# Patient Record
Sex: FEMALE | Race: WHITE | Employment: OTHER | URBAN - METROPOLITAN AREA
[De-identification: names, ages, dates, MRNs, and addresses within clinical notes are randomized per-mention and may not be internally consistent; named-entity substitution may affect disease eponyms.]

---

## 2017-01-18 ENCOUNTER — OFFICE VISIT (OUTPATIENT)
Dept: INTERNAL MEDICINE CLINIC | Facility: CLINIC | Age: 67
End: 2017-01-18

## 2017-01-18 VITALS
HEART RATE: 72 BPM | DIASTOLIC BLOOD PRESSURE: 68 MMHG | BODY MASS INDEX: 28 KG/M2 | RESPIRATION RATE: 16 BRPM | WEIGHT: 143.63 LBS | SYSTOLIC BLOOD PRESSURE: 104 MMHG | TEMPERATURE: 98 F

## 2017-01-18 DIAGNOSIS — S83.91XA SPRAIN OF RIGHT KNEE, UNSPECIFIED LIGAMENT, INITIAL ENCOUNTER: Primary | ICD-10-CM

## 2017-01-18 PROCEDURE — 99213 OFFICE O/P EST LOW 20 MIN: CPT | Performed by: FAMILY MEDICINE

## 2017-01-19 NOTE — PATIENT INSTRUCTIONS
Knee Sprain    A sprain is an injury to the ligaments or capsule that holds a joint together. There are no broken bones. Most sprains take 3 to 6 weeks to heal. If it a severe sprain where the ligament is completely torn, it can take months to recover. · You may use over-the-counter pain medicine to control pain, unless another pain medicine was prescribed. If you have chronic liver or kidney disease or ever had a stomach ulcer or GI bleeding, talk with your healthcare provider before using these medicine

## 2017-01-19 NOTE — PROGRESS NOTES
HPI:    Patient ID: Tamara Woodward is a 73year old female. HPI Here with one day right knee pain. Patient reports no specific injury just started. No pain if not walking. If walking, pain is lateral knee. No numbness/tingling/weakness.  Feels that it gives ou

## 2017-05-24 ENCOUNTER — TELEPHONE (OUTPATIENT)
Dept: INTERNAL MEDICINE CLINIC | Facility: CLINIC | Age: 67
End: 2017-05-24

## 2017-05-24 DIAGNOSIS — Z01.818 PREOPERATIVE EXAMINATION: ICD-10-CM

## 2017-05-24 DIAGNOSIS — M20.41 ACQUIRED HAMMER TOE OF RIGHT FOOT: Primary | ICD-10-CM

## 2017-05-24 DIAGNOSIS — M24.574 CONTRACTURE OF JOINT OF RIGHT FOOT: ICD-10-CM

## 2017-05-24 NOTE — TELEPHONE ENCOUNTER
Please call patient. She needs to have a CMP and EKG done and schedule an appt for pre-op with me a few days after she has them done. This needs to be done soon. Surgery is scheduled for 6/16/17.  She has [facility] listed for her lab so I placed the order for t

## 2017-05-27 ENCOUNTER — HOSPITAL ENCOUNTER (OUTPATIENT)
Dept: CV DIAGNOSTICS | Facility: HOSPITAL | Age: 67
Discharge: HOME | End: 2017-05-27
Attending: FAMILY MEDICINE
Payer: COMMERCIAL

## 2017-05-27 DIAGNOSIS — M20.41 ACQUIRED HAMMER TOE OF RIGHT FOOT: ICD-10-CM

## 2017-05-27 DIAGNOSIS — M24.574 CONTRACTURE OF JOINT OF RIGHT FOOT: ICD-10-CM

## 2017-05-27 DIAGNOSIS — Z01.818 PREOPERATIVE EXAMINATION: ICD-10-CM

## 2017-05-27 PROCEDURE — 93010 ELECTROCARDIOGRAM REPORT: CPT | Performed by: INTERNAL MEDICINE

## 2017-05-27 PROCEDURE — 93005 ELECTROCARDIOGRAM TRACING: CPT

## 2017-06-06 ENCOUNTER — TELEPHONE (OUTPATIENT)
Dept: INTERNAL MEDICINE CLINIC | Facility: CLINIC | Age: 67
End: 2017-06-06

## 2017-06-06 ENCOUNTER — OFFICE VISIT (OUTPATIENT)
Dept: INTERNAL MEDICINE CLINIC | Facility: CLINIC | Age: 67
End: 2017-06-06

## 2017-06-06 VITALS
HEART RATE: 65 BPM | TEMPERATURE: 98 F | HEIGHT: 60 IN | DIASTOLIC BLOOD PRESSURE: 76 MMHG | WEIGHT: 139 LBS | OXYGEN SATURATION: 97 % | RESPIRATION RATE: 17 BRPM | BODY MASS INDEX: 27.29 KG/M2 | SYSTOLIC BLOOD PRESSURE: 122 MMHG

## 2017-06-06 DIAGNOSIS — Z01.818 PREOPERATIVE EXAMINATION: Primary | ICD-10-CM

## 2017-06-06 DIAGNOSIS — M20.41 ACQUIRED HAMMER TOE OF RIGHT FOOT: ICD-10-CM

## 2017-06-06 DIAGNOSIS — M24.574 CONTRACTURE OF JOINT OF RIGHT FOOT: ICD-10-CM

## 2017-06-06 PROCEDURE — 99213 OFFICE O/P EST LOW 20 MIN: CPT | Performed by: FAMILY MEDICINE

## 2017-06-08 ENCOUNTER — TELEPHONE (OUTPATIENT)
Dept: INTERNAL MEDICINE CLINIC | Facility: CLINIC | Age: 67
End: 2017-06-08

## 2017-06-08 NOTE — TELEPHONE ENCOUNTER
vmml for pt, advising AMS out of office today. Advised usually sore throats are viral, can try comfort measures ie salt water gargles, warm liquids to soothe throat, and otc ie tylenol or whatever she takes for a headache.   Advised we will call her back i

## 2017-06-12 ENCOUNTER — TELEPHONE (OUTPATIENT)
Dept: INTERNAL MEDICINE CLINIC | Facility: CLINIC | Age: 67
End: 2017-06-12

## 2017-06-12 ENCOUNTER — PATIENT MESSAGE (OUTPATIENT)
Dept: INTERNAL MEDICINE CLINIC | Facility: CLINIC | Age: 67
End: 2017-06-12

## 2017-06-13 NOTE — TELEPHONE ENCOUNTER
Patient states the DMV is closed on Mondays, was going to go back to work today but already called in to work for a sick day and changed her clothes. Pt would like the note to say she can return to work tomorrow 6/14/17. Please advise.

## 2017-06-13 NOTE — TELEPHONE ENCOUNTER
Please call or message patient. I sent her note with days off that she listed in her note to us. I sent to her MyChart. Please have patient review to make sure it is what she was looking for.

## 2017-06-13 NOTE — TELEPHONE ENCOUNTER
ADRY for patient at [phone] ESTUARDO will be writing a note for her. Asked pt to call back to let us know where the note needs to be faxed.

## 2017-06-13 NOTE — TELEPHONE ENCOUNTER
From: Olamide Irwin  To:  Kathy Houston DO  Sent: 6/12/2017 6:27 PM CDT  Subject: Other    Today I requested a Return To Work note so I can   return to work tomorrow (Tuesday)  I was home on Thursday, Friday and Saturday with flue-like or Sinus   Infection-li

## 2018-01-17 ENCOUNTER — TELEPHONE (OUTPATIENT)
Dept: INTERNAL MEDICINE CLINIC | Facility: CLINIC | Age: 68
End: 2018-01-17

## 2018-01-17 NOTE — TELEPHONE ENCOUNTER
Called and left detailed message on pt's cell phone. I reminded pt to call the office and schedule her annual px. Pt's last physical- 10/03/16   No future appointments.

## 2018-01-30 ENCOUNTER — TELEPHONE (OUTPATIENT)
Dept: INTERNAL MEDICINE CLINIC | Facility: CLINIC | Age: 68
End: 2018-01-30

## 2018-01-30 DIAGNOSIS — Z13.1 SCREENING FOR DIABETES MELLITUS: ICD-10-CM

## 2018-01-30 DIAGNOSIS — Z13.220 SCREENING, LIPID: Primary | ICD-10-CM

## 2018-01-30 DIAGNOSIS — Z13.0 SCREENING FOR DEFICIENCY ANEMIA: ICD-10-CM

## 2018-01-30 DIAGNOSIS — Z13.29 SCREENING FOR THYROID DISORDER: ICD-10-CM

## 2018-01-30 NOTE — TELEPHONE ENCOUNTER
Pt states last week she had stomach flu now yesterday and today after she eats she has liquid diarrhea and her stomach grumbles-is this a flu bug and what can she do for it?  Call to advise

## 2018-01-31 NOTE — TELEPHONE ENCOUNTER
Patient states she did have an episode yesterday of liquid diarrhea x 6 and today is a little better.   Pt denies fever, pain, n/v.  Pt notified to try the BRAT diet, push fluids, no spicy foods, no dairy, no raw fruits or vegetables and call if s/s do not

## 2018-02-01 NOTE — TELEPHONE ENCOUNTER
Pt called back today to say she is a little better and wants to know when she should start eating sold foods?  Call to advise

## 2018-02-01 NOTE — TELEPHONE ENCOUNTER
Pt stating is feeling better. Still loose stools. Slight intermittent abdominal discomfort. No bloating or swelling. Has been following bland/BRAT diet x2 days. No fever at this time. No N/V.  Advised may try diet regimen for another couple days if would li

## 2018-02-10 ENCOUNTER — APPOINTMENT (OUTPATIENT)
Dept: LAB | Facility: HOSPITAL | Age: 68
End: 2018-02-10
Attending: FAMILY MEDICINE
Payer: COMMERCIAL

## 2018-02-10 LAB
ALBUMIN SERPL-MCNC: 3.5 G/DL (ref 3.5–4.8)
ALP LIVER SERPL-CCNC: 71 U/L (ref 55–142)
ALT SERPL-CCNC: 7 U/L (ref 14–54)
AST SERPL-CCNC: 18 U/L (ref 15–41)
BASOPHILS # BLD AUTO: 0.07 X10(3) UL (ref 0–0.1)
BASOPHILS NFR BLD AUTO: 1.2 %
BILIRUB SERPL-MCNC: 0.7 MG/DL (ref 0.1–2)
BUN BLD-MCNC: 16 MG/DL (ref 8–20)
CALCIUM BLD-MCNC: 8.9 MG/DL (ref 8.3–10.3)
CHLORIDE: 109 MMOL/L (ref 101–111)
CHOLEST SMN-MCNC: 141 MG/DL (ref ?–200)
CO2: 29 MMOL/L (ref 22–32)
CREAT BLD-MCNC: 0.71 MG/DL (ref 0.55–1.02)
EOSINOPHIL # BLD AUTO: 0.23 X10(3) UL (ref 0–0.3)
EOSINOPHIL NFR BLD AUTO: 4 %
ERYTHROCYTE [DISTWIDTH] IN BLOOD BY AUTOMATED COUNT: 12.5 % (ref 11.5–16)
GLUCOSE BLD-MCNC: 84 MG/DL (ref 70–99)
HCT VFR BLD AUTO: 37.2 % (ref 34–50)
HDLC SERPL-MCNC: 47 MG/DL (ref 45–?)
HDLC SERPL: 3 {RATIO} (ref ?–4.44)
HGB BLD-MCNC: 12.1 G/DL (ref 12–16)
IMMATURE GRANULOCYTE COUNT: 0.01 X10(3) UL (ref 0–1)
IMMATURE GRANULOCYTE RATIO %: 0.2 %
LDLC SERPL CALC-MCNC: 85 MG/DL (ref ?–130)
LYMPHOCYTES # BLD AUTO: 1.77 X10(3) UL (ref 0.9–4)
LYMPHOCYTES NFR BLD AUTO: 30.7 %
M PROTEIN MFR SERPL ELPH: 6.5 G/DL (ref 6.1–8.3)
MCH RBC QN AUTO: 29.4 PG (ref 27–33.2)
MCHC RBC AUTO-ENTMCNC: 32.5 G/DL (ref 31–37)
MCV RBC AUTO: 90.3 FL (ref 81–100)
MONOCYTES # BLD AUTO: 0.48 X10(3) UL (ref 0.1–1)
MONOCYTES NFR BLD AUTO: 8.3 %
NEUTROPHIL ABS PRELIM: 3.21 X10 (3) UL (ref 1.3–6.7)
NEUTROPHILS # BLD AUTO: 3.21 X10(3) UL (ref 1.3–6.7)
NEUTROPHILS NFR BLD AUTO: 55.6 %
NONHDLC SERPL-MCNC: 94 MG/DL (ref ?–130)
PLATELET # BLD AUTO: 177 10(3)UL (ref 150–450)
POTASSIUM SERPL-SCNC: 4 MMOL/L (ref 3.6–5.1)
RBC # BLD AUTO: 4.12 X10(6)UL (ref 3.8–5.1)
RED CELL DISTRIBUTION WIDTH-SD: 41.1 FL (ref 35.1–46.3)
SODIUM SERPL-SCNC: 143 MMOL/L (ref 136–144)
TRIGL SERPL-MCNC: 46 MG/DL (ref ?–150)
TSI SER-ACNC: 1.14 MIU/ML (ref 0.35–5.5)
VLDLC SERPL CALC-MCNC: 9 MG/DL (ref 5–40)
WBC # BLD AUTO: 5.8 X10(3) UL (ref 4–13)

## 2018-02-10 PROCEDURE — 85025 COMPLETE CBC W/AUTO DIFF WBC: CPT | Performed by: FAMILY MEDICINE

## 2018-02-10 PROCEDURE — 80053 COMPREHEN METABOLIC PANEL: CPT | Performed by: FAMILY MEDICINE

## 2018-02-10 PROCEDURE — 84443 ASSAY THYROID STIM HORMONE: CPT | Performed by: FAMILY MEDICINE

## 2018-02-10 PROCEDURE — 36415 COLL VENOUS BLD VENIPUNCTURE: CPT | Performed by: FAMILY MEDICINE

## 2018-02-10 PROCEDURE — 80061 LIPID PANEL: CPT | Performed by: FAMILY MEDICINE

## 2018-02-19 ENCOUNTER — OFFICE VISIT (OUTPATIENT)
Dept: INTERNAL MEDICINE CLINIC | Facility: CLINIC | Age: 68
End: 2018-02-19

## 2018-02-19 VITALS
TEMPERATURE: 98 F | HEIGHT: 61 IN | HEART RATE: 62 BPM | RESPIRATION RATE: 16 BRPM | SYSTOLIC BLOOD PRESSURE: 118 MMHG | WEIGHT: 138 LBS | DIASTOLIC BLOOD PRESSURE: 62 MMHG | BODY MASS INDEX: 26.06 KG/M2

## 2018-02-19 DIAGNOSIS — Z78.0 POSTMENOPAUSAL: ICD-10-CM

## 2018-02-19 DIAGNOSIS — Z00.00 ANNUAL PHYSICAL EXAM: Primary | ICD-10-CM

## 2018-02-19 PROCEDURE — 90471 IMMUNIZATION ADMIN: CPT | Performed by: FAMILY MEDICINE

## 2018-02-19 PROCEDURE — 90732 PPSV23 VACC 2 YRS+ SUBQ/IM: CPT | Performed by: FAMILY MEDICINE

## 2018-02-19 PROCEDURE — 90653 IIV ADJUVANT VACCINE IM: CPT | Performed by: FAMILY MEDICINE

## 2018-02-19 PROCEDURE — 90472 IMMUNIZATION ADMIN EACH ADD: CPT | Performed by: FAMILY MEDICINE

## 2018-02-19 PROCEDURE — 99397 PER PM REEVAL EST PAT 65+ YR: CPT | Performed by: FAMILY MEDICINE

## 2018-02-19 NOTE — PROGRESS NOTES
HPI:    Patient ID: Sakina Dove is a 80year old female. HPI Here for annual check-up. Patient has no complaints. Tries to eat healthy but doesn't exercise regularly. Up to date on mammogram and pap- has them done through [facility]. Due for DEXA.      Past Med polydipsia, polyphagia and polyuria. Genitourinary: Negative for dysuria and frequency. Musculoskeletal: Negative for arthralgias and joint swelling. Skin: Negative for rash. Neurological: Negative for dizziness, weakness, numbness and headaches. (CPT=77080)       [id number]

## 2018-02-26 ENCOUNTER — HOSPITAL ENCOUNTER (OUTPATIENT)
Dept: BONE DENSITY | Age: 68
Discharge: HOME | End: 2018-02-26
Attending: FAMILY MEDICINE
Payer: COMMERCIAL

## 2018-02-26 DIAGNOSIS — Z78.0 POSTMENOPAUSAL: ICD-10-CM

## 2018-02-26 PROCEDURE — 77080 DXA BONE DENSITY AXIAL: CPT | Performed by: FAMILY MEDICINE

## 2018-02-28 ENCOUNTER — TELEPHONE (OUTPATIENT)
Dept: INTERNAL MEDICINE CLINIC | Facility: CLINIC | Age: 68
End: 2018-02-28

## 2018-02-28 NOTE — TELEPHONE ENCOUNTER
Patient is calling for test results on her bone density.   Please call her after 5  Patient cannot take any calls until after 5

## 2018-03-09 PROBLEM — M17.11 PRIMARY LOCALIZED OSTEOARTHROSIS OF RIGHT LOWER LEG: Status: ACTIVE | Noted: 2018-03-09

## 2018-03-09 PROBLEM — M25.561 RIGHT KNEE PAIN, UNSPECIFIED CHRONICITY: Status: ACTIVE | Noted: 2018-03-09

## 2018-03-10 ENCOUNTER — HOSPITAL ENCOUNTER (EMERGENCY)
Facility: HOSPITAL | Age: 68
Discharge: HOME | End: 2018-03-10
Attending: EMERGENCY MEDICINE
Payer: COMMERCIAL

## 2018-03-10 VITALS
DIASTOLIC BLOOD PRESSURE: 91 MMHG | OXYGEN SATURATION: 97 % | WEIGHT: 135 LBS | HEIGHT: 60 IN | BODY MASS INDEX: 26.5 KG/M2 | HEART RATE: 75 BPM | TEMPERATURE: 98 F | SYSTOLIC BLOOD PRESSURE: 190 MMHG | RESPIRATION RATE: 16 BRPM

## 2018-03-10 DIAGNOSIS — S39.012A STRAIN OF LUMBAR REGION, INITIAL ENCOUNTER: Primary | ICD-10-CM

## 2018-03-10 PROCEDURE — 99283 EMERGENCY DEPT VISIT LOW MDM: CPT

## 2018-03-10 RX ORDER — CYCLOBENZAPRINE HCL 10 MG
10 TABLET ORAL 3 TIMES DAILY PRN
Status: DISCONTINUED | OUTPATIENT
Start: 2018-03-10 | End: 2018-03-10

## 2018-03-10 RX ORDER — ONDANSETRON 4 MG/1
4 TABLET, ORALLY DISINTEGRATING ORAL EVERY 4 HOURS PRN
Qty: 10 TABLET | Refills: 0 | Status: SHIPPED | OUTPATIENT
Start: 2018-03-10 | End: 2018-03-13

## 2018-03-10 RX ORDER — CYCLOBENZAPRINE HCL 10 MG
10 TABLET ORAL 3 TIMES DAILY PRN
Qty: 20 TABLET | Refills: 0 | Status: SHIPPED | OUTPATIENT
Start: 2018-03-10 | End: 2018-03-17

## 2018-03-11 NOTE — ED PROVIDER NOTES
Patient Seen in: [facility] Emergency Department    History   Patient presents with:  Back Pain (musculoskeletal)    Stated Complaint: Back pain after moving furniture [address]  Denies N/T or incontinence    HPI    72-year-old female presents to the gwen Physical Exam    General appearance: This is a female lying in a gurney. Vital signs were reviewed per nurse's notes. HEENT: Normocephalic atraumatic. Anicteric sclera.   Oral mucosa is moist.  Oropharynx is normal.  Neck: No adenopathy or thyrom

## 2018-03-13 ENCOUNTER — OFFICE VISIT (OUTPATIENT)
Dept: INTERNAL MEDICINE CLINIC | Facility: CLINIC | Age: 68
End: 2018-03-13

## 2018-03-13 VITALS
DIASTOLIC BLOOD PRESSURE: 88 MMHG | HEART RATE: 70 BPM | TEMPERATURE: 98 F | RESPIRATION RATE: 16 BRPM | SYSTOLIC BLOOD PRESSURE: 136 MMHG | BODY MASS INDEX: 27 KG/M2 | WEIGHT: 136 LBS

## 2018-03-13 DIAGNOSIS — M54.42 ACUTE LEFT-SIDED LOW BACK PAIN WITH LEFT-SIDED SCIATICA: Primary | ICD-10-CM

## 2018-03-13 PROCEDURE — 99213 OFFICE O/P EST LOW 20 MIN: CPT | Performed by: FAMILY MEDICINE

## 2018-03-13 RX ORDER — PREDNISONE 20 MG/1
TABLET ORAL
Qty: 14 TABLET | Refills: 0 | Status: SHIPPED | OUTPATIENT
Start: 2018-03-13 | End: 2019-07-29 | Stop reason: ALTCHOICE

## 2018-03-14 NOTE — PROGRESS NOTES
HPI:    Patient ID: Sohail Robertson is a 80year old female. HPI Here for f/u from ER on 3/10 for low back pain. Patient had moved furniture on 3/8 and began to feel pain the next day. Pain is in left low back and left buttock and radiates up and down.  No numb Reviewed ER notes. Continue muscle relaxer. Start oral steroid course. Discussed risks/benefits/potential side effects and proper use of medication. Try heating pad and stretches. Return in 2 weeks if still with similar pain or sooner if worsens.      No or

## 2019-07-29 ENCOUNTER — OFFICE VISIT (OUTPATIENT)
Dept: INTERNAL MEDICINE CLINIC | Facility: CLINIC | Age: 69
End: 2019-07-29
Payer: MEDICARE

## 2019-07-29 VITALS
HEART RATE: 68 BPM | DIASTOLIC BLOOD PRESSURE: 86 MMHG | SYSTOLIC BLOOD PRESSURE: 120 MMHG | OXYGEN SATURATION: 98 % | TEMPERATURE: 99 F | WEIGHT: 140.19 LBS | HEIGHT: 59.96 IN | RESPIRATION RATE: 17 BRPM | BODY MASS INDEX: 27.52 KG/M2

## 2019-07-29 DIAGNOSIS — Z13.1 SCREENING FOR DIABETES MELLITUS: ICD-10-CM

## 2019-07-29 DIAGNOSIS — Z13.220 SCREENING, LIPID: ICD-10-CM

## 2019-07-29 DIAGNOSIS — Z13.0 SCREENING FOR DEFICIENCY ANEMIA: ICD-10-CM

## 2019-07-29 DIAGNOSIS — Z00.00 ENCOUNTER FOR ANNUAL HEALTH EXAMINATION: Primary | ICD-10-CM

## 2019-07-29 DIAGNOSIS — Z11.59 NEED FOR HEPATITIS C SCREENING TEST: ICD-10-CM

## 2019-07-29 DIAGNOSIS — Z01.84 IMMUNITY STATUS TESTING: ICD-10-CM

## 2019-07-29 PROCEDURE — G0402 INITIAL PREVENTIVE EXAM: HCPCS | Performed by: FAMILY MEDICINE

## 2019-07-29 PROCEDURE — 90714 TD VACC NO PRESV 7 YRS+ IM: CPT | Performed by: FAMILY MEDICINE

## 2019-07-29 PROCEDURE — 90471 IMMUNIZATION ADMIN: CPT | Performed by: FAMILY MEDICINE

## 2019-07-29 NOTE — PATIENT INSTRUCTIONS
Yelena Taylor SCREENING SCHEDULE   Tests on this list are recommended by your physician but may not be covered, or covered at this frequency, by your insurer. Please check with your insurance carrier before scheduling to verify coverage.    PREVENTATIVE SERV this or any previous visit.  Limited to patients who meet one of the following criteria:   • Men who are 73-68 years old and have smoked more than 100 cigarettes in their lifetime   • Anyone with a family history    Colorectal Cancer Screening  Covered up t regularly   Immunizations      Influenza  Covered Annually Orders placed or performed in visit on 10/03/16   • FLU VACC [address] ANTIG   Orders placed or performed in visit on 10/21/13   • INFLUENZA VIRUS VACCINE, >=1YEARS OF AGE    Please get every their home computer and printer. (the forms are also available in [address])  [email or web address]  This link also has information from the [facility] regarding Advance Directives.

## 2019-07-29 NOTE — PROGRESS NOTES
MAMMOGRAM DIGITAL BILATERAL DIAGNOSTIC (MAMDDXBL)4/8/2019  [facility]  Component Name Value Ref Range   GDT INDICATION: 71-year-old female presents for follow-up evaluation of   probably benign calcifications within the right breast. are redemonstrated without suspicious  interval change   dating back to  ** Text truncated; see Epic**       HPI:   Donna Perdomo is a 77year old female who presents for a Medicare Initial Annual Wellness visit (Once after 12 month Medicare anniversary) .     [facility], and patient is instructed to get our office a copy of it for scanning into Epic. She has never smoked tobacco.    CAGE Alcohol screening   Lynda Guerrero was screened for Alcohol abuse and had a score of 0 so is at low risk.     Patient Care Team: MEGAN pain or ST  LUNGS: denies shortness of breath with exertion  CARDIOVASCULAR: denies chest pain on exertion  GI: denies abdominal pain, denies heartburn  : denies dysuria, vaginal discharge or itching, no complaint of urinary incontinence   MUSCULOSKELETA texture, turgor normal, no rashes or lesions   Lymph nodes: Cervical, supraclavicular, and axillary nodes normal   Neurologic: Normal       Vaccination History     Immunization History   Administered Date(s) Administered   • FLU VACC High Dose 65 YRS & Old Colonoscopy Screen every 10 years Colonoscopy due on 06/29/2025 Update Health Maintenance if applicable    Flex Sigmoidoscopy Screen every 10 years No results found for this or any previous visit. No flowsheet data found.      Fecal Occult Blood Annuall Zoster  Not covered by Medicare Part B No vaccine history found This may be covered with your pharmacy  prescription benefits                    Template: [address] [[id number]]

## 2019-07-30 ENCOUNTER — LAB ENCOUNTER (OUTPATIENT)
Dept: LAB | Age: 69
End: 2019-07-30
Attending: FAMILY MEDICINE
Payer: MEDICARE

## 2019-07-30 DIAGNOSIS — Z13.0 SCREENING FOR DEFICIENCY ANEMIA: ICD-10-CM

## 2019-07-30 DIAGNOSIS — Z13.1 SCREENING FOR DIABETES MELLITUS: ICD-10-CM

## 2019-07-30 DIAGNOSIS — Z11.59 NEED FOR HEPATITIS C SCREENING TEST: ICD-10-CM

## 2019-07-30 DIAGNOSIS — Z00.00 ENCOUNTER FOR ANNUAL HEALTH EXAMINATION: ICD-10-CM

## 2019-07-30 DIAGNOSIS — Z13.220 SCREENING, LIPID: ICD-10-CM

## 2019-07-30 DIAGNOSIS — Z01.84 IMMUNITY STATUS TESTING: ICD-10-CM

## 2019-07-30 LAB
ALBUMIN SERPL-MCNC: 3.8 G/DL (ref 3.4–5)
ALBUMIN/GLOB SERPL: 1.1 {RATIO} (ref 1–2)
ALP LIVER SERPL-CCNC: 81 U/L (ref 55–142)
ALT SERPL-CCNC: 12 U/L (ref 13–56)
ANION GAP SERPL CALC-SCNC: 5 MMOL/L (ref 0–18)
AST SERPL-CCNC: 19 U/L (ref 15–37)
BASOPHILS # BLD AUTO: 0.07 X10(3) UL (ref 0–0.2)
BASOPHILS NFR BLD AUTO: 1.3 %
BILIRUB SERPL-MCNC: 0.7 MG/DL (ref 0.1–2)
BUN BLD-MCNC: 13 MG/DL (ref 7–18)
BUN/CREAT SERPL: 14.9 (ref 10–20)
CALCIUM BLD-MCNC: 8.8 MG/DL (ref 8.5–10.1)
CHLORIDE SERPL-SCNC: 107 MMOL/L (ref 98–112)
CHOLEST SMN-MCNC: 202 MG/DL (ref ?–200)
CO2 SERPL-SCNC: 29 MMOL/L (ref 21–32)
CREAT BLD-MCNC: 0.87 MG/DL (ref 0.55–1.02)
DEPRECATED RDW RBC AUTO: 41.6 FL (ref 35.1–46.3)
EOSINOPHIL # BLD AUTO: 0.26 X10(3) UL (ref 0–0.7)
EOSINOPHIL NFR BLD AUTO: 4.7 %
ERYTHROCYTE [DISTWIDTH] IN BLOOD BY AUTOMATED COUNT: 12.5 % (ref 11–15)
GLOBULIN PLAS-MCNC: 3.4 G/DL (ref 2.8–4.4)
GLUCOSE BLD-MCNC: 86 MG/DL (ref 70–99)
HCT VFR BLD AUTO: 40.1 % (ref 35–48)
HCV AB SERPL QL IA: NONREACTIVE
HDLC SERPL-MCNC: 55 MG/DL (ref 40–59)
HGB BLD-MCNC: 13.1 G/DL (ref 12–16)
IMM GRANULOCYTES # BLD AUTO: 0.02 X10(3) UL (ref 0–1)
IMM GRANULOCYTES NFR BLD: 0.4 %
LDLC SERPL CALC-MCNC: 135 MG/DL (ref ?–100)
LYMPHOCYTES # BLD AUTO: 1.45 X10(3) UL (ref 1–4)
LYMPHOCYTES NFR BLD AUTO: 26.2 %
M PROTEIN MFR SERPL ELPH: 7.2 G/DL (ref 6.4–8.2)
MCH RBC QN AUTO: 29.7 PG (ref 26–34)
MCHC RBC AUTO-ENTMCNC: 32.7 G/DL (ref 31–37)
MCV RBC AUTO: 90.9 FL (ref 80–100)
MONOCYTES # BLD AUTO: 0.4 X10(3) UL (ref 0.1–1)
MONOCYTES NFR BLD AUTO: 7.2 %
NEUTROPHILS # BLD AUTO: 3.33 X10 (3) UL (ref 1.5–7.7)
NEUTROPHILS # BLD AUTO: 3.33 X10(3) UL (ref 1.5–7.7)
NEUTROPHILS NFR BLD AUTO: 60.2 %
NONHDLC SERPL-MCNC: 147 MG/DL (ref ?–130)
OSMOLALITY SERPL CALC.SUM OF ELEC: 291 MOSM/KG (ref 275–295)
PLATELET # BLD AUTO: 196 10(3)UL (ref 150–450)
POTASSIUM SERPL-SCNC: 4.3 MMOL/L (ref 3.5–5.1)
RBC # BLD AUTO: 4.41 X10(6)UL (ref 3.8–5.3)
SODIUM SERPL-SCNC: 141 MMOL/L (ref 136–145)
TRIGL SERPL-MCNC: 58 MG/DL (ref 30–149)
VLDLC SERPL CALC-MCNC: 12 MG/DL (ref 0–30)
VZV IGG SER IA-ACNC: 1327 (ref 165–?)
WBC # BLD AUTO: 5.5 X10(3) UL (ref 4–11)

## 2019-07-30 PROCEDURE — 86803 HEPATITIS C AB TEST: CPT

## 2019-07-30 PROCEDURE — 80061 LIPID PANEL: CPT

## 2019-07-30 PROCEDURE — 80053 COMPREHEN METABOLIC PANEL: CPT

## 2019-07-30 PROCEDURE — 86787 VARICELLA-ZOSTER ANTIBODY: CPT

## 2019-07-30 PROCEDURE — 36415 COLL VENOUS BLD VENIPUNCTURE: CPT

## 2019-07-30 PROCEDURE — 85025 COMPLETE CBC W/AUTO DIFF WBC: CPT

## 2019-08-05 ENCOUNTER — MED REC SCAN ONLY (OUTPATIENT)
Dept: INTERNAL MEDICINE CLINIC | Facility: CLINIC | Age: 69
End: 2019-08-05

## 2019-10-11 ENCOUNTER — TELEPHONE (OUTPATIENT)
Dept: INTERNAL MEDICINE CLINIC | Facility: CLINIC | Age: 69
End: 2019-10-11

## 2019-10-11 NOTE — TELEPHONE ENCOUNTER
Fax received from patient  Fluzone and shingrix shot given at  [place], [address]  [facility] [address]  EMR updated

## 2019-12-30 ENCOUNTER — APPOINTMENT (OUTPATIENT)
Dept: ULTRASOUND IMAGING | Facility: HOSPITAL | Age: 69
End: 2019-12-30
Attending: EMERGENCY MEDICINE
Payer: MEDICARE

## 2019-12-30 ENCOUNTER — TELEPHONE (OUTPATIENT)
Dept: INTERNAL MEDICINE CLINIC | Facility: CLINIC | Age: 69
End: 2019-12-30

## 2019-12-30 ENCOUNTER — TELEPHONE (OUTPATIENT)
Dept: SURGERY | Facility: CLINIC | Age: 69
End: 2019-12-30

## 2019-12-30 ENCOUNTER — HOSPITAL ENCOUNTER (OUTPATIENT)
Age: 69
Discharge: HOME | End: 2019-12-30
Attending: EMERGENCY MEDICINE
Payer: MEDICARE

## 2019-12-30 VITALS
HEIGHT: 60 IN | OXYGEN SATURATION: 97 % | BODY MASS INDEX: 27.09 KG/M2 | DIASTOLIC BLOOD PRESSURE: 87 MMHG | SYSTOLIC BLOOD PRESSURE: 150 MMHG | TEMPERATURE: 98 F | WEIGHT: 138 LBS | RESPIRATION RATE: 18 BRPM | HEART RATE: 70 BPM

## 2019-12-30 DIAGNOSIS — K80.50 BILIARY COLIC: Primary | ICD-10-CM

## 2019-12-30 LAB
ALBUMIN SERPL-MCNC: 3.6 G/DL (ref 3.4–5)
ALP LIVER SERPL-CCNC: 75 U/L (ref 55–142)
ALT SERPL-CCNC: 9 U/L (ref 13–56)
AST SERPL-CCNC: 17 U/L (ref 15–37)
BILIRUB DIRECT SERPL-MCNC: 0.2 MG/DL (ref 0–0.2)
BILIRUB SERPL-MCNC: 0.8 MG/DL (ref 0.1–2)
LIPASE SERPL-CCNC: 72 U/L (ref 73–393)
M PROTEIN MFR SERPL ELPH: 6.9 G/DL (ref 6.4–8.2)

## 2019-12-30 PROCEDURE — 36415 COLL VENOUS BLD VENIPUNCTURE: CPT

## 2019-12-30 PROCEDURE — 80076 HEPATIC FUNCTION PANEL: CPT | Performed by: EMERGENCY MEDICINE

## 2019-12-30 PROCEDURE — 99214 OFFICE O/P EST MOD 30 MIN: CPT

## 2019-12-30 PROCEDURE — 76700 US EXAM ABDOM COMPLETE: CPT | Performed by: EMERGENCY MEDICINE

## 2019-12-30 PROCEDURE — 83690 ASSAY OF LIPASE: CPT | Performed by: EMERGENCY MEDICINE

## 2019-12-30 RX ORDER — DICYCLOMINE HCL 20 MG
20 TABLET ORAL 3 TIMES DAILY
Qty: 20 TABLET | Refills: 0 | Status: SHIPPED | OUTPATIENT
Start: 2019-12-30 | End: 2020-11-11

## 2019-12-30 NOTE — TELEPHONE ENCOUNTER
Spoke with Pt. Regarding appointment with Dr. Macie Nageotte in office for abdominal pain following the ED visit today. Pt. Stated that she was told to f/u with her PCP's office ASAP to conduct a possible HIDA scan. Pt.  Inquired whether or not she should see our of

## 2019-12-30 NOTE — TELEPHONE ENCOUNTER
Spoke with patient stating on Christmas eve after eating dinner she started having severe abdominal pain, nauseas, fevers, chills, no diarrhea, no constipation, on Sarg[place] Day she was sick all day could not get up.   All she has been able to eat since Chr

## 2019-12-30 NOTE — ED PROVIDER NOTES
Patient Seen in: [address] [facility]      History   Patient presents with:  Abdomen/Flank Pain    Stated Complaint: abdominal pain, burping x5 days    HPI    Patient is a 71-year-old female comes the ED for evaluation of abdominal p Current:/87   Pulse 70   Temp 98.3 °F (36.8 °C) (Oral)   Resp 18   Ht 152.4 cm (5')   Wt 62.6 kg   SpO2 97%   BMI 26.95 kg/m²         Physical Exam    GENERAL: No acute distress, well appearing and non-toxic, Alert and oriented X 3   HEENT: Nor Normal caliber bile ducts. CBD is measured at 0.2 cm. PANCREAS:  Uniform echogenicity. SPLEEN:  Uniform echotexture. Bipolar Size 10.1 cm. KIDNEYS:  Normal anatomic positions. No hydronephrosis or shadowing calculus.   Right measures 8.8 and left 9.8 cm felt comfortable going home. Disposition and Plan     Clinical Impression:  Biliary colic  (primary encounter diagnosis)    Disposition:  Discharge  12/30/2019 10:22 am    Follow-up:  Diaz Mcleod MD  [address]

## 2019-12-30 NOTE — TELEPHONE ENCOUNTER
Pt stated that xmas night after eating she was c/o a sudden onset of abd pain and for 30 hours after that she was in a lot of pain to the point that she was unable to eat and drink.  When she tried to eat anything the abd pain would flare up     Call transf

## 2019-12-30 NOTE — ED INITIAL ASSESSMENT (HPI)
Pt arrived alert and responsive. Pt c/o abdominal pain after eating. Pt states 1 week ago she had nausea . Pt denies vomiting and diarrhea today.

## 2019-12-31 ENCOUNTER — OFFICE VISIT (OUTPATIENT)
Dept: INTERNAL MEDICINE CLINIC | Facility: CLINIC | Age: 69
End: 2019-12-31
Payer: MEDICARE

## 2019-12-31 VITALS
BODY MASS INDEX: 26.77 KG/M2 | HEART RATE: 80 BPM | WEIGHT: 136.38 LBS | RESPIRATION RATE: 16 BRPM | TEMPERATURE: 99 F | DIASTOLIC BLOOD PRESSURE: 76 MMHG | HEIGHT: 59.96 IN | SYSTOLIC BLOOD PRESSURE: 128 MMHG

## 2019-12-31 DIAGNOSIS — R10.10 UPPER ABDOMINAL PAIN: Primary | ICD-10-CM

## 2019-12-31 PROCEDURE — 99214 OFFICE O/P EST MOD 30 MIN: CPT | Performed by: FAMILY MEDICINE

## 2020-01-02 NOTE — PROGRESS NOTES
HPI:    Patient ID: Keli Amaro is a 77year old female. HPI  Here for f/u from urgent care on 12/30/19 for upper abd pain. Patient has had intermittent pain for the past week. Notes some nausea with pain in epigastric region. Pain started after eating.  No Referrals:  [facility]  ([id number])       [id number]

## 2020-01-06 ENCOUNTER — TELEPHONE (OUTPATIENT)
Dept: INTERNAL MEDICINE CLINIC | Facility: CLINIC | Age: 70
End: 2020-01-06

## 2020-01-06 NOTE — TELEPHONE ENCOUNTER
Pt called and stated that she has an appt for this Wednesday for the scan that AMS wanted her to do for her abd pain     [facility]AN      Pt states that her symptoms have improved some but have not improved 100% and would like to know if she s

## 2020-01-06 NOTE — TELEPHONE ENCOUNTER
Spoke with patient stating she is feeling better, abdominal pain has subsided, NO nausea, with medications that were prescribed at [facility]. She still burps quite often and stomach girgles after eating. Patient has appt for test on 1/8/2020.  Patient asking if AM

## 2020-01-06 NOTE — TELEPHONE ENCOUNTER
Spoke with patient informed it is up to her but AMS doesn't think she needs it at this point, in the future if pain recurs, we can always do it then. Patient verbalized understanding and agreeable to POC.

## 2020-01-06 NOTE — TELEPHONE ENCOUNTER
It's up to her but I don't think she needs it at this point. In the future if pain recurs, we can always do it then.

## 2020-02-13 ENCOUNTER — HOSPITAL ENCOUNTER (OUTPATIENT)
Age: 70
Discharge: HOME | End: 2020-02-13
Attending: EMERGENCY MEDICINE
Payer: MEDICARE

## 2020-02-13 VITALS
SYSTOLIC BLOOD PRESSURE: 166 MMHG | DIASTOLIC BLOOD PRESSURE: 95 MMHG | TEMPERATURE: 98 F | HEART RATE: 80 BPM | RESPIRATION RATE: 16 BRPM | OXYGEN SATURATION: 98 %

## 2020-02-13 DIAGNOSIS — I88.9 LYMPHADENITIS: Primary | ICD-10-CM

## 2020-02-13 DIAGNOSIS — J02.9 SORE THROAT: ICD-10-CM

## 2020-02-13 PROCEDURE — 99214 OFFICE O/P EST MOD 30 MIN: CPT

## 2020-02-13 PROCEDURE — 99213 OFFICE O/P EST LOW 20 MIN: CPT

## 2020-02-13 RX ORDER — CEPHALEXIN 500 MG/1
500 CAPSULE ORAL 3 TIMES DAILY
Qty: 21 CAPSULE | Refills: 0 | Status: SHIPPED | OUTPATIENT
Start: 2020-02-13 | End: 2020-02-20

## 2020-02-13 NOTE — ED PROVIDER NOTES
Patient Seen in: [facility]      History   Patient presents with:  Sore Throat    Stated Complaint: swollen neck, painful swallowing x1 day    HPI    Patient is a pleasant 80-year-old female presents with pain on swallowing comfortably in no acute distress  HEENT: Normal cephalic atraumatic. Nonicteric sclera. Moist mucous membranes. No meningismus. Does have mild adenopathy in the right cervical region. There is mild erythema the posterior oropharynx.   There is no objec

## 2020-02-13 NOTE — ED INITIAL ASSESSMENT (HPI)
Pt c/o pain to the right side of her neck and swelling with tenderness. Pt states that when she swallows she feels pain on the right side of her throat. Pt states that she is just getting over having a cough.

## 2020-10-30 ENCOUNTER — TELEPHONE (OUTPATIENT)
Dept: INTERNAL MEDICINE CLINIC | Facility: CLINIC | Age: 70
End: 2020-10-30

## 2020-10-30 DIAGNOSIS — Z13.220 SCREENING, LIPID: ICD-10-CM

## 2020-10-30 DIAGNOSIS — Z13.0 SCREENING FOR DEFICIENCY ANEMIA: ICD-10-CM

## 2020-10-30 DIAGNOSIS — Z01.84 IMMUNITY STATUS TESTING: ICD-10-CM

## 2020-10-30 DIAGNOSIS — Z11.59 NEED FOR HEPATITIS C SCREENING TEST: ICD-10-CM

## 2020-10-30 DIAGNOSIS — Z13.1 SCREENING FOR DIABETES MELLITUS: ICD-10-CM

## 2020-10-30 DIAGNOSIS — Z00.00 ENCOUNTER FOR ANNUAL HEALTH EXAMINATION: Primary | ICD-10-CM

## 2020-10-30 NOTE — TELEPHONE ENCOUNTER
Future Appointments   Date Time Provider Shreyas Shrestha   11/11/2020 12:00 PM Huyen Mosley, DO [facility] [facility]     For       Pt would like fasting labs sent to [facility] pls. Pt aware to complete 5-7 days ahead.

## 2020-10-30 NOTE — TELEPHONE ENCOUNTER
Future Appointments   Date Time Provider Shreyas Shrestha   11/4/2020  8:45 AM REFERENCE [id number]   11/11/2020 12:00 PM Oscar Ambrosio DO [facility] [facility]

## 2020-11-04 ENCOUNTER — LAB ENCOUNTER (OUTPATIENT)
Dept: LAB | Age: 70
End: 2020-11-04
Attending: FAMILY MEDICINE
Payer: MEDICARE

## 2020-11-04 DIAGNOSIS — Z13.0 SCREENING FOR DEFICIENCY ANEMIA: ICD-10-CM

## 2020-11-04 DIAGNOSIS — Z13.220 SCREENING, LIPID: ICD-10-CM

## 2020-11-04 DIAGNOSIS — Z13.1 SCREENING FOR DIABETES MELLITUS: ICD-10-CM

## 2020-11-04 DIAGNOSIS — Z00.00 ENCOUNTER FOR ANNUAL HEALTH EXAMINATION: ICD-10-CM

## 2020-11-04 PROCEDURE — 80061 LIPID PANEL: CPT

## 2020-11-04 PROCEDURE — 80053 COMPREHEN METABOLIC PANEL: CPT

## 2020-11-04 PROCEDURE — 36415 COLL VENOUS BLD VENIPUNCTURE: CPT

## 2020-11-04 PROCEDURE — 85025 COMPLETE CBC W/AUTO DIFF WBC: CPT

## 2020-11-11 ENCOUNTER — OFFICE VISIT (OUTPATIENT)
Dept: INTERNAL MEDICINE CLINIC | Facility: CLINIC | Age: 70
End: 2020-11-11
Payer: MEDICARE

## 2020-11-11 VITALS
WEIGHT: 140 LBS | RESPIRATION RATE: 16 BRPM | DIASTOLIC BLOOD PRESSURE: 78 MMHG | HEIGHT: 59.37 IN | SYSTOLIC BLOOD PRESSURE: 124 MMHG | TEMPERATURE: 97 F | HEART RATE: 80 BPM | BODY MASS INDEX: 27.85 KG/M2

## 2020-11-11 DIAGNOSIS — M54.2 NECK PAIN: ICD-10-CM

## 2020-11-11 DIAGNOSIS — Z00.00 ENCOUNTER FOR ANNUAL HEALTH EXAMINATION: Primary | ICD-10-CM

## 2020-11-11 DIAGNOSIS — E78.5 HYPERLIPIDEMIA, UNSPECIFIED HYPERLIPIDEMIA TYPE: ICD-10-CM

## 2020-11-11 PROCEDURE — G0439 PPPS, SUBSEQ VISIT: HCPCS | Performed by: FAMILY MEDICINE

## 2020-11-11 NOTE — PATIENT INSTRUCTIONS
Try taking Pepcid AC prior to eating. Schedule physical therapy. Prevention Guidelines, Women Ages 72 and Older  Screening tests and vaccines are an important part of managing your health.  A screening test is done to find possible disorders or diseases screening. For women 80 and older, screening is not needed. Multiple tests are available and are used at different times.  Possible tests include:  · Flexible sigmoidoscopy every 5 years, or  · Colonoscopy every 10 years, or  · CT colonography (virtual colo exams; talk with your healthcare provider   Thyroid-stimulating hormone (TSH) Only women in this age group with symptoms of thyroid dysfunction Talk with your healthcare provider   Tuberculosis Women at increased risk for infection Talk with your healthcar a series of 2 doses. The 2nd dose is given 2 to 6 months after the first. This is given even if you've had shingles before or had a previous zoster live vaccine. · Zoster live vaccine live (ZVL; Zostavax) may be given to healthy adults over age 61.  It's g but <30)   • Family history of diabetes   • Age 72 years or older   • History of gestational diabetes or birth of baby weighing more than 9 pounds     Covered at least every 3 years,         Glucose (mg/dL)   Date Value   11/04/2020 87   10/14/2013 83 uncomfortable   Glaucoma Screening      Ophthalmology Visit   Covered annually for Diabetics, people with Glaucoma family history,   Americans over age 48   Americans over age 72 No flowsheet data found.  OK to schedule if you are in this ris previous visit.  Medium/high risk factors:   End-stage renal disease   Hemophiliacs who received Factor VIII or IX concentrates   Clients of institutions for the mentally retarded   Persons who live in the same house as a HepB virus carrier   Homosexual men

## 2020-11-11 NOTE — PROGRESS NOTES
CYTOLOGY-GYNECOLOGICAL8/10/2020  [facility]  Component Name Value Ref Range   CPRpt   Patient Name: Esme Flores  Rec #: [id number]  Accession #:  [id number]  Location: [address] [place] [address]  Obtained:  4/72/6821  Received:  2/03/9436  Reported:  8/10 the  sensitivity of mammography. MAMMOGRAM FINDINGS:  There are no suspicious masses, microcalcifications, or architectural distortion  to suggest malignancy in either breast. Benign-appearing calcifications are  evident.  The parenchymal distribution ap like this for months. No clear injury. No numbness/tingling/weakness in upper ext. Hyperlipidemia, unspecified hyperlipidemia type- trying to eat healthy and walks for exercise.      Her last annual assessment has been over 1 year: Annual Physical due o CA 8.9 11/04/2020    ALB 3.5 11/04/2020    TSH 1.140 02/10/2018    CREATSERUM 0.78 11/04/2020    GLU 87 11/04/2020        CBC  (most recent labs)   Lab Results   Component Value Date    WBC 4.8 11/04/2020    HGB 12.8 11/04/2020    .0 11/04/2020 completed. No concerns.          Visual Acuity                           General Appearance:  Alert, cooperative, no distress, appears stated age   Head:  Normocephalic, without obvious abnormality, atraumatic   Eyes:  PERRL, conjunctiva/corneas clear, EOM' agrees to the plan. Reinforced healthy diet, lifestyle, and exercise. Diagnoses and all orders for this visit:    Encounter for annual health examination- Reviewed age-appropriate preventive health and safety recommendations with patient.  Reviewed lab re Flex Sigmoidoscopy Screen every 10 years No results found for this or any previous visit. No flowsheet data found. Fecal Occult Blood Annually No results found for: FOB No flowsheet data found.     Glaucoma Screening      Ophthalmology Visit Annually benefits                        Template: NAYA ROMERO MEDICARE ANNUAL ASSESSMENT FEMALE [[id number]]

## 2020-11-17 ENCOUNTER — OFFICE VISIT (OUTPATIENT)
Dept: PHYSICAL THERAPY | Age: 70
End: 2020-11-17
Attending: FAMILY MEDICINE
Payer: MEDICARE

## 2020-11-17 DIAGNOSIS — M54.2 NECK PAIN: ICD-10-CM

## 2020-11-17 PROCEDURE — 97140 MANUAL THERAPY 1/> REGIONS: CPT

## 2020-11-17 PROCEDURE — 97161 PT EVAL LOW COMPLEX 20 MIN: CPT

## 2020-11-17 PROCEDURE — 97110 THERAPEUTIC EXERCISES: CPT

## 2020-11-17 NOTE — PROGRESS NOTES
SPINE EVALUATION:   Referring Physician: Dr. Clementine Montalvo  Diagnosis: neck pain    Date of Service: 11/17/2020     PATIENT SUMMARY   Ivelisse Travis is a 72year old female who presents to therapy today with complaints of L sided cervical pain with insidious onset numbness/tingling.   Accessory motion: hypomobility at L C3-4 facet opening and R lower cervical hypomobility  Palpation: TTP in C3-4 on L  OBJECTIVE A/PROM Strength    R L R L   Cervical         Flexion WNL*        Extension 25*        Side bending 16 30 Re-ed, and Modalities prn    Education or treatment limitation: None  Rehab Potential:good    Patient/Family/Caregiver was advised of these findings, precautions, and treatment options and has agreed to actively participate in planning and for this course

## 2020-11-19 ENCOUNTER — OFFICE VISIT (OUTPATIENT)
Dept: PHYSICAL THERAPY | Age: 70
End: 2020-11-19
Attending: FAMILY MEDICINE
Payer: MEDICARE

## 2020-11-19 PROCEDURE — 97110 THERAPEUTIC EXERCISES: CPT

## 2020-11-19 PROCEDURE — 97140 MANUAL THERAPY 1/> REGIONS: CPT

## 2020-11-19 NOTE — PROGRESS NOTES
Diagnosis: Neck pain  Precautions: n/a         Insurance Type (# Auth): [facility] (10)   Treatment time: 40 min             Charges: 1 Man, 2 TherEx    Subjective: Pt reports she felt good after our last visit. Her neck is feeling better.     Objective:  Stiffne

## 2020-11-24 ENCOUNTER — OFFICE VISIT (OUTPATIENT)
Dept: PHYSICAL THERAPY | Age: 70
End: 2020-11-24
Attending: FAMILY MEDICINE
Payer: MEDICARE

## 2020-11-24 PROCEDURE — 97140 MANUAL THERAPY 1/> REGIONS: CPT

## 2020-11-24 PROCEDURE — 97110 THERAPEUTIC EXERCISES: CPT

## 2020-11-24 NOTE — PROGRESS NOTES
Diagnosis: Neck pain  Precautions: n/a         Insurance Type (# Auth): [facility] (10)   Treatment time: 40 min             Charges: 1 Man, 2 TherEx    Subjective: Pt reports she is feeling better. She still has some soreness with head rotation.   Objective: L C

## 2020-12-01 ENCOUNTER — OFFICE VISIT (OUTPATIENT)
Dept: PHYSICAL THERAPY | Age: 70
End: 2020-12-01
Attending: FAMILY MEDICINE
Payer: MEDICARE

## 2020-12-01 PROCEDURE — 97110 THERAPEUTIC EXERCISES: CPT

## 2020-12-01 PROCEDURE — 97140 MANUAL THERAPY 1/> REGIONS: CPT

## 2020-12-01 NOTE — PROGRESS NOTES
Diagnosis: Neck pain  Precautions: n/a         Insurance Type (# Auth): [facility] (10)   Treatment time: 40 min             Charges: 1 Man, 2 TherEx    Subjective:  Pt reports she did more activity during Thanksgiving and then putting decorations up.   She notice flex/ext, x10, 2#   SA punch  X20, 2# *X20, 2# X20, cane, 2#   PNF, R/L  B, x15, 2#, supine B, x15, 2#, supine Sitting, x10, 1#   Prone, horiz abd   *X15, 1# -   Prone, lower trap   *X15, 0# -   Wall flex  Towel, x10 *Towel, x10 SB, x10   UE ext  X20, red

## 2020-12-03 ENCOUNTER — OFFICE VISIT (OUTPATIENT)
Dept: PHYSICAL THERAPY | Age: 70
End: 2020-12-03
Attending: FAMILY MEDICINE
Payer: MEDICARE

## 2020-12-03 PROCEDURE — 97110 THERAPEUTIC EXERCISES: CPT

## 2020-12-03 PROCEDURE — 97140 MANUAL THERAPY 1/> REGIONS: CPT

## 2020-12-03 NOTE — PROGRESS NOTES
Diagnosis: Neck pain  Precautions: n/a         Insurance Type (# Auth): [facility] (10)   Treatment time: 40 min             Charges: 1 Man, 2 TherEx    Subjective:  Pt reports she felt okay after our last visit. Her mobility if feeling better.   Objective:   Cer cervical, 2 min   PROM, cervical sidebend R, 5 min  *doorway, levator scap sidebend R, 5 min sidebend R, 5 min Side bend and  Side bend with flex, 3 min Side bend and  Side bend with flex, 3 min  SB up wall, x10   Chin tuck *x20 - - - -   Wall push up *x20

## 2020-12-08 ENCOUNTER — OFFICE VISIT (OUTPATIENT)
Dept: PHYSICAL THERAPY | Age: 70
End: 2020-12-08
Attending: FAMILY MEDICINE
Payer: MEDICARE

## 2020-12-08 PROCEDURE — 97110 THERAPEUTIC EXERCISES: CPT

## 2020-12-08 PROCEDURE — 97140 MANUAL THERAPY 1/> REGIONS: CPT

## 2020-12-08 NOTE — PROGRESS NOTES
Diagnosis: Neck pain  Precautions: n/a         Insurance Type (# Auth): [facility] (10)   Treatment time: 40 min             Charges: 1 Man, 2 TherEx    Subjective:  Pt reports she has been practicing rotating to the R.  Objective:   Cervical AROM:    Rotation R: sidebend R, 5 min Side bend and  Side bend with flex, 3 min Side bend and  Side bend with flex, 3 min  SB up wall, x10 Rot, side bend to R, 5 min   Chin tuck - - - - -   Wall push up x20 x20 x20 SB, x20 -   Supine flex X20, cane X20, cane, 2# Alt flex/ext,

## 2020-12-10 ENCOUNTER — OFFICE VISIT (OUTPATIENT)
Dept: PHYSICAL THERAPY | Age: 70
End: 2020-12-10
Attending: FAMILY MEDICINE
Payer: MEDICARE

## 2020-12-10 PROCEDURE — 97140 MANUAL THERAPY 1/> REGIONS: CPT

## 2020-12-10 PROCEDURE — 97110 THERAPEUTIC EXERCISES: CPT

## 2020-12-10 NOTE — PROGRESS NOTES
Diagnosis: Neck pain  Precautions: n/a         Insurance Type (# Auth): [facility] (10)   Treatment time: 40 min             Charges: 1 Man, 2 TherEx    Subjective:  Pt felt less restriction with R rotation after our last visit.   Objective:   Cervical AROM:    Ro wall, x10 Rot, side bend to R, 5 min Rot, side bend to R, 5 min   SNAG to R     *x10   Chin tuck - - - - -   Wall push up x20 x20 SB, x20 - -   Supine flex X20, cane, 2# Alt flex/ext, x10, 2# Alt flex/ext, x10, 2# Alt flex/ext, x10, 2# Alt flex/ext, x10, 1

## 2020-12-15 ENCOUNTER — OFFICE VISIT (OUTPATIENT)
Dept: PHYSICAL THERAPY | Age: 70
End: 2020-12-15
Attending: FAMILY MEDICINE
Payer: MEDICARE

## 2020-12-15 PROCEDURE — 97110 THERAPEUTIC EXERCISES: CPT

## 2020-12-15 PROCEDURE — 97140 MANUAL THERAPY 1/> REGIONS: CPT

## 2020-12-15 NOTE — PROGRESS NOTES
Diagnosis: Neck pain  Precautions: n/a         Insurance Type (# Auth): [facility] (10)   Treatment time: 40 min             Charges: 1 Man, 2 TherEx    Subjective:  Pt reports she notices she is able to turn her head further when driving.   She notices soreness i rotation, 10 min  • Traction- cervical, 3 min  • R rot, Gr III-III+, 5 min C2-3   PROM, cervical Side bend and  Side bend with flex, 3 min Side bend and  Side bend with flex, 3 min  SB up wall, x10 Rot, side bend to R, 5 min Rot, side bend to R, 5 min Rot,

## 2020-12-17 ENCOUNTER — OFFICE VISIT (OUTPATIENT)
Dept: PHYSICAL THERAPY | Age: 70
End: 2020-12-17
Attending: FAMILY MEDICINE
Payer: MEDICARE

## 2020-12-17 PROCEDURE — 97140 MANUAL THERAPY 1/> REGIONS: CPT

## 2020-12-17 PROCEDURE — 97110 THERAPEUTIC EXERCISES: CPT

## 2020-12-17 NOTE — PROGRESS NOTES
Diagnosis: Neck pain  Precautions: n/a         Insurance Type (# Auth): [facility] (10)   Treatment time: 40 min             Charges: 1 Man, 2 TherEx    Subjective:   Pt notices the stiffness/soreness in the L upper neck.   Objective:   Cervical AROM:  Before PT: bend with flex, 3 min  SB up wall, x10 Rot, side bend to R, 5 min Rot, side bend to R, 5 min Rot, side bend to R, 3 min Rot, side bend to R, 3 min   SNAG to R   *x10 *Ext, x5 -   Chin tuck - - - - -   Wall push up SB, x20 - - - SB, x20   Supine flex Alt fl

## 2020-12-22 ENCOUNTER — OFFICE VISIT (OUTPATIENT)
Dept: PHYSICAL THERAPY | Age: 70
End: 2020-12-22
Attending: FAMILY MEDICINE
Payer: MEDICARE

## 2020-12-22 PROCEDURE — 97140 MANUAL THERAPY 1/> REGIONS: CPT

## 2020-12-22 PROCEDURE — 97110 THERAPEUTIC EXERCISES: CPT

## 2020-12-22 NOTE — PROGRESS NOTES
Diagnosis: Neck pain  Precautions: n/a         Insurance Type (# Auth): [facility] (10)   Treatment time: 40 min             Charges: 1 Man, 2 TherEx   Progress Summary  Pt has attended 10 visits in Physical Therapy.      Subjective:  Pt states overall, she notice x/week or a total of 1-2 visits (total of 12 visits) over a 90 day period. Treatment will include:  Therapeutic Exercise, Manual Therapy, Neuro Re-ed, and Modalities prn       Patient/Family/Caregiver was advised of these findings, precautions, and treatmen - -   Wall push up - - - SB, x20 SB, x20   Supine flex Alt flex/ext, x10, 2# Alt flex/ext, x10, 1# Alt flex/ext, x10, 1# - -   SA punch X20, no cane, 2# - X20, 3# X20, 3# X20, 3#   PNF, R/L Supine, x10, 1# Supine, x10, 1# Standing woodchop, x15, 15# Supine

## 2020-12-27 ENCOUNTER — HOSPITAL ENCOUNTER (OUTPATIENT)
Age: 70
Discharge: ANOTHER HOSPITAL | End: 2020-12-27
Payer: MEDICARE

## 2020-12-27 ENCOUNTER — HOSPITAL ENCOUNTER (EMERGENCY)
Facility: HOSPITAL | Age: 70
Discharge: HOME | End: 2020-12-27
Attending: EMERGENCY MEDICINE
Payer: MEDICARE

## 2020-12-27 ENCOUNTER — APPOINTMENT (OUTPATIENT)
Dept: GENERAL RADIOLOGY | Facility: HOSPITAL | Age: 70
End: 2020-12-27
Attending: EMERGENCY MEDICINE
Payer: MEDICARE

## 2020-12-27 VITALS
HEART RATE: 66 BPM | DIASTOLIC BLOOD PRESSURE: 91 MMHG | TEMPERATURE: 98 F | SYSTOLIC BLOOD PRESSURE: 180 MMHG | RESPIRATION RATE: 16 BRPM | OXYGEN SATURATION: 98 %

## 2020-12-27 VITALS
WEIGHT: 140 LBS | SYSTOLIC BLOOD PRESSURE: 158 MMHG | OXYGEN SATURATION: 98 % | DIASTOLIC BLOOD PRESSURE: 85 MMHG | HEART RATE: 74 BPM | HEIGHT: 60 IN | TEMPERATURE: 98 F | RESPIRATION RATE: 16 BRPM | BODY MASS INDEX: 27.48 KG/M2

## 2020-12-27 DIAGNOSIS — R14.0 ABDOMINAL BLOATING: ICD-10-CM

## 2020-12-27 DIAGNOSIS — R10.9 ABDOMINAL PAIN OF UNKNOWN ETIOLOGY: ICD-10-CM

## 2020-12-27 DIAGNOSIS — R42 DIZZINESS: ICD-10-CM

## 2020-12-27 DIAGNOSIS — I10 HYPERTENSION, UNSPECIFIED TYPE: Primary | ICD-10-CM

## 2020-12-27 PROCEDURE — 99285 EMERGENCY DEPT VISIT HI MDM: CPT

## 2020-12-27 PROCEDURE — 80053 COMPREHEN METABOLIC PANEL: CPT | Performed by: EMERGENCY MEDICINE

## 2020-12-27 PROCEDURE — 74019 RADEX ABDOMEN 2 VIEWS: CPT | Performed by: EMERGENCY MEDICINE

## 2020-12-27 PROCEDURE — 85025 COMPLETE CBC W/AUTO DIFF WBC: CPT | Performed by: EMERGENCY MEDICINE

## 2020-12-27 PROCEDURE — 99205 OFFICE O/P NEW HI 60 MIN: CPT | Performed by: NURSE PRACTITIONER

## 2020-12-27 PROCEDURE — 36415 COLL VENOUS BLD VENIPUNCTURE: CPT

## 2020-12-27 PROCEDURE — 84484 ASSAY OF TROPONIN QUANT: CPT | Performed by: EMERGENCY MEDICINE

## 2020-12-27 PROCEDURE — 93010 ELECTROCARDIOGRAM REPORT: CPT

## 2020-12-27 PROCEDURE — 83690 ASSAY OF LIPASE: CPT | Performed by: EMERGENCY MEDICINE

## 2020-12-27 PROCEDURE — 93005 ELECTROCARDIOGRAM TRACING: CPT

## 2020-12-27 PROCEDURE — 99284 EMERGENCY DEPT VISIT MOD MDM: CPT

## 2020-12-27 NOTE — ED INITIAL ASSESSMENT (HPI)
Sent from [facility] for Abd bloating, nausea, dizziness and hypertension at home, no history of htn, began this am.

## 2020-12-27 NOTE — ED PROVIDER NOTES
Patient Seen in: [facility]mediate [address] [place] [address] [facility]      History   Patient presents with:  Nausea  Dizziness    Stated Complaint: nausea, dizziness today    81-year-old female presents to immediate care with sudden onset of dizziness and nausea.   Patient st Vitals   BP 12/27/20 1200 (!) 164/98   Pulse 12/27/20 1200 64   Resp 12/27/20 1200 14   Temp 12/27/20 1206 97.9 °F (36.6 °C)   Temp src 12/27/20 1206 Temporal   SpO2 12/27/20 1200 98 %   O2 Device 12/27/20 1200 None (Room air)       Current:BP (!) 180/91 for this patient.

## 2020-12-27 NOTE — ED PROVIDER NOTES
Patient Seen in: [facility] Emergency Department      History   Patient presents with:  Abdominal Pain  Hypertension    Stated Complaint: pt to ED from [address] [place] [address] for abd bloating, dizziness, and HTN     HPI    23-year-old female who presents here to the [facility] noted in HPI. Constitutional and vital signs reviewed. All other systems reviewed and negative except as noted above.     Physical Exam     ED Triage Vitals [12/27/20 1318]   /85   Pulse 76   Resp 19   Temp 97.8 °F (36.6 °C)   Temp src    SpO2 9 Abnormal            Final result                 Please view results for these tests on the individual orders.    RAINBOW DRAW LAVENDER   RAINBOW DRAW LIGHT GREEN   RAINBOW DRAW GOLD   RAINBOW DRAW BLUE     EKG    Rate, intervals and axes as noted on EK obstruction or free air showed no evidence of obstruction or free air. The patient appears well and nontoxic. She will need follow-up as an outpatient with her primary care physician to discuss further options for her blood pressures.   She does not appea

## 2020-12-27 NOTE — ED INITIAL ASSESSMENT (HPI)
Pt c/o dizziness that started this morning. Pt states that the room is spinning. Pt also c/o nausea. Pt also c/o abd bloating intermittent for the past year.

## 2020-12-29 ENCOUNTER — OFFICE VISIT (OUTPATIENT)
Dept: PHYSICAL THERAPY | Age: 70
End: 2020-12-29
Attending: FAMILY MEDICINE
Payer: MEDICARE

## 2020-12-29 ENCOUNTER — OFFICE VISIT (OUTPATIENT)
Dept: INTERNAL MEDICINE CLINIC | Facility: CLINIC | Age: 70
End: 2020-12-29
Payer: MEDICARE

## 2020-12-29 VITALS
DIASTOLIC BLOOD PRESSURE: 78 MMHG | SYSTOLIC BLOOD PRESSURE: 136 MMHG | HEIGHT: 60 IN | BODY MASS INDEX: 27.13 KG/M2 | TEMPERATURE: 99 F | OXYGEN SATURATION: 96 % | HEART RATE: 62 BPM | WEIGHT: 138.19 LBS

## 2020-12-29 DIAGNOSIS — R03.0 ELEVATED BLOOD PRESSURE READING WITHOUT DIAGNOSIS OF HYPERTENSION: Primary | ICD-10-CM

## 2020-12-29 DIAGNOSIS — R42 VERTIGO: ICD-10-CM

## 2020-12-29 DIAGNOSIS — I49.9 IRREGULAR HEART BEAT: ICD-10-CM

## 2020-12-29 PROCEDURE — 97140 MANUAL THERAPY 1/> REGIONS: CPT

## 2020-12-29 PROCEDURE — 99214 OFFICE O/P EST MOD 30 MIN: CPT | Performed by: FAMILY MEDICINE

## 2020-12-29 PROCEDURE — 97110 THERAPEUTIC EXERCISES: CPT

## 2020-12-29 NOTE — PROGRESS NOTES
HPI:    Patient ID: Kolton Zhang is a 72year old female. HPI Here for f/u from ER on 12/27/2020. Patient had suddenly had dizziness and nausea while seated. Tried to lay down but the \"spinning\" got worse when she laid down.  Was found to have elevated blo BP on machine and if getting \"irregular heart beat\" alert, try to get EKG ASAP after the alert. Call if any symptoms. 3. Resolved. Discussed possible causes. Call if recurs. No orders of the defined types were placed in this encounter.       Meds Th

## 2020-12-29 NOTE — PROGRESS NOTES
Diagnosis: Neck pain  Precautions: n/a         Insurance Type (# Auth): [facility] (10)   Treatment time: 40 min             Charges: 1 Man, 2 TherEx    Discharge Summary  Pt has attended 11 visits in Physical Therapy.      Subjective:  Pt reports her pain is mini participate in planning and for this course of care. Thank you for your referral. If you have any questions, please contact me at Dept: [phone].     Sincerely,  Electronically signed by therapist: Delfino Santiago, PT        12/10/2020  Tx#: 7 12/15/20 height),x10, yel -   Post delt - - - - -   [place] and arrow - - - - -   Med ball overhead OH tap: x20, yel  Flex bounce: x10, red  Arch: x5, red OH tap: x20, yel  Flex bounce: x10, red  Arch: x5, red - OH tap: x20, yel  Arch: x5, yel OH tap: x20, yel  Arch: x

## 2020-12-29 NOTE — PATIENT INSTRUCTIONS
Continue checking blood pressures once daily. Contact me if your average overall blood pressure is >140/90. If you start to see the alert regarding possible irregular heart beat, get the EKG done as soon as possible after that.  You can walk-in at [facility]

## 2021-03-13 DIAGNOSIS — Z23 NEED FOR VACCINATION: ICD-10-CM

## 2021-08-10 ENCOUNTER — TELEPHONE (OUTPATIENT)
Dept: INTERNAL MEDICINE CLINIC | Facility: CLINIC | Age: 71
End: 2021-08-10

## 2021-08-10 DIAGNOSIS — Z13.220 SCREENING, LIPID: ICD-10-CM

## 2021-08-10 DIAGNOSIS — E78.5 HYPERLIPIDEMIA, UNSPECIFIED HYPERLIPIDEMIA TYPE: ICD-10-CM

## 2021-08-10 DIAGNOSIS — Z13.0 SCREENING FOR DEFICIENCY ANEMIA: ICD-10-CM

## 2021-08-10 DIAGNOSIS — Z00.00 ENCOUNTER FOR ANNUAL HEALTH EXAMINATION: Primary | ICD-10-CM

## 2021-08-10 DIAGNOSIS — Z13.1 SCREENING FOR DIABETES MELLITUS: ICD-10-CM

## 2021-08-10 NOTE — TELEPHONE ENCOUNTER
Annual Physical   Future Appointments   Date Time Provider Shreyas Shrestha   11/3/2021 10:00 AM Дмитрий Clinton, DO [facility] [address] [facility]       Lab is THE [facility]  Pt aware to fast and to complete labs no sooner than 2 weeks prior to physical   No call back re

## 2021-08-26 ENCOUNTER — TELEPHONE (OUTPATIENT)
Dept: INTERNAL MEDICINE CLINIC | Facility: CLINIC | Age: 71
End: 2021-08-26

## 2021-08-26 NOTE — TELEPHONE ENCOUNTER
Pt reports had a headache yesterday for several hours. No CVA-like sx. Took tylenol, resolved. No other related sx. Her concern is her abdomen. Reports onset yesterday; bloating, nausea, diffuse abdominal discomfort (thinks related to the bloating).  Deandre Cherry

## 2021-08-26 NOTE — TELEPHONE ENCOUNTER
Pt stated she has nausea, is bloated and has a headache. I've scheduled pt for tomorrow on below. High TE for Nausea.   Please advise    Future Appointments   Date Time Provider Shreyas Shrestha   8/27/2021  1:45 PM Chrissy Naik, DO [facility] [address] [facility]

## 2021-08-27 ENCOUNTER — OFFICE VISIT (OUTPATIENT)
Dept: INTERNAL MEDICINE CLINIC | Facility: CLINIC | Age: 71
End: 2021-08-27
Payer: MEDICARE

## 2021-08-27 ENCOUNTER — LAB ENCOUNTER (OUTPATIENT)
Dept: LAB | Age: 71
End: 2021-08-27
Attending: FAMILY MEDICINE
Payer: MEDICARE

## 2021-08-27 VITALS
DIASTOLIC BLOOD PRESSURE: 66 MMHG | WEIGHT: 138.81 LBS | HEIGHT: 60 IN | OXYGEN SATURATION: 98 % | SYSTOLIC BLOOD PRESSURE: 122 MMHG | HEART RATE: 88 BPM | BODY MASS INDEX: 27.25 KG/M2 | TEMPERATURE: 98 F

## 2021-08-27 DIAGNOSIS — R10.84 GENERALIZED ABDOMINAL PAIN: ICD-10-CM

## 2021-08-27 DIAGNOSIS — R10.84 GENERALIZED ABDOMINAL PAIN: Primary | ICD-10-CM

## 2021-08-27 LAB
ALBUMIN SERPL-MCNC: 3.6 G/DL (ref 3.4–5)
ALBUMIN/GLOB SERPL: 1.1 {RATIO} (ref 1–2)
ALP LIVER SERPL-CCNC: 79 U/L
ALT SERPL-CCNC: 10 U/L
ANION GAP SERPL CALC-SCNC: 5 MMOL/L (ref 0–18)
AST SERPL-CCNC: 15 U/L (ref 15–37)
BASOPHILS # BLD AUTO: 0.08 X10(3) UL (ref 0–0.2)
BASOPHILS NFR BLD AUTO: 1.2 %
BILIRUB SERPL-MCNC: 0.8 MG/DL (ref 0.1–2)
BUN BLD-MCNC: 15 MG/DL (ref 7–18)
CALCIUM BLD-MCNC: 9 MG/DL (ref 8.5–10.1)
CHLORIDE SERPL-SCNC: 108 MMOL/L (ref 98–112)
CO2 SERPL-SCNC: 29 MMOL/L (ref 21–32)
CREAT BLD-MCNC: 1.04 MG/DL
EOSINOPHIL # BLD AUTO: 0.28 X10(3) UL (ref 0–0.7)
EOSINOPHIL NFR BLD AUTO: 4.1 %
ERYTHROCYTE [DISTWIDTH] IN BLOOD BY AUTOMATED COUNT: 12.7 %
GLOBULIN PLAS-MCNC: 3.3 G/DL (ref 2.8–4.4)
GLUCOSE BLD-MCNC: 132 MG/DL (ref 70–99)
HCT VFR BLD AUTO: 39.8 %
HGB BLD-MCNC: 13.1 G/DL
IMM GRANULOCYTES # BLD AUTO: 0.01 X10(3) UL (ref 0–1)
IMM GRANULOCYTES NFR BLD: 0.1 %
LIPASE SERPL-CCNC: 378 U/L (ref 73–393)
LYMPHOCYTES # BLD AUTO: 1.91 X10(3) UL (ref 1–4)
LYMPHOCYTES NFR BLD AUTO: 27.8 %
M PROTEIN MFR SERPL ELPH: 6.9 G/DL (ref 6.4–8.2)
MCH RBC QN AUTO: 30 PG (ref 26–34)
MCHC RBC AUTO-ENTMCNC: 32.9 G/DL (ref 31–37)
MCV RBC AUTO: 91.1 FL
MONOCYTES # BLD AUTO: 0.46 X10(3) UL (ref 0.1–1)
MONOCYTES NFR BLD AUTO: 6.7 %
NEUTROPHILS # BLD AUTO: 4.13 X10 (3) UL (ref 1.5–7.7)
NEUTROPHILS # BLD AUTO: 4.13 X10(3) UL (ref 1.5–7.7)
NEUTROPHILS NFR BLD AUTO: 60.1 %
OSMOLALITY SERPL CALC.SUM OF ELEC: 297 MOSM/KG (ref 275–295)
PATIENT FASTING Y/N/NP: NO
PLATELET # BLD AUTO: 201 10(3)UL (ref 150–450)
POTASSIUM SERPL-SCNC: 3.8 MMOL/L (ref 3.5–5.1)
RBC # BLD AUTO: 4.37 X10(6)UL
SODIUM SERPL-SCNC: 142 MMOL/L (ref 136–145)
WBC # BLD AUTO: 6.9 X10(3) UL (ref 4–11)

## 2021-08-27 PROCEDURE — 80053 COMPREHEN METABOLIC PANEL: CPT

## 2021-08-27 PROCEDURE — 85025 COMPLETE CBC W/AUTO DIFF WBC: CPT

## 2021-08-27 PROCEDURE — 83690 ASSAY OF LIPASE: CPT

## 2021-08-27 PROCEDURE — 99214 OFFICE O/P EST MOD 30 MIN: CPT | Performed by: FAMILY MEDICINE

## 2021-08-27 PROCEDURE — 36415 COLL VENOUS BLD VENIPUNCTURE: CPT

## 2021-08-27 NOTE — PROGRESS NOTES
HPI/Subjective:   Patient ID: Queen Jamilah is a 80year old female. HPI 3 days of abd pain, initially felt bloated and nauseated but didn't vomit. Generalized abd pain.  Typically has one BM daily and has been a little looser and less complete and has gone a

## 2021-08-27 NOTE — PATIENT INSTRUCTIONS
Schedule your ultrasound for next week and just cancel if you are feeling better. Stick to bland foods over the weekend.

## 2021-08-30 DIAGNOSIS — R10.84 GENERALIZED ABDOMINAL PAIN: Primary | ICD-10-CM

## 2021-08-31 ENCOUNTER — TELEPHONE (OUTPATIENT)
Dept: INTERNAL MEDICINE CLINIC | Facility: CLINIC | Age: 71
End: 2021-08-31

## 2021-08-31 ENCOUNTER — HOSPITAL ENCOUNTER (OUTPATIENT)
Dept: ULTRASOUND IMAGING | Facility: HOSPITAL | Age: 71
Discharge: HOME | End: 2021-08-31
Attending: FAMILY MEDICINE
Payer: MEDICARE

## 2021-08-31 DIAGNOSIS — R10.84 GENERALIZED ABDOMINAL PAIN: ICD-10-CM

## 2021-08-31 PROCEDURE — 76700 US EXAM ABDOM COMPLETE: CPT | Performed by: FAMILY MEDICINE

## 2021-08-31 NOTE — TELEPHONE ENCOUNTER
Philip Miranda from radiology called ([phone])  with stat results for patient.    No acute findings, results are available in chart:    Impression   CONCLUSION:  No acute process.  Gallbladder polyp.  No biliary dilation.  Hepatic and renal cysts are present

## 2021-08-31 NOTE — TELEPHONE ENCOUNTER
If not already contacted, please call patient to let her know there was nothing acute seen on her US. Her gallbladder polyp is tiny and is not likely causing her pain. Her renal cysts are stable. Liver cysts are not causing her symptoms either likely.

## 2021-09-01 NOTE — TELEPHONE ENCOUNTER
Patient notified results of US Abdomen Complete. Pt still concerned that her Lipase went up so 'significantly'. Tried to explain to her that her Lipase is in normal limits.   Pt concerned about the size of her kidney cysts that they have grown and wants to

## 2021-09-01 NOTE — TELEPHONE ENCOUNTER
If she feels more comfortable with seeing Dr. Case Devlin that is fine. I thought she wanted to see GI as well. Both referrals are fine.

## 2021-09-28 ENCOUNTER — OFFICE VISIT (OUTPATIENT)
Dept: NEPHROLOGY | Facility: CLINIC | Age: 71
End: 2021-09-28
Payer: MEDICARE

## 2021-09-28 VITALS — WEIGHT: 138 LBS | DIASTOLIC BLOOD PRESSURE: 74 MMHG | BODY MASS INDEX: 27 KG/M2 | SYSTOLIC BLOOD PRESSURE: 132 MMHG

## 2021-09-28 DIAGNOSIS — R14.0 BLOATING: ICD-10-CM

## 2021-09-28 DIAGNOSIS — N28.1 RENAL CYST: Primary | ICD-10-CM

## 2021-09-28 PROBLEM — R10.9 ABDOMINAL DISCOMFORT: Status: ACTIVE | Noted: 2021-09-28

## 2021-09-28 PROCEDURE — 99204 OFFICE O/P NEW MOD 45 MIN: CPT | Performed by: INTERNAL MEDICINE

## 2021-09-28 NOTE — PROGRESS NOTES
Nephrology Consult Note    REASON FOR CONSULT: Renal cysts    ASSESSMENT/PLAN:      1) Renal cysts- several small approx 1 cm bilateral renal cysts appear stable since ultrasound 2019 and are clearly benign simple cyst without mass component or complexity; Fingers   • Belching Constant   • Bloating 12/39/2019   • Body piercing Ears   • Constipation 8/23/2021   • Decorative tattoo One on buttock   • Easy bruising    • Flatulence/gas pain/belching 12/30/2019   • Food intolerance     Milk, ice cream   • Headach rhythm, S1, S2 normal, no murmur or rub  Lungs: Clear without wheezes, rales, rhonchi. Abdomen: Soft, non-tender. + bowel sounds, no palpable organomegaly  Extremities: Without clubbing, cyanosis or edema.   Neurologic:  normal affect, cranial nerves cara

## 2021-10-05 ENCOUNTER — HOSPITAL ENCOUNTER (OUTPATIENT)
Dept: CT IMAGING | Facility: HOSPITAL | Age: 71
Discharge: HOME | End: 2021-10-05
Attending: INTERNAL MEDICINE
Payer: MEDICARE

## 2021-10-05 DIAGNOSIS — R93.5 ABNORMAL ULTRASOUND OF ABDOMEN: ICD-10-CM

## 2021-10-05 PROCEDURE — 82565 ASSAY OF CREATININE: CPT

## 2021-10-05 PROCEDURE — 74177 CT ABD & PELVIS W/CONTRAST: CPT | Performed by: INTERNAL MEDICINE

## 2021-10-17 ENCOUNTER — LAB ENCOUNTER (OUTPATIENT)
Dept: LAB | Facility: HOSPITAL | Age: 71
End: 2021-10-17
Attending: INTERNAL MEDICINE
Payer: MEDICARE

## 2021-10-17 DIAGNOSIS — Z01.818 PRE-OP TESTING: ICD-10-CM

## 2021-10-20 PROBLEM — K44.9 HIATAL HERNIA: Status: ACTIVE | Noted: 2021-10-20

## 2021-10-20 PROBLEM — R14.1 ABDOMINAL GAS PAIN: Status: ACTIVE | Noted: 2021-10-20

## 2021-10-20 PROBLEM — K29.60 EROSIVE GASTRITIS: Status: ACTIVE | Noted: 2021-10-20

## 2021-10-22 NOTE — TELEPHONE ENCOUNTER
Pt has an upcoming appointment with you on 11/03. Had labs done 08/27/21. Do you need any other labs to be completed prior to this appointment? Please advise.

## 2021-10-22 NOTE — TELEPHONE ENCOUNTER
Called pt to inform no further labs needed. Left a message. Advised pt to return call if she had further questions.

## 2021-10-25 ENCOUNTER — LAB ENCOUNTER (OUTPATIENT)
Dept: LAB | Age: 71
End: 2021-10-25
Attending: FAMILY MEDICINE
Payer: MEDICARE

## 2021-10-25 DIAGNOSIS — Z13.0 SCREENING FOR DEFICIENCY ANEMIA: ICD-10-CM

## 2021-10-25 DIAGNOSIS — R79.89 ELEVATED SERUM CREATININE: ICD-10-CM

## 2021-10-25 DIAGNOSIS — E78.5 HYPERLIPIDEMIA, UNSPECIFIED HYPERLIPIDEMIA TYPE: ICD-10-CM

## 2021-10-25 DIAGNOSIS — Z13.1 SCREENING FOR DIABETES MELLITUS: ICD-10-CM

## 2021-10-25 DIAGNOSIS — Z00.00 ENCOUNTER FOR ANNUAL HEALTH EXAMINATION: ICD-10-CM

## 2021-10-25 DIAGNOSIS — Z13.220 SCREENING, LIPID: ICD-10-CM

## 2021-10-25 PROCEDURE — 80061 LIPID PANEL: CPT

## 2021-10-25 PROCEDURE — 80053 COMPREHEN METABOLIC PANEL: CPT

## 2021-10-25 PROCEDURE — 36415 COLL VENOUS BLD VENIPUNCTURE: CPT

## 2021-10-25 PROCEDURE — 85025 COMPLETE CBC W/AUTO DIFF WBC: CPT

## 2021-11-03 ENCOUNTER — OFFICE VISIT (OUTPATIENT)
Dept: INTERNAL MEDICINE CLINIC | Facility: CLINIC | Age: 71
End: 2021-11-03
Payer: MEDICARE

## 2021-11-03 VITALS
WEIGHT: 137.19 LBS | BODY MASS INDEX: 26.93 KG/M2 | OXYGEN SATURATION: 97 % | HEIGHT: 60 IN | HEART RATE: 98 BPM | SYSTOLIC BLOOD PRESSURE: 124 MMHG | DIASTOLIC BLOOD PRESSURE: 64 MMHG

## 2021-11-03 DIAGNOSIS — Z00.00 ENCOUNTER FOR ANNUAL HEALTH EXAMINATION: Primary | ICD-10-CM

## 2021-11-03 DIAGNOSIS — K44.9 HIATAL HERNIA: ICD-10-CM

## 2021-11-03 DIAGNOSIS — K29.60 EROSIVE GASTRITIS: ICD-10-CM

## 2021-11-03 DIAGNOSIS — E78.5 HYPERLIPIDEMIA, UNSPECIFIED HYPERLIPIDEMIA TYPE: ICD-10-CM

## 2021-11-03 PROCEDURE — G0439 PPPS, SUBSEQ VISIT: HCPCS | Performed by: FAMILY MEDICINE

## 2021-11-03 NOTE — PATIENT INSTRUCTIONS
Fariba Campos SCREENING SCHEDULE   Tests on this list are recommended by your physician but may not be covered, or covered at this frequency, by your insurer. Please check with your insurance carrier before scheduling to verify coverage.    PREVENTATIVE SE DENSITOMETRY (CPT=77080) 02/26/2018      No recommendations at this time   Pap and Pelvic    Pap   Covered every 2 years for women at normal risk;  Annually if at high risk -  No recommendations at this time    Chlamydia Annually if high risk -  No recommen Association regarding Advance Directives.

## 2021-11-03 NOTE — PROGRESS NOTES
HPI:   Shayla Ivey is a 80year old female who presents for a Medicare Subsequent Annual Wellness visit (Pt already had Initial Annual Wellness). Encounter for annual health examination- walks some for exercise but not a lot. Tries to eat healthy. 10/25/2021    HDL 55 10/25/2021     (H) 10/25/2021    TRIG 61 10/25/2021          Last Chemistry Labs:   Lab Results   Component Value Date    AST 13 (L) 10/25/2021    ALT 11 (L) 10/25/2021    CA 9.1 10/25/2021    ALB 3.7 10/25/2021    TSH 1.140 02/ breath with exertion  CARDIOVASCULAR: denies chest pain on exertion  GI: denies abdominal pain, denies heartburn  : denies dysuria, vaginal discharge or itching, no complaint of urinary incontinence   MUSCULOSKELETAL: denies back pain  NEURO: denies head Neurologic: Normal       Vaccination History     Immunization History   Administered Date(s) Administered   • Covid-19 Vaccine Moderna 100 mcg/0.5 ml 02/12/2021, 03/12/2021, 10/27/2021   • FLU VAC High Dose 72 YRS & Older PRSV Free (08067) 10/03/2016, 11 well-being?: Social Interaction; Visiting Friends; Visiting Family     Supplementary Documentation:   Constantine Ladd SCREENING SCHEDULE   Tests on this list are recommended by your physician but may not be covered, or covered at this frequency, by your insurer. yearly for long-term glucocorticoid medication use (Steroids) Last Dexa Scan:    XR DEXA BONE DENSITOMETRY (CPT=77080) 02/26/2018      No recommendations at this time   Pap and Pelvic    Pap   Covered every 2 years for women at normal risk;  Annually if at

## 2021-11-04 PROBLEM — R14.1 ABDOMINAL GAS PAIN: Status: RESOLVED | Noted: 2021-10-20 | Resolved: 2021-11-04

## 2021-11-04 PROBLEM — R14.0 BLOATING: Status: RESOLVED | Noted: 2021-09-28 | Resolved: 2021-11-04

## 2021-11-04 PROBLEM — R10.9 ABDOMINAL DISCOMFORT: Status: RESOLVED | Noted: 2021-09-28 | Resolved: 2021-11-04

## 2022-04-01 ENCOUNTER — HOSPITAL ENCOUNTER (OUTPATIENT)
Dept: CT IMAGING | Facility: HOSPITAL | Age: 72
Discharge: HOME | End: 2022-04-01
Attending: FAMILY MEDICINE

## 2022-04-01 DIAGNOSIS — Z13.6 SCREENING FOR CARDIOVASCULAR CONDITION: ICD-10-CM

## 2022-04-05 ENCOUNTER — TELEPHONE (OUTPATIENT)
Dept: INTERNAL MEDICINE CLINIC | Facility: CLINIC | Age: 72
End: 2022-04-05

## 2022-04-09 ENCOUNTER — HOSPITAL ENCOUNTER (OUTPATIENT)
Dept: CT IMAGING | Facility: HOSPITAL | Age: 72
Discharge: HOME | End: 2022-04-09
Attending: INTERNAL MEDICINE
Payer: MEDICARE

## 2022-04-09 DIAGNOSIS — K76.89 LIVER CYST: ICD-10-CM

## 2022-04-09 LAB — CREAT BLD-MCNC: 0.9 MG/DL

## 2022-04-09 PROCEDURE — 82565 ASSAY OF CREATININE: CPT

## 2022-04-09 PROCEDURE — 74177 CT ABD & PELVIS W/CONTRAST: CPT | Performed by: INTERNAL MEDICINE

## 2022-04-09 RX ORDER — IOHEXOL 350 MG/ML
80 INJECTION, SOLUTION INTRAVENOUS
Status: COMPLETED | OUTPATIENT
Start: 2022-04-09 | End: 2022-04-09

## 2022-04-09 RX ADMIN — IOHEXOL 80 ML: 350 INJECTION, SOLUTION INTRAVENOUS at 14:04:00

## 2022-07-15 ENCOUNTER — TELEPHONE (OUTPATIENT)
Dept: INTERNAL MEDICINE CLINIC | Facility: CLINIC | Age: 72
End: 2022-07-15

## 2022-07-15 NOTE — TELEPHONE ENCOUNTER
Received visit summary from [address] [place] [address] Surgery for visit on 07/06/2022. Placed in AMS bin to review.

## 2022-08-24 ENCOUNTER — HOSPITAL ENCOUNTER (OUTPATIENT)
Age: 72
Discharge: HOME | End: 2022-08-24
Payer: MEDICARE

## 2022-08-24 VITALS
SYSTOLIC BLOOD PRESSURE: 155 MMHG | DIASTOLIC BLOOD PRESSURE: 91 MMHG | WEIGHT: 136 LBS | TEMPERATURE: 99 F | RESPIRATION RATE: 20 BRPM | HEART RATE: 73 BPM | HEIGHT: 60 IN | OXYGEN SATURATION: 96 % | BODY MASS INDEX: 26.7 KG/M2

## 2022-08-24 DIAGNOSIS — Z20.822 ENCOUNTER FOR LABORATORY TESTING FOR COVID-19 VIRUS: Primary | ICD-10-CM

## 2022-08-24 LAB — SARS-COV-2 RNA RESP QL NAA+PROBE: NOT DETECTED

## 2022-08-24 PROCEDURE — U0002 COVID-19 LAB TEST NON-CDC: HCPCS | Performed by: NURSE PRACTITIONER

## 2022-08-24 PROCEDURE — 99212 OFFICE O/P EST SF 10 MIN: CPT | Performed by: NURSE PRACTITIONER

## 2022-09-20 ENCOUNTER — TELEPHONE (OUTPATIENT)
Dept: INTERNAL MEDICINE CLINIC | Facility: CLINIC | Age: 72
End: 2022-09-20

## 2022-09-20 DIAGNOSIS — R03.0 ELEVATED BLOOD PRESSURE READING WITHOUT DIAGNOSIS OF HYPERTENSION: ICD-10-CM

## 2022-09-20 DIAGNOSIS — E78.5 HYPERLIPIDEMIA, UNSPECIFIED HYPERLIPIDEMIA TYPE: ICD-10-CM

## 2022-09-20 DIAGNOSIS — Z13.1 SCREENING FOR DIABETES MELLITUS: ICD-10-CM

## 2022-09-20 DIAGNOSIS — Z13.0 SCREENING FOR DEFICIENCY ANEMIA: ICD-10-CM

## 2022-09-20 DIAGNOSIS — Z00.00 ENCOUNTER FOR ANNUAL HEALTH EXAMINATION: Primary | ICD-10-CM

## 2022-09-20 DIAGNOSIS — Z13.220 SCREENING, LIPID: ICD-10-CM

## 2022-09-20 NOTE — TELEPHONE ENCOUNTER
Future Appointments   Date Time Provider Shreyas Shrestha   11/8/2022 10:00 AM Steve Hewitt,  [facility] [facility]     Orders to edward- Pt informed that labs need to be completed no sooner than 2 weeks prior to the appt.  Pt aware to fast-no call back required

## 2022-11-01 ENCOUNTER — LAB ENCOUNTER (OUTPATIENT)
Dept: LAB | Age: 72
End: 2022-11-01
Attending: FAMILY MEDICINE
Payer: MEDICARE

## 2022-11-01 DIAGNOSIS — Z13.0 SCREENING FOR DEFICIENCY ANEMIA: ICD-10-CM

## 2022-11-01 DIAGNOSIS — R03.0 ELEVATED BLOOD PRESSURE READING WITHOUT DIAGNOSIS OF HYPERTENSION: ICD-10-CM

## 2022-11-01 DIAGNOSIS — Z13.220 SCREENING, LIPID: ICD-10-CM

## 2022-11-01 DIAGNOSIS — E78.5 HYPERLIPIDEMIA, UNSPECIFIED HYPERLIPIDEMIA TYPE: ICD-10-CM

## 2022-11-01 DIAGNOSIS — Z00.00 ENCOUNTER FOR ANNUAL HEALTH EXAMINATION: ICD-10-CM

## 2022-11-01 DIAGNOSIS — Z13.1 SCREENING FOR DIABETES MELLITUS: ICD-10-CM

## 2022-11-01 LAB
ALBUMIN SERPL-MCNC: 3.5 G/DL (ref 3.4–5)
ALBUMIN/GLOB SERPL: 1.1 {RATIO} (ref 1–2)
ALP LIVER SERPL-CCNC: 75 U/L
ALT SERPL-CCNC: 10 U/L
ANION GAP SERPL CALC-SCNC: 2 MMOL/L (ref 0–18)
AST SERPL-CCNC: 15 U/L (ref 15–37)
BASOPHILS # BLD AUTO: 0.07 X10(3) UL (ref 0–0.2)
BASOPHILS NFR BLD AUTO: 1.4 %
BILIRUB SERPL-MCNC: 0.8 MG/DL (ref 0.1–2)
BUN BLD-MCNC: 13 MG/DL (ref 7–18)
CALCIUM BLD-MCNC: 8.4 MG/DL (ref 8.5–10.1)
CHLORIDE SERPL-SCNC: 109 MMOL/L (ref 98–112)
CHOLEST SERPL-MCNC: 193 MG/DL (ref ?–200)
CO2 SERPL-SCNC: 29 MMOL/L (ref 21–32)
CREAT BLD-MCNC: 0.8 MG/DL
EOSINOPHIL # BLD AUTO: 0.19 X10(3) UL (ref 0–0.7)
EOSINOPHIL NFR BLD AUTO: 3.7 %
ERYTHROCYTE [DISTWIDTH] IN BLOOD BY AUTOMATED COUNT: 12.5 %
FASTING PATIENT LIPID ANSWER: YES
FASTING STATUS PATIENT QL REPORTED: YES
GFR SERPLBLD BASED ON 1.73 SQ M-ARVRAT: 79 ML/MIN/1.73M2 (ref 60–?)
GLOBULIN PLAS-MCNC: 3.1 G/DL (ref 2.8–4.4)
GLUCOSE BLD-MCNC: 89 MG/DL (ref 70–99)
HCT VFR BLD AUTO: 38.8 %
HDLC SERPL-MCNC: 56 MG/DL (ref 40–59)
HGB BLD-MCNC: 13 G/DL
IMM GRANULOCYTES # BLD AUTO: 0.01 X10(3) UL (ref 0–1)
IMM GRANULOCYTES NFR BLD: 0.2 %
LDLC SERPL CALC-MCNC: 124 MG/DL (ref ?–100)
LYMPHOCYTES # BLD AUTO: 1.65 X10(3) UL (ref 1–4)
LYMPHOCYTES NFR BLD AUTO: 31.9 %
MCH RBC QN AUTO: 30.7 PG (ref 26–34)
MCHC RBC AUTO-ENTMCNC: 33.5 G/DL (ref 31–37)
MCV RBC AUTO: 91.5 FL
MONOCYTES # BLD AUTO: 0.4 X10(3) UL (ref 0.1–1)
MONOCYTES NFR BLD AUTO: 7.7 %
NEUTROPHILS # BLD AUTO: 2.85 X10 (3) UL (ref 1.5–7.7)
NEUTROPHILS # BLD AUTO: 2.85 X10(3) UL (ref 1.5–7.7)
NEUTROPHILS NFR BLD AUTO: 55.1 %
NONHDLC SERPL-MCNC: 137 MG/DL (ref ?–130)
OSMOLALITY SERPL CALC.SUM OF ELEC: 290 MOSM/KG (ref 275–295)
PLATELET # BLD AUTO: 181 10(3)UL (ref 150–450)
POTASSIUM SERPL-SCNC: 3.9 MMOL/L (ref 3.5–5.1)
PROT SERPL-MCNC: 6.6 G/DL (ref 6.4–8.2)
RBC # BLD AUTO: 4.24 X10(6)UL
SODIUM SERPL-SCNC: 140 MMOL/L (ref 136–145)
TRIGL SERPL-MCNC: 70 MG/DL (ref 30–149)
VLDLC SERPL CALC-MCNC: 12 MG/DL (ref 0–30)
WBC # BLD AUTO: 5.2 X10(3) UL (ref 4–11)

## 2022-11-01 PROCEDURE — 36415 COLL VENOUS BLD VENIPUNCTURE: CPT

## 2022-11-01 PROCEDURE — 80061 LIPID PANEL: CPT

## 2022-11-01 PROCEDURE — 80053 COMPREHEN METABOLIC PANEL: CPT

## 2022-11-01 PROCEDURE — 85025 COMPLETE CBC W/AUTO DIFF WBC: CPT

## 2022-11-08 ENCOUNTER — OFFICE VISIT (OUTPATIENT)
Dept: INTERNAL MEDICINE CLINIC | Facility: CLINIC | Age: 72
End: 2022-11-08
Payer: MEDICARE

## 2022-11-08 VITALS
HEART RATE: 70 BPM | OXYGEN SATURATION: 98 % | BODY MASS INDEX: 27.29 KG/M2 | WEIGHT: 139 LBS | HEIGHT: 60 IN | DIASTOLIC BLOOD PRESSURE: 72 MMHG | SYSTOLIC BLOOD PRESSURE: 136 MMHG

## 2022-11-08 DIAGNOSIS — K29.60 EROSIVE GASTRITIS: ICD-10-CM

## 2022-11-08 DIAGNOSIS — Z00.00 ENCOUNTER FOR ANNUAL HEALTH EXAMINATION: Primary | ICD-10-CM

## 2022-11-08 DIAGNOSIS — K44.9 HIATAL HERNIA: ICD-10-CM

## 2022-12-16 ENCOUNTER — PATIENT MESSAGE (OUTPATIENT)
Dept: INTERNAL MEDICINE CLINIC | Facility: CLINIC | Age: 72
End: 2022-12-16

## 2022-12-16 NOTE — TELEPHONE ENCOUNTER
Pt is calling to check on the status. Pt is trying to be an organ donor. Hoping AMS can review this message soon to let her know if she can start meds to get this process rolling.

## 2022-12-19 ENCOUNTER — OFFICE VISIT (OUTPATIENT)
Dept: INTERNAL MEDICINE CLINIC | Facility: CLINIC | Age: 72
End: 2022-12-19
Payer: MEDICARE

## 2022-12-19 VITALS
BODY MASS INDEX: 27.02 KG/M2 | HEART RATE: 86 BPM | SYSTOLIC BLOOD PRESSURE: 140 MMHG | DIASTOLIC BLOOD PRESSURE: 82 MMHG | WEIGHT: 137.63 LBS | HEIGHT: 60 IN | OXYGEN SATURATION: 99 %

## 2022-12-19 DIAGNOSIS — I10 ESSENTIAL HYPERTENSION: Primary | ICD-10-CM

## 2022-12-19 PROCEDURE — 99214 OFFICE O/P EST MOD 30 MIN: CPT | Performed by: FAMILY MEDICINE

## 2022-12-19 RX ORDER — LISINOPRIL 10 MG/1
10 TABLET ORAL DAILY
Qty: 90 TABLET | Refills: 0 | Status: SHIPPED | OUTPATIENT
Start: 2022-12-19 | End: 2023-12-14

## 2022-12-29 ENCOUNTER — LAB ENCOUNTER (OUTPATIENT)
Dept: LAB | Age: 72
End: 2022-12-29
Attending: FAMILY MEDICINE
Payer: MEDICARE

## 2022-12-29 DIAGNOSIS — I10 ESSENTIAL HYPERTENSION: ICD-10-CM

## 2022-12-29 LAB
ANION GAP SERPL CALC-SCNC: 5 MMOL/L (ref 0–18)
BUN BLD-MCNC: 14 MG/DL (ref 7–18)
CALCIUM BLD-MCNC: 8.8 MG/DL (ref 8.5–10.1)
CHLORIDE SERPL-SCNC: 109 MMOL/L (ref 98–112)
CO2 SERPL-SCNC: 29 MMOL/L (ref 21–32)
CREAT BLD-MCNC: 0.78 MG/DL
FASTING STATUS PATIENT QL REPORTED: YES
GFR SERPLBLD BASED ON 1.73 SQ M-ARVRAT: 81 ML/MIN/1.73M2 (ref 60–?)
GLUCOSE BLD-MCNC: 90 MG/DL (ref 70–99)
OSMOLALITY SERPL CALC.SUM OF ELEC: 296 MOSM/KG (ref 275–295)
POTASSIUM SERPL-SCNC: 4 MMOL/L (ref 3.5–5.1)
SODIUM SERPL-SCNC: 143 MMOL/L (ref 136–145)

## 2022-12-29 PROCEDURE — 36415 COLL VENOUS BLD VENIPUNCTURE: CPT

## 2022-12-29 PROCEDURE — 80048 BASIC METABOLIC PNL TOTAL CA: CPT

## 2023-01-03 ENCOUNTER — OFFICE VISIT (OUTPATIENT)
Dept: INTERNAL MEDICINE CLINIC | Facility: CLINIC | Age: 73
End: 2023-01-03
Payer: MEDICARE

## 2023-01-03 VITALS
HEART RATE: 71 BPM | OXYGEN SATURATION: 98 % | BODY MASS INDEX: 27.22 KG/M2 | WEIGHT: 138.63 LBS | DIASTOLIC BLOOD PRESSURE: 76 MMHG | HEIGHT: 60 IN | SYSTOLIC BLOOD PRESSURE: 138 MMHG

## 2023-01-03 DIAGNOSIS — I10 ESSENTIAL HYPERTENSION: Primary | ICD-10-CM

## 2023-01-03 PROCEDURE — 99213 OFFICE O/P EST LOW 20 MIN: CPT | Performed by: FAMILY MEDICINE

## 2023-01-03 RX ORDER — LISINOPRIL 40 MG/1
40 TABLET ORAL DAILY
Qty: 90 TABLET | Refills: 0 | Status: SHIPPED | OUTPATIENT
Start: 2023-01-03

## 2023-01-05 ENCOUNTER — HOSPITAL ENCOUNTER (EMERGENCY)
Facility: HOSPITAL | Age: 73
Discharge: HOME | End: 2023-01-06
Attending: EMERGENCY MEDICINE
Payer: MEDICARE

## 2023-01-05 ENCOUNTER — APPOINTMENT (OUTPATIENT)
Dept: GENERAL RADIOLOGY | Facility: HOSPITAL | Age: 73
End: 2023-01-05
Attending: EMERGENCY MEDICINE
Payer: MEDICARE

## 2023-01-05 DIAGNOSIS — I10 HYPERTENSION, UNSPECIFIED TYPE: Primary | ICD-10-CM

## 2023-01-05 PROCEDURE — 84484 ASSAY OF TROPONIN QUANT: CPT | Performed by: EMERGENCY MEDICINE

## 2023-01-05 PROCEDURE — 93005 ELECTROCARDIOGRAM TRACING: CPT

## 2023-01-05 PROCEDURE — 99285 EMERGENCY DEPT VISIT HI MDM: CPT

## 2023-01-05 PROCEDURE — 96374 THER/PROPH/DIAG INJ IV PUSH: CPT

## 2023-01-05 PROCEDURE — 96376 TX/PRO/DX INJ SAME DRUG ADON: CPT

## 2023-01-05 PROCEDURE — 80053 COMPREHEN METABOLIC PANEL: CPT | Performed by: EMERGENCY MEDICINE

## 2023-01-05 PROCEDURE — 71045 X-RAY EXAM CHEST 1 VIEW: CPT | Performed by: EMERGENCY MEDICINE

## 2023-01-05 PROCEDURE — 85025 COMPLETE CBC W/AUTO DIFF WBC: CPT | Performed by: EMERGENCY MEDICINE

## 2023-01-05 PROCEDURE — 93010 ELECTROCARDIOGRAM REPORT: CPT

## 2023-01-05 RX ORDER — LABETALOL HYDROCHLORIDE 5 MG/ML
20 INJECTION, SOLUTION INTRAVENOUS ONCE
Status: COMPLETED | OUTPATIENT
Start: 2023-01-05 | End: 2023-01-05

## 2023-01-06 ENCOUNTER — OFFICE VISIT (OUTPATIENT)
Dept: INTERNAL MEDICINE CLINIC | Facility: CLINIC | Age: 73
End: 2023-01-06
Payer: MEDICARE

## 2023-01-06 ENCOUNTER — HOSPITAL ENCOUNTER (EMERGENCY)
Dept: GENERAL RADIOLOGY | Age: 73
Discharge: HOME | End: 2023-01-06
Attending: FAMILY MEDICINE
Payer: MEDICARE

## 2023-01-06 VITALS
DIASTOLIC BLOOD PRESSURE: 81 MMHG | HEART RATE: 57 BPM | OXYGEN SATURATION: 95 % | HEIGHT: 60 IN | SYSTOLIC BLOOD PRESSURE: 156 MMHG | TEMPERATURE: 98 F | RESPIRATION RATE: 12 BRPM | WEIGHT: 138 LBS | BODY MASS INDEX: 27.09 KG/M2

## 2023-01-06 VITALS
BODY MASS INDEX: 27.09 KG/M2 | WEIGHT: 138 LBS | DIASTOLIC BLOOD PRESSURE: 78 MMHG | HEART RATE: 77 BPM | SYSTOLIC BLOOD PRESSURE: 140 MMHG | OXYGEN SATURATION: 98 % | HEIGHT: 60 IN

## 2023-01-06 DIAGNOSIS — I51.7 CARDIOMEGALY: ICD-10-CM

## 2023-01-06 DIAGNOSIS — I10 ESSENTIAL HYPERTENSION: Primary | ICD-10-CM

## 2023-01-06 LAB
ALBUMIN SERPL-MCNC: 3.7 G/DL (ref 3.4–5)
ALBUMIN/GLOB SERPL: 1.1 {RATIO} (ref 1–2)
ALP LIVER SERPL-CCNC: 88 U/L
ALT SERPL-CCNC: 10 U/L
ANION GAP SERPL CALC-SCNC: 2 MMOL/L (ref 0–18)
AST SERPL-CCNC: 24 U/L (ref 15–37)
ATRIAL RATE: 73 BPM
BASOPHILS # BLD AUTO: 0.07 X10(3) UL (ref 0–0.2)
BASOPHILS NFR BLD AUTO: 1 %
BILIRUB SERPL-MCNC: 0.6 MG/DL (ref 0.1–2)
BUN BLD-MCNC: 18 MG/DL (ref 7–18)
CALCIUM BLD-MCNC: 9.1 MG/DL (ref 8.5–10.1)
CHLORIDE SERPL-SCNC: 107 MMOL/L (ref 98–112)
CO2 SERPL-SCNC: 31 MMOL/L (ref 21–32)
CREAT BLD-MCNC: 0.89 MG/DL
EOSINOPHIL # BLD AUTO: 0.17 X10(3) UL (ref 0–0.7)
EOSINOPHIL NFR BLD AUTO: 2.5 %
ERYTHROCYTE [DISTWIDTH] IN BLOOD BY AUTOMATED COUNT: 12.7 %
GFR SERPLBLD BASED ON 1.73 SQ M-ARVRAT: 69 ML/MIN/1.73M2 (ref 60–?)
GLOBULIN PLAS-MCNC: 3.5 G/DL (ref 2.8–4.4)
GLUCOSE BLD-MCNC: 91 MG/DL (ref 70–99)
HCT VFR BLD AUTO: 38.4 %
HGB BLD-MCNC: 12.9 G/DL
IMM GRANULOCYTES # BLD AUTO: 0.02 X10(3) UL (ref 0–1)
IMM GRANULOCYTES NFR BLD: 0.3 %
LYMPHOCYTES # BLD AUTO: 2.34 X10(3) UL (ref 1–4)
LYMPHOCYTES NFR BLD AUTO: 33.8 %
MCH RBC QN AUTO: 30.6 PG (ref 26–34)
MCHC RBC AUTO-ENTMCNC: 33.6 G/DL (ref 31–37)
MCV RBC AUTO: 91 FL
MONOCYTES # BLD AUTO: 0.51 X10(3) UL (ref 0.1–1)
MONOCYTES NFR BLD AUTO: 7.4 %
NEUTROPHILS # BLD AUTO: 3.81 X10 (3) UL (ref 1.5–7.7)
NEUTROPHILS # BLD AUTO: 3.81 X10(3) UL (ref 1.5–7.7)
NEUTROPHILS NFR BLD AUTO: 55 %
OSMOLALITY SERPL CALC.SUM OF ELEC: 291 MOSM/KG (ref 275–295)
P AXIS: 61 DEGREES
P-R INTERVAL: 162 MS
PLATELET # BLD AUTO: 199 10(3)UL (ref 150–450)
POTASSIUM SERPL-SCNC: 3.8 MMOL/L (ref 3.5–5.1)
PROT SERPL-MCNC: 7.2 G/DL (ref 6.4–8.2)
Q-T INTERVAL: 416 MS
QRS DURATION: 118 MS
QTC CALCULATION (BEZET): 458 MS
R AXIS: -39 DEGREES
RBC # BLD AUTO: 4.22 X10(6)UL
SODIUM SERPL-SCNC: 140 MMOL/L (ref 136–145)
T AXIS: 60 DEGREES
TROPONIN I HIGH SENSITIVITY: 6 NG/L
VENTRICULAR RATE: 73 BPM
WBC # BLD AUTO: 6.9 X10(3) UL (ref 4–11)

## 2023-01-06 PROCEDURE — 71046 X-RAY EXAM CHEST 2 VIEWS: CPT | Performed by: FAMILY MEDICINE

## 2023-01-06 PROCEDURE — 99214 OFFICE O/P EST MOD 30 MIN: CPT | Performed by: FAMILY MEDICINE

## 2023-01-06 RX ORDER — LABETALOL HYDROCHLORIDE 5 MG/ML
20 INJECTION, SOLUTION INTRAVENOUS ONCE
Status: COMPLETED | OUTPATIENT
Start: 2023-01-06 | End: 2023-01-06

## 2023-01-06 RX ORDER — HYDROCHLOROTHIAZIDE 25 MG/1
25 TABLET ORAL DAILY
Qty: 90 TABLET | Refills: 0 | Status: SHIPPED | OUTPATIENT
Start: 2023-01-06

## 2023-01-06 NOTE — DISCHARGE INSTRUCTIONS
Follow-up with your primary care doctor within the next 24 hours. Discuss the mildly enlarged heart noted on the x-ray along with the need for an echocardiogram.  Take your blood pressure medicine as prescribed. Return for any headache, chest pain, Diffley breathing, or any concerning symptoms.

## 2023-01-06 NOTE — ED INITIAL ASSESSMENT (HPI)
Patient here with c/o hypertension. Patient was recently put on hypertension medication and has been monitoring it. Patient reports that it was 200/109. Patient denies headache.

## 2023-02-17 ENCOUNTER — TELEPHONE (OUTPATIENT)
Dept: INTERNAL MEDICINE CLINIC | Facility: CLINIC | Age: 73
End: 2023-02-17

## 2023-02-17 ENCOUNTER — PATIENT MESSAGE (OUTPATIENT)
Dept: INTERNAL MEDICINE CLINIC | Facility: CLINIC | Age: 73
End: 2023-02-17

## 2023-02-17 NOTE — TELEPHONE ENCOUNTER
Future Appointments   Date Time Provider Shreyas Shrestha   2/20/2023  3:15 PM Cara April, DO [facility]

## 2023-02-17 NOTE — TELEPHONE ENCOUNTER
Patient states she is trying to get on the Kidney Transplant for her finance. The transplant team has some concerns; BP medication and the nodule on her lung that has grown. No 30 min appointments available soon. Can we use a triage or HFU slot? ?    Per triage nurse/message to [facility] [address].

## 2023-02-20 ENCOUNTER — OFFICE VISIT (OUTPATIENT)
Dept: INTERNAL MEDICINE CLINIC | Facility: CLINIC | Age: 73
End: 2023-02-20
Payer: MEDICARE

## 2023-02-20 VITALS
OXYGEN SATURATION: 99 % | DIASTOLIC BLOOD PRESSURE: 62 MMHG | WEIGHT: 137.63 LBS | SYSTOLIC BLOOD PRESSURE: 120 MMHG | HEIGHT: 59.5 IN | HEART RATE: 77 BPM | BODY MASS INDEX: 27.38 KG/M2

## 2023-02-20 DIAGNOSIS — R91.1 NODULE OF LOWER LOBE OF RIGHT LUNG: ICD-10-CM

## 2023-02-20 DIAGNOSIS — J84.10 CALCIFIED GRANULOMA OF LUNG (HCC): ICD-10-CM

## 2023-02-20 DIAGNOSIS — I10 ESSENTIAL HYPERTENSION: Primary | ICD-10-CM

## 2023-02-20 PROCEDURE — 99214 OFFICE O/P EST MOD 30 MIN: CPT | Performed by: FAMILY MEDICINE

## 2023-02-20 RX ORDER — HYDROCHLOROTHIAZIDE 12.5 MG/1
12.5 TABLET ORAL DAILY
Qty: 90 TABLET | Refills: 0 | Status: SHIPPED | OUTPATIENT
Start: 2023-02-20 | End: 2024-02-15

## 2023-02-20 NOTE — PATIENT INSTRUCTIONS
Reduce your hydrochlorothiazide to 12.5mg daily. Continue checking your blood pressure and we will eventually see if we can stop it completely. Let me know if you continue to get low blood pressure readings (80s-90s/60s).   Schedule your CT chest.

## 2023-02-20 NOTE — TELEPHONE ENCOUNTER
From: Kris Sierra  To: Neha Jefferson DO  Sent: 2/17/2023 12:13 PM CST  Subject: Lung Nodule Findings    Dr Diana Crawford was at [facility] [employer] [facility] for my kidney donor testing/evaluation. They found in my past records that I have a Lung Nodule that has grown from 2021 to 2022 . Dr. Sulema Antony is the Doctor who tested and found my ulcer and hyrnia. The nodule was in his reports. Also, the transplant team has a concern with    my blood pressure medication and weight. Lastly, if I'm reading some of the [facility] My Chart results from yesterday, I'm Very Concerned with results and need help in understanding what I'm reading there. I'm calling today to schedule a visit with you.   Thank you,  Kris Sierra  [phone]

## 2023-02-24 ENCOUNTER — HOSPITAL ENCOUNTER (OUTPATIENT)
Dept: CT IMAGING | Facility: HOSPITAL | Age: 73
Discharge: HOME | End: 2023-02-24
Attending: FAMILY MEDICINE
Payer: MEDICARE

## 2023-02-24 DIAGNOSIS — R91.1 NODULE OF LOWER LOBE OF RIGHT LUNG: ICD-10-CM

## 2023-02-24 PROCEDURE — 71250 CT THORAX DX C-: CPT | Performed by: FAMILY MEDICINE

## 2023-02-28 DIAGNOSIS — R93.89 ABNORMAL CT OF THE CHEST: Primary | ICD-10-CM

## 2023-02-28 DIAGNOSIS — R91.8 MULTIPLE PULMONARY NODULES: ICD-10-CM

## 2023-02-28 DIAGNOSIS — N64.59 BREAST SIGNS AND SYMPTOMS: ICD-10-CM

## 2023-02-28 DIAGNOSIS — N63.20 MASS OF LEFT BREAST, UNSPECIFIED QUADRANT: ICD-10-CM

## 2023-03-13 ENCOUNTER — HOSPITAL ENCOUNTER (OUTPATIENT)
Dept: MAMMOGRAPHY | Facility: HOSPITAL | Age: 73
Discharge: HOME | End: 2023-03-13
Attending: FAMILY MEDICINE
Payer: MEDICARE

## 2023-03-13 DIAGNOSIS — N64.59 BREAST SIGNS AND SYMPTOMS: ICD-10-CM

## 2023-03-13 PROCEDURE — 76641 ULTRASOUND BREAST COMPLETE: CPT | Performed by: FAMILY MEDICINE

## 2023-03-13 PROCEDURE — 77065 DX MAMMO INCL CAD UNI: CPT | Performed by: FAMILY MEDICINE

## 2023-03-13 PROCEDURE — 77061 BREAST TOMOSYNTHESIS UNI: CPT | Performed by: FAMILY MEDICINE

## 2023-03-14 DIAGNOSIS — R92.8 ABNORMAL MAMMOGRAM: Primary | ICD-10-CM

## 2023-03-16 RX ORDER — LISINOPRIL 10 MG/1
TABLET ORAL
Qty: 90 TABLET | Refills: 0 | OUTPATIENT
Start: 2023-03-16

## 2023-04-03 RX ORDER — HYDROCHLOROTHIAZIDE 25 MG/1
25 TABLET ORAL DAILY
Qty: 90 TABLET | Refills: 0 | Status: SHIPPED | OUTPATIENT
Start: 2023-04-03

## 2023-04-03 RX ORDER — LISINOPRIL 40 MG/1
TABLET ORAL
Qty: 90 TABLET | Refills: 0 | Status: SHIPPED | OUTPATIENT
Start: 2023-04-03

## 2023-04-03 NOTE — TELEPHONE ENCOUNTER
Last visit- 02/20/2023 hypertension     Last refill- 01/03/2023 lisinopril 40mg QTY90 0R,  02/20/2023 hydrochlorothiazide 12.5mg QTY90 0R    Last labs- 01/05/2023 cbc, cmp  Ordered by Darcy Sutton MD  Future Appointments   Date Time Provider Shreyas Samuelsi   8/28/2023  9:00 AM [facility] CT MAIN RM3 [facility] CT [facility] AT [facility]   8/28/2023 10:00 AM [facility] SHREE RM1 [address] [facility]   9/13/2023  8:10 AM [facility] SHREE BREAST US [address] [facility]

## 2023-04-15 ENCOUNTER — PATIENT MESSAGE (OUTPATIENT)
Dept: INTERNAL MEDICINE CLINIC | Facility: CLINIC | Age: 73
End: 2023-04-15

## 2023-04-17 NOTE — TELEPHONE ENCOUNTER
LOV 2/20/23    Plan at that time was:  \"BP on low side. Reduce your hydrochlorothiazide to 12.5mg daily. Continue checking your blood pressure and we will eventually see if we can stop it completely. Let me know if you continue to get low blood pressure readings (80s-90s/60s). \"    Please advise

## 2023-04-17 NOTE — TELEPHONE ENCOUNTER
From: Latia La  To: Harley Campos DO  Sent: 4/15/2023 9:48 AM CDT  Subject: BP and Light Headed    3.292:10 pm 91/63 70 pulse  3/3 10:30 am 114/77 70  3/5 11:25 am 101/67 89  3/20 8:00 pm 114/89 83  3/25 10:00 am 95/63 97 Sitting,no activity  3/30 10:00 am 80/59 96  4/5 4:25 pm 96/68 87  4/15 9:20 am 106/64 102 Climbed stairs    When I bend over cleaning house or yard work, I get  very light headed and need to steady myself against a wall or tree until the dizziness passes. This is not normal for me. Also, when I climb stairs I hear my heart pounding in my chest and head. Not normal for me. What do you suggest?    Lastly, the Kidney Transplant Team did reject me as a possible donor for my fiance, Tenzin. I was very disappointed but it was the decision they made.     Latia La  [phone]

## 2023-05-18 RX ORDER — HYDROCHLOROTHIAZIDE 12.5 MG/1
TABLET ORAL
Qty: 90 TABLET | Refills: 0 | Status: SHIPPED | OUTPATIENT
Start: 2023-05-18

## 2023-07-03 RX ORDER — LISINOPRIL 40 MG/1
40 TABLET ORAL DAILY
Qty: 90 TABLET | Refills: 0 | Status: SHIPPED | OUTPATIENT
Start: 2023-07-03

## 2023-07-18 ENCOUNTER — TELEPHONE (OUTPATIENT)
Dept: INTERNAL MEDICINE CLINIC | Facility: CLINIC | Age: 73
End: 2023-07-18

## 2023-07-18 NOTE — TELEPHONE ENCOUNTER
[facility]s and dizziness/sob issues. Pt states she thinks she has had the right buttock pain for weeks, pain is down her right buttock and thigh, denies injury, has tried Tylenol twice with no real improvement, pain is intermittent but can flare. Pt notified to try a heating pad, rest and Tylenol for now and to call if worsening s/s. Pt verbalizes understanding. [facility].

## 2023-07-18 NOTE — TELEPHONE ENCOUNTER
Pt made the below appt through [facility]. High TE    Appointment For: Neo Sanchez ([id number])   Visit Type: InvisticsHART FOLLOW UP (6207)      7/26/2023   10:15 AM  15 mins. Steve Hewitt DO        [address]      Patient Comments:   Dizzy when bending and short of breath climbing stairs.      Also shooting pain in right buttock

## 2023-07-26 ENCOUNTER — OFFICE VISIT (OUTPATIENT)
Dept: INTERNAL MEDICINE CLINIC | Facility: CLINIC | Age: 73
End: 2023-07-26
Payer: MEDICARE

## 2023-07-26 VITALS
HEIGHT: 60 IN | HEART RATE: 66 BPM | DIASTOLIC BLOOD PRESSURE: 72 MMHG | SYSTOLIC BLOOD PRESSURE: 116 MMHG | WEIGHT: 137 LBS | TEMPERATURE: 97 F | BODY MASS INDEX: 26.9 KG/M2

## 2023-07-26 DIAGNOSIS — R42 DIZZINESS: Primary | ICD-10-CM

## 2023-07-26 DIAGNOSIS — M54.31 RIGHT SIDED SCIATICA: ICD-10-CM

## 2023-07-26 DIAGNOSIS — I10 ESSENTIAL HYPERTENSION: ICD-10-CM

## 2023-07-26 PROCEDURE — 99214 OFFICE O/P EST MOD 30 MIN: CPT | Performed by: FAMILY MEDICINE

## 2023-07-26 RX ORDER — AMLODIPINE BESYLATE 5 MG/1
5 TABLET ORAL DAILY
Qty: 90 TABLET | Refills: 0 | Status: SHIPPED | OUTPATIENT
Start: 2023-07-26

## 2023-07-26 RX ORDER — HYDROCHLOROTHIAZIDE 25 MG/1
25 TABLET ORAL DAILY
Qty: 90 TABLET | Refills: 0 | Status: SHIPPED | OUTPATIENT
Start: 2023-07-26

## 2023-07-28 NOTE — PROGRESS NOTES
Subjective:   Patient ID: Rahat Ivy is a 68year old female. HPI Here for f/u. Patient is taking BP meds as prescribed and checks BP regularly and is getting <140/90 average. Continues to have some dizziness when she puts her head down. No lightheadedness. Also notes pain in right buttock that radiates down the back of her right leg. History/Other:   Review of Systems   Respiratory:  Negative for chest tightness and shortness of breath. Cardiovascular:  Negative for chest pain and palpitations. Neurological:  Positive for dizziness. Negative for headaches. Current Outpatient Medications   Medication Sig Dispense Refill    hydroCHLOROthiazide 25 MG Oral Tab Take 1 tablet (25 mg total) by mouth daily. 90 tablet 0    amLODIPine 5 MG Oral Tab Take 1 tablet (5 mg total) by mouth daily. 90 tablet 0     Allergies:  Aspirin                 HIVES  Norco [Hydrocodone-*      Nsaids                  HIVES    Objective:   Physical Exam  Vitals reviewed. Constitutional:       Appearance: Normal appearance. She is well-developed. HENT:      Head: Normocephalic and atraumatic. Pulmonary:      Effort: Pulmonary effort is normal.   Musculoskeletal:        Legs:       Comments: TTP   Neurological:      Mental Status: She is alert. Psychiatric:         Mood and Affect: Mood normal.         Behavior: Behavior normal.         Assessment & Plan:   Dizziness  (primary encounter diagnosis)  Essential hypertension  Right sided sciatica  Possibly side effect of Lisinopril as started when Lisinopril was started. Stop lisinopril and start amlodipine 5mg. Increase hydrochlorothiazide to 25mg. Continue checking BP daily and call if >140/90. F/u in 2 weeks if symptoms continue despite med change. Gave handout on stretches. Use massage ball to area as well. No orders of the defined types were placed in this encounter.       Meds This Visit:  Requested Prescriptions     Signed Prescriptions Disp Refills hydroCHLOROthiazide 25 MG Oral Tab 90 tablet 0     Sig: Take 1 tablet (25 mg total) by mouth daily. amLODIPine 5 MG Oral Tab 90 tablet 0     Sig: Take 1 tablet (5 mg total) by mouth daily.        Imaging & Referrals:  None

## 2023-08-16 ENCOUNTER — TELEPHONE (OUTPATIENT)
Dept: INTERNAL MEDICINE CLINIC | Facility: CLINIC | Age: 73
End: 2023-08-16

## 2023-08-16 DIAGNOSIS — Z00.00 ENCOUNTER FOR ANNUAL HEALTH EXAMINATION: ICD-10-CM

## 2023-08-16 DIAGNOSIS — Z13.0 ENCOUNTER FOR SCREENING FOR DISEASES OF THE BLOOD AND BLOOD-FORMING ORGANS AND CERTAIN DISORDERS INVOLVING THE IMMUNE MECHANISM: ICD-10-CM

## 2023-08-16 DIAGNOSIS — I10 ESSENTIAL HYPERTENSION: Primary | ICD-10-CM

## 2023-08-16 DIAGNOSIS — E78.5 HYPERLIPIDEMIA, UNSPECIFIED HYPERLIPIDEMIA TYPE: ICD-10-CM

## 2023-08-16 DIAGNOSIS — Z13.1 ENCOUNTER FOR SCREENING FOR DIABETES MELLITUS: ICD-10-CM

## 2023-08-16 NOTE — TELEPHONE ENCOUNTER
Annual Physical   Future Appointments   Date Time Provider Shreyas Shrestha   8/28/2023  9:00 AM [facility] CT MAIN RM3 [facility] CT [email or web address] AT [facility]   8/28/2023 10:00 AM [facility] SHREE RM1 [address] [facility],    9/13/2023  8:10 AM [facility] SHREE BREAST US [address] [facility],    11/6/2023 10:00 AM Angel Siddiqui DO EM[facility] [address] [facility]       Lab is [facility]  Pt aware to fast and to complete labs no sooner than 2 weeks prior to physical   No call back required

## 2023-08-17 RX ORDER — HYDROCHLOROTHIAZIDE 12.5 MG/1
TABLET ORAL
Qty: 90 TABLET | Refills: 0 | OUTPATIENT
Start: 2023-08-17

## 2023-08-28 ENCOUNTER — TELEPHONE (OUTPATIENT)
Dept: MAMMOGRAPHY | Facility: HOSPITAL | Age: 73
End: 2023-08-28

## 2023-08-28 ENCOUNTER — HOSPITAL ENCOUNTER (OUTPATIENT)
Dept: CT IMAGING | Facility: HOSPITAL | Age: 73
Discharge: HOME | End: 2023-08-28
Attending: FAMILY MEDICINE
Payer: MEDICARE

## 2023-08-28 ENCOUNTER — HOSPITAL ENCOUNTER (OUTPATIENT)
Dept: MAMMOGRAPHY | Facility: HOSPITAL | Age: 73
Discharge: HOME | End: 2023-08-28
Attending: FAMILY MEDICINE
Payer: MEDICARE

## 2023-08-28 DIAGNOSIS — R92.8 ABNORMAL MAMMOGRAM: ICD-10-CM

## 2023-08-28 DIAGNOSIS — R91.8 MULTIPLE PULMONARY NODULES: ICD-10-CM

## 2023-08-28 PROCEDURE — 76642 ULTRASOUND BREAST LIMITED: CPT | Performed by: FAMILY MEDICINE

## 2023-08-28 PROCEDURE — 77066 DX MAMMO INCL CAD BI: CPT | Performed by: FAMILY MEDICINE

## 2023-08-28 PROCEDURE — 71250 CT THORAX DX C-: CPT | Performed by: FAMILY MEDICINE

## 2023-08-28 PROCEDURE — 77062 BREAST TOMOSYNTHESIS BI: CPT | Performed by: FAMILY MEDICINE

## 2023-08-28 NOTE — TELEPHONE ENCOUNTER
This Breast Care RN phoned pt re left breast 1 site ultrasound guided biopsy recommendation by Dr. Naa Pena for mass. Procedure reviewed and all questions answered. Confirmed pt did receive educational handouts and reviewed these with the patient. On the day of the biopsy, pt instructed to take Tylenol 1000mg PO, eat a light meal & bring or wear a sports bra. Post biopsy care also reviewed with pt to include NO lifting more than 5lbs, no exercising or housework (limit upper body movement) for 24-48 hrs post biopsy. Patient denies blood thinners, bleeding disorders, liver disease, and chemo. Pt verbalized understanding. Our breast center schedulers will be calling to schedule an appt that is convenient for pt.

## 2023-09-01 ENCOUNTER — HOSPITAL ENCOUNTER (OUTPATIENT)
Dept: MAMMOGRAPHY | Facility: HOSPITAL | Age: 73
Discharge: HOME | End: 2023-09-01
Attending: FAMILY MEDICINE
Payer: MEDICARE

## 2023-09-01 DIAGNOSIS — N63.0 BREAST NODULE: ICD-10-CM

## 2023-09-01 DIAGNOSIS — R92.8 ABNORMAL MAMMOGRAM: ICD-10-CM

## 2023-09-01 PROCEDURE — 88305 TISSUE EXAM BY PATHOLOGIST: CPT | Performed by: FAMILY MEDICINE

## 2023-09-01 PROCEDURE — 19083 BX BREAST 1ST LESION US IMAG: CPT | Performed by: FAMILY MEDICINE

## 2023-09-01 PROCEDURE — 77065 DX MAMMO INCL CAD UNI: CPT | Performed by: FAMILY MEDICINE

## 2023-09-05 ENCOUNTER — OFFICE VISIT (OUTPATIENT)
Facility: CLINIC | Age: 73
End: 2023-09-05
Payer: MEDICARE

## 2023-09-05 ENCOUNTER — TELEPHONE (OUTPATIENT)
Dept: GENERAL RADIOLOGY | Facility: HOSPITAL | Age: 73
End: 2023-09-05

## 2023-09-05 ENCOUNTER — TELEPHONE (OUTPATIENT)
Dept: INTERNAL MEDICINE CLINIC | Facility: CLINIC | Age: 73
End: 2023-09-05

## 2023-09-05 VITALS
DIASTOLIC BLOOD PRESSURE: 60 MMHG | BODY MASS INDEX: 27.06 KG/M2 | HEIGHT: 59.5 IN | OXYGEN SATURATION: 98 % | RESPIRATION RATE: 16 BRPM | SYSTOLIC BLOOD PRESSURE: 110 MMHG | WEIGHT: 136 LBS | HEART RATE: 64 BPM

## 2023-09-05 DIAGNOSIS — R91.1 PULMONARY NODULE: Primary | ICD-10-CM

## 2023-09-05 DIAGNOSIS — N63.0 BREAST NODULE: ICD-10-CM

## 2023-09-05 DIAGNOSIS — R92.8 ABNORMAL MAMMOGRAM: Primary | ICD-10-CM

## 2023-09-05 PROCEDURE — 99204 OFFICE O/P NEW MOD 45 MIN: CPT | Performed by: INTERNAL MEDICINE

## 2023-09-05 NOTE — TELEPHONE ENCOUNTER
Patient received her mammogram results from the imaging department and they want patient to have left breast ultrasound done in 3 months. Patient asking for AMS to place order. Patient asking for callback from [address] [place] [address] with her Mammo and Biopsy results.

## 2023-09-05 NOTE — IMAGING NOTE
1012: Spoke with Rubi Carbone post ultrasound guided left breast biopsy. Introduced myself as breast care coordinator and informed Ms. Alireza Andrade of the purpose of my call. Name and date of birth verified by patient. Reinforced post biopsy care and instruction. Ms. Alireza Andrade denies any issues with biopsy site- bleeding, drainage, redness, tenderness. Pathology results and recommendations discussed as follows: benign finding  Final Diagnosis:   Ultrasound-guided core biopsy, breast, left, 3:00, 6 cm from the nipple:  -Breast tissue with dense stromal fibrosis associated with microcalcifications.  -Negative for malignancy. See EMR for complete pathology report    Per Dr. Og Frank- Recommend patient follow-up with 3 month follow-up ultrasound. This mass was biopsied due to interval increase in size   since March 2023. If there is continued interval increase in size despite benign biopsy, consider surgical consultation. Rubi Carbone verbalized understanding and agreement to the above.

## 2023-09-05 NOTE — PATIENT INSTRUCTIONS
Plan-- call me on Friday            - see me in 3 months             -    Balta Lomax MD  Pulmonary Medicine  9/5/2023

## 2023-09-05 NOTE — TELEPHONE ENCOUNTER
Per breast bx results, \"ADDENDUM:      Pathology demonstrates breast tissue with dense stromal fibrosis associated with microcalcifications. No evidence of malignancy. Recommend patient follow-up with 3 month follow-up ultrasound. This mass was biopsied due to interval increase in size   since March 2023. If there is continued interval increase in size despite benign biopsy, consider surgical consultation\". Order pended. Ok to inform pt of results (no evidence of malignancy)?

## 2023-09-05 NOTE — TELEPHONE ENCOUNTER
Order placed. Please let patient know and let her know if she notices increase in size of lump in the next three months to let us know.

## 2023-09-05 NOTE — TELEPHONE ENCOUNTER
Called and spoke to pt. Informed her no signs of malignancy. Rpt US in 3 mos to continue to monitor and contact us if lump grows at all before 3 mos. Order has been placed. Pt stated understanding and agreed to plan. Pt also added that she saw Dr. Sukh Farrell today for lung nodule. Dr. Sukh Farrell recommended rpting CT in 3 mos. Pt asked if we order this or Dr. Sukh Farrell. Informed her this order would come from Dr. Sukh Farrell. She will contact there office regarding order so she can get it scheduled.

## 2023-09-06 ENCOUNTER — TELEPHONE (OUTPATIENT)
Facility: CLINIC | Age: 73
End: 2023-09-06

## 2023-09-06 NOTE — TELEPHONE ENCOUNTER
Pt has PET scan scheduled for 9-11-23 and does not think she can be in the tube for 45 min without getting some medication for \"panic. \"  Please advise.

## 2023-09-07 RX ORDER — ALPRAZOLAM 0.25 MG/1
0.25 TABLET ORAL ONCE
Qty: 1 TABLET | Refills: 0 | Status: SHIPPED | OUTPATIENT
Start: 2023-09-07 | End: 2023-09-07

## 2023-09-07 NOTE — TELEPHONE ENCOUNTER
Per Dr. Yolie Delacruz, ok for Xanax 0.25 #1. Pt to take 1 hour prior to PET scan. Pt instructed that she will need to have  to and from PET scan. Pt verbalizes understanding. Rx pended for Dr. Raysa plata.

## 2023-09-11 ENCOUNTER — HOSPITAL ENCOUNTER (OUTPATIENT)
Dept: NUCLEAR MEDICINE | Facility: HOSPITAL | Age: 73
Discharge: HOME | End: 2023-09-11
Attending: INTERNAL MEDICINE
Payer: MEDICARE

## 2023-09-11 DIAGNOSIS — R91.1 PULMONARY NODULE: ICD-10-CM

## 2023-09-11 LAB — GLUCOSE BLD-MCNC: 87 MG/DL (ref 70–99)

## 2023-09-11 PROCEDURE — 78815 PET IMAGE W/CT SKULL-THIGH: CPT | Performed by: INTERNAL MEDICINE

## 2023-09-11 PROCEDURE — 82962 GLUCOSE BLOOD TEST: CPT

## 2023-09-12 ENCOUNTER — TELEPHONE (OUTPATIENT)
Facility: CLINIC | Age: 73
End: 2023-09-12

## 2023-09-19 PROBLEM — K57.90 DIVERTICULOSIS: Status: ACTIVE | Noted: 2023-09-19

## 2023-09-19 PROBLEM — D12.5 BENIGN NEOPLASM OF SIGMOID COLON: Status: ACTIVE | Noted: 2023-09-19

## 2023-09-19 PROBLEM — D12.3 BENIGN NEOPLASM OF TRANSVERSE COLON: Status: ACTIVE | Noted: 2023-09-19

## 2023-09-19 PROBLEM — K64.8 INTERNAL HEMORRHOIDS: Status: ACTIVE | Noted: 2023-09-19

## 2023-09-19 PROBLEM — R93.3 ABNORMAL FINDINGS ON DIAGNOSTIC IMAGING OF DIGESTIVE SYSTEM: Status: ACTIVE | Noted: 2023-09-19

## 2023-09-28 NOTE — TELEPHONE ENCOUNTER
Last OV 7/26/23   Last PE 11/08/22   Last REFILL   lisinopril 40 MG Oral Tab (Discontinued) 90 tablet 0 7/3/2023 7/26/2023     Last LABS 2/16/23 cbc lipid    Future Appointments   Date Time Provider Shreyas Henrietta   10/9/2023  3:30 PM Evan Resendiz MD [facility]   11/6/2023 10:00 AM Sergo Gonzáles DO [facility] [address] [facility]   12/5/2023  9:00 AM Pratibha Núñez MD EEMG Pulm [facility] [place]   12/6/2023  8:10 AM [address] [facility] [address] [facility]         Per PROTOCOL? Hypertension Medications Protocol [id number]/28/2023 12:23 AM   Protocol Details CMP or BMP in past 12 months    Last serum creatinine< 2.0    Appointment in past 6 or next 3 months     Please Advise? Was previously discontinued.

## 2023-09-29 RX ORDER — LISINOPRIL 40 MG/1
40 TABLET ORAL DAILY
Qty: 90 TABLET | Refills: 0 | OUTPATIENT
Start: 2023-09-29

## 2023-10-09 ENCOUNTER — OFFICE VISIT (OUTPATIENT)
Facility: LOCATION | Age: 73
End: 2023-10-09
Payer: MEDICARE

## 2023-10-09 VITALS — HEART RATE: 87 BPM | TEMPERATURE: 97 F

## 2023-10-09 DIAGNOSIS — K64.4 ANORECTAL SKIN TAGS: Primary | ICD-10-CM

## 2023-10-09 PROCEDURE — 99203 OFFICE O/P NEW LOW 30 MIN: CPT | Performed by: STUDENT IN AN ORGANIZED HEALTH CARE EDUCATION/TRAINING PROGRAM

## 2023-10-09 PROCEDURE — 46600 DIAGNOSTIC ANOSCOPY SPX: CPT | Performed by: STUDENT IN AN ORGANIZED HEALTH CARE EDUCATION/TRAINING PROGRAM

## 2023-10-09 RX ORDER — AMLODIPINE BESYLATE 5 MG/1
5 TABLET ORAL DAILY
Qty: 90 TABLET | Refills: 0 | Status: SHIPPED | OUTPATIENT
Start: 2023-10-09

## 2023-10-09 NOTE — TELEPHONE ENCOUNTER
Last OV 7/26/23   Last PE 11/08/21   Last REFILL   amLODIPine 5 MG Oral Tab 90 tablet 0 7/26/2023     Last LABS Lipid cbc cmp 8/19/23    Future Appointments   Date Time Provider Shreyas Samuelsi   10/9/2023  3:30 PM Delvin Kaur MD [facility]   11/6/2023 10:00 AM Trish Shaffer DO [facility] [address] [facility]   12/5/2023  9:00 AM Ciara Woods MD E[facility] Pulm [facility] [place]   12/6/2023  8:10 AM [address] [facility] US [address] [facility]         Per PROTOCOL? Hypertension Medications Protocol Ysqrre24/09/2023 10:00 AM   Protocol Details CMP or BMP in past 12 months    Last serum creatinine< 2.0    Appointment in past 6 or next 3 months        Please Advise?

## 2023-10-10 PROBLEM — K64.4 ANORECTAL SKIN TAGS: Status: ACTIVE | Noted: 2023-10-10

## 2023-10-23 RX ORDER — HYDROCHLOROTHIAZIDE 25 MG/1
25 TABLET ORAL DAILY
Qty: 90 TABLET | Refills: 0 | Status: SHIPPED | OUTPATIENT
Start: 2023-10-23

## 2023-10-23 NOTE — TELEPHONE ENCOUNTER
Last OV 7/26/23   Last PE 11/08/22   Last REFILL   hydroCHLOROthiazide 25 MG Oral Tab 90 tablet 0 7/26/2023     Last LABS cbc cmp lipid 8/19/23     Future Appointments   Date Time Provider Shreyas Shrestha   11/6/2023 10:00 AM Kassie Griffith DO [facility] [address] [facility]   12/5/2023  9:00 AM Hal Powell MD [facility] Pulm [facility] [place]   12/6/2023  8:10 AM [address] [facility] [address] [facility]   4/8/2024  9:00 AM Sanna Schofield MD [facility]         Per PROTOCOL?     Hypertension Medications Protocol [id number]/21/2023 08:53 AM   Protocol Details CMP or BMP in past 12 months    Last serum creatinine< 2.0    Appointment in past 6 or next 3 months        Please Advise

## 2023-10-28 ENCOUNTER — PATIENT MESSAGE (OUTPATIENT)
Dept: INTERNAL MEDICINE CLINIC | Facility: CLINIC | Age: 73
End: 2023-10-28

## 2023-10-28 NOTE — TELEPHONE ENCOUNTER
From: Sherry Amador  To: Rj Wright  Sent: 10/28/2023 1:00 PM CDT  Subject: Milli Monzon BP Readings    Hi Dr. Lisset Dubois,  As of Sept. 21s, I cut my Hydrochlorothiazide (25 mg) in half. I also take Amlodipine 5 mg., one each in the morning. My BP log is:  Sept. 29 95/64  Oct. 4 112/70  Oct. 6 110/73  Oct. 14 99/62  Oct. 18 110/74  Oct. 21 103/72  Oct. 26 117/76    I still feel that I am out of breath when I shouldn't be. Example: Changing sheets on jimenez bed - I get slightly out of breath. Sweeping leaves off patio with a large garage broom - again, out of breath and slightly difficult to talk because I had to catch my breath. Not usual for me. I will see you on November 6th for my wellness check up and to discuss all my other recent tests, findings, follow ups with Rahat Armstrong and Nathaniel Amaya.     Sherry Amador  [phone]

## 2023-10-30 ENCOUNTER — LAB ENCOUNTER (OUTPATIENT)
Dept: LAB | Age: 73
End: 2023-10-30
Attending: FAMILY MEDICINE
Payer: MEDICARE

## 2023-10-30 DIAGNOSIS — Z00.00 ENCOUNTER FOR ANNUAL HEALTH EXAMINATION: ICD-10-CM

## 2023-10-30 DIAGNOSIS — I10 ESSENTIAL HYPERTENSION: ICD-10-CM

## 2023-10-30 DIAGNOSIS — E78.5 HYPERLIPIDEMIA, UNSPECIFIED HYPERLIPIDEMIA TYPE: ICD-10-CM

## 2023-10-30 DIAGNOSIS — Z13.0 ENCOUNTER FOR SCREENING FOR DISEASES OF THE BLOOD AND BLOOD-FORMING ORGANS AND CERTAIN DISORDERS INVOLVING THE IMMUNE MECHANISM: ICD-10-CM

## 2023-10-30 DIAGNOSIS — Z13.1 ENCOUNTER FOR SCREENING FOR DIABETES MELLITUS: ICD-10-CM

## 2023-10-30 LAB
ALBUMIN SERPL-MCNC: 3.6 G/DL (ref 3.4–5)
ALBUMIN/GLOB SERPL: 1.1 {RATIO} (ref 1–2)
ALP LIVER SERPL-CCNC: 69 U/L
ALT SERPL-CCNC: 9 U/L
ANION GAP SERPL CALC-SCNC: 4 MMOL/L (ref 0–18)
AST SERPL-CCNC: 11 U/L (ref 15–37)
BASOPHILS # BLD AUTO: 0.06 X10(3) UL (ref 0–0.2)
BASOPHILS NFR BLD AUTO: 1.1 %
BILIRUB SERPL-MCNC: 0.9 MG/DL (ref 0.1–2)
BUN BLD-MCNC: 24 MG/DL (ref 7–18)
CALCIUM BLD-MCNC: 9 MG/DL (ref 8.5–10.1)
CHLORIDE SERPL-SCNC: 106 MMOL/L (ref 98–112)
CHOLEST SERPL-MCNC: 210 MG/DL (ref ?–200)
CO2 SERPL-SCNC: 30 MMOL/L (ref 21–32)
CREAT BLD-MCNC: 0.91 MG/DL
EGFRCR SERPLBLD CKD-EPI 2021: 67 ML/MIN/1.73M2 (ref 60–?)
EOSINOPHIL # BLD AUTO: 0.18 X10(3) UL (ref 0–0.7)
EOSINOPHIL NFR BLD AUTO: 3.4 %
ERYTHROCYTE [DISTWIDTH] IN BLOOD BY AUTOMATED COUNT: 12.2 %
FASTING PATIENT LIPID ANSWER: YES
FASTING STATUS PATIENT QL REPORTED: YES
GLOBULIN PLAS-MCNC: 3.2 G/DL (ref 2.8–4.4)
GLUCOSE BLD-MCNC: 95 MG/DL (ref 70–99)
HCT VFR BLD AUTO: 36.9 %
HDLC SERPL-MCNC: 61 MG/DL (ref 40–59)
HGB BLD-MCNC: 12.3 G/DL
IMM GRANULOCYTES # BLD AUTO: 0.01 X10(3) UL (ref 0–1)
IMM GRANULOCYTES NFR BLD: 0.2 %
LDLC SERPL CALC-MCNC: 136 MG/DL (ref ?–100)
LYMPHOCYTES # BLD AUTO: 1.2 X10(3) UL (ref 1–4)
LYMPHOCYTES NFR BLD AUTO: 22.6 %
MCH RBC QN AUTO: 30.5 PG (ref 26–34)
MCHC RBC AUTO-ENTMCNC: 33.3 G/DL (ref 31–37)
MCV RBC AUTO: 91.6 FL
MONOCYTES # BLD AUTO: 0.42 X10(3) UL (ref 0.1–1)
MONOCYTES NFR BLD AUTO: 7.9 %
NEUTROPHILS # BLD AUTO: 3.44 X10 (3) UL (ref 1.5–7.7)
NEUTROPHILS # BLD AUTO: 3.44 X10(3) UL (ref 1.5–7.7)
NEUTROPHILS NFR BLD AUTO: 64.8 %
NONHDLC SERPL-MCNC: 149 MG/DL (ref ?–130)
OSMOLALITY SERPL CALC.SUM OF ELEC: 294 MOSM/KG (ref 275–295)
PLATELET # BLD AUTO: 208 10(3)UL (ref 150–450)
POTASSIUM SERPL-SCNC: 3.5 MMOL/L (ref 3.5–5.1)
PROT SERPL-MCNC: 6.8 G/DL (ref 6.4–8.2)
RBC # BLD AUTO: 4.03 X10(6)UL
SODIUM SERPL-SCNC: 140 MMOL/L (ref 136–145)
TRIGL SERPL-MCNC: 72 MG/DL (ref 30–149)
VLDLC SERPL CALC-MCNC: 13 MG/DL (ref 0–30)
WBC # BLD AUTO: 5.3 X10(3) UL (ref 4–11)

## 2023-10-30 PROCEDURE — 80061 LIPID PANEL: CPT

## 2023-10-30 PROCEDURE — 80053 COMPREHEN METABOLIC PANEL: CPT

## 2023-10-30 PROCEDURE — 85025 COMPLETE CBC W/AUTO DIFF WBC: CPT

## 2023-10-30 PROCEDURE — 36415 COLL VENOUS BLD VENIPUNCTURE: CPT

## 2023-11-18 ENCOUNTER — OFFICE VISIT (OUTPATIENT)
Dept: INTERNAL MEDICINE CLINIC | Facility: CLINIC | Age: 73
End: 2023-11-18
Payer: MEDICARE

## 2023-11-18 VITALS
HEART RATE: 61 BPM | SYSTOLIC BLOOD PRESSURE: 114 MMHG | HEIGHT: 59.45 IN | BODY MASS INDEX: 27.29 KG/M2 | DIASTOLIC BLOOD PRESSURE: 68 MMHG | WEIGHT: 137.19 LBS | OXYGEN SATURATION: 99 %

## 2023-11-18 DIAGNOSIS — Z00.00 ENCOUNTER FOR ANNUAL HEALTH EXAMINATION: Primary | ICD-10-CM

## 2023-11-18 DIAGNOSIS — I10 ESSENTIAL HYPERTENSION: ICD-10-CM

## 2023-11-27 ENCOUNTER — PATIENT MESSAGE (OUTPATIENT)
Dept: INTERNAL MEDICINE CLINIC | Facility: CLINIC | Age: 73
End: 2023-11-27

## 2023-11-27 NOTE — ED INITIAL ASSESSMENT (HPI)
Blood pressure 130/70, pulse 61, height 5' 11\" (1.803 m), weight 195 lb (88.5 kg). Presents today following up for thoracic back pain for a week. No injuries. Concerned about lung issues. Quit smoking 30 years ago.     Objective tissue texture change noted thoracic area right side    Assessment thoracic back pain    Plan HVLA with success also chest x-ray order entered Patient reports mid to lower back pain since Thursday after moving some heavy furniture, worsening since then. Last took 1 g tylenol at 3 pm today. Able to ambulate.

## 2023-12-01 NOTE — TELEPHONE ENCOUNTER
From: Denis Tejada  To: Alice Alanison  Sent: 11/27/2023 11:02 AM CST  Subject: BP Readings    On November 18th during my Wellness checkup Dr. Milad Nair asked me to stop taking the Hydrochlorothiazide (12.5 mg) and continue taking the Amlodipine (5 mg) daily. My BP readings are:  Nov. 20 101/65  Nov. 21 112/66  Nov. 24 113/65  Nov. 26 97/53 9:15 am  Nov. 26 118/61 8:55 pm  Nov. 27 98/62    My last lab results: anything I need to be concerned about? We had so much to cover during my 11/18 visit, I forgot to ask about the lab results.   Thank you,  Denis Tejada  [phone]

## 2023-12-05 ENCOUNTER — OFFICE VISIT (OUTPATIENT)
Facility: CLINIC | Age: 73
End: 2023-12-05
Payer: MEDICARE

## 2023-12-05 VITALS
HEART RATE: 64 BPM | BODY MASS INDEX: 28.05 KG/M2 | RESPIRATION RATE: 16 BRPM | WEIGHT: 141 LBS | OXYGEN SATURATION: 99 % | DIASTOLIC BLOOD PRESSURE: 68 MMHG | HEIGHT: 59.45 IN | SYSTOLIC BLOOD PRESSURE: 134 MMHG

## 2023-12-05 DIAGNOSIS — J98.4 PULMONARY LESION OF RIGHT SIDE OF CHEST: Primary | ICD-10-CM

## 2023-12-05 PROCEDURE — 99214 OFFICE O/P EST MOD 30 MIN: CPT | Performed by: INTERNAL MEDICINE

## 2023-12-05 NOTE — PATIENT INSTRUCTIONS
Plan-- plan for CT in March then see me            - consider RSV vaccine as discussed           - call with any changes             -    Kyrie Rowe MD  Pulmonary Medicine  12/5/2023

## 2023-12-06 ENCOUNTER — TELEPHONE (OUTPATIENT)
Dept: INTERNAL MEDICINE CLINIC | Facility: CLINIC | Age: 73
End: 2023-12-06

## 2023-12-06 ENCOUNTER — HOSPITAL ENCOUNTER (OUTPATIENT)
Dept: MAMMOGRAPHY | Facility: HOSPITAL | Age: 73
Discharge: HOME | End: 2023-12-06
Attending: FAMILY MEDICINE
Payer: MEDICARE

## 2023-12-06 DIAGNOSIS — R92.8 ABNORMAL MAMMOGRAM: ICD-10-CM

## 2023-12-06 DIAGNOSIS — N63.0 BREAST NODULE: ICD-10-CM

## 2023-12-06 DIAGNOSIS — N63.20 MASS OF LEFT BREAST, UNSPECIFIED QUADRANT: Primary | ICD-10-CM

## 2023-12-06 PROCEDURE — 76642 ULTRASOUND BREAST LIMITED: CPT | Performed by: FAMILY MEDICINE

## 2023-12-06 NOTE — TELEPHONE ENCOUNTER
Pt stated he had her US on left breast, she had a biopsy about 3mths ago, and US done today shows a growth. Pt was advised by the radiologist to get referral for a surgeon.

## 2023-12-06 NOTE — TELEPHONE ENCOUNTER
LOV 11/18/23    Pended [place], if agreeable. Impression   CONCLUSION:  Previously biopsied mass in left breast 3 o'clock position 6 cm from nipple continues to increase in size. Surgical consultation is recommended to consider management options. BI-RADS CATEGORY:  DIAGNOSTIC CATEGORY 4a:       RECOMMENDATIONS:    SURGICAL CONSULTATION.        LOCATION:  [facility]

## 2023-12-12 ENCOUNTER — OFFICE VISIT (OUTPATIENT)
Dept: SURGERY | Facility: CLINIC | Age: 73
End: 2023-12-12
Payer: MEDICARE

## 2023-12-12 VITALS
DIASTOLIC BLOOD PRESSURE: 78 MMHG | SYSTOLIC BLOOD PRESSURE: 138 MMHG | HEART RATE: 62 BPM | HEIGHT: 59.45 IN | BODY MASS INDEX: 27.85 KG/M2 | TEMPERATURE: 98 F | OXYGEN SATURATION: 99 % | RESPIRATION RATE: 15 BRPM | WEIGHT: 140 LBS

## 2023-12-12 DIAGNOSIS — N63.20 MASS OF LEFT BREAST, UNSPECIFIED QUADRANT: Primary | ICD-10-CM

## 2023-12-12 PROCEDURE — 99205 OFFICE O/P NEW HI 60 MIN: CPT | Performed by: SURGERY

## 2023-12-12 PROCEDURE — 1125F AMNT PAIN NOTED PAIN PRSNT: CPT | Performed by: SURGERY

## 2023-12-12 NOTE — PATIENT INSTRUCTIONS
Dr. Richie Birmingham  Tel: [phone]  Fax: [address] [facility] [address] [place], [address] [facility]  [phone]     Surgery/Procedure: Left breast wire localized excisional biopsy     Anesthesia:   MAC  Surgery Length:   45 minutes CPT:  66453   Wire LOC:   Yes Nuc Med:   No   Kortney Seed:  No       Dx & ICD-10: Mass of left breast, unspecified quadrant (N63.20)   Radiology Instructions: Left breast, 3 o'clock position, pellet shaped clip, biopsy demonstrates stromal fibrosis which has increased in size.    _______________________________________________________________________________    Someone must accompany you the day of the procedure to drive you home safely, because of anesthesia. You will need an adult  to stay with you the first night following your surgery. You must remove any kind of makeup, acrylic nails, lotions, powders, creams or deodorant. [place] ONLY: Pre-admission will give instruct you on when to take Gatorade and Tylenol/acetaminophen prior to your surgery, purchase 2 - 12oz bottles of regular Gatorade (NOT RED/SUGAR FREE). Otherwise, you may not eat or drink anything else after 11PM the night before surgery. [place] ONLY: You may not eat or drink anything after midnight the day of your surgery. Wear comfortable clothing that can be easily removed. If you wear dentures, contacts lenses, or any prosthesis, you will be asked to remove them. Do not drink alcohol or smoke 24 hours prior to your procedure. Bring a picture ID and your insurance card. Covid-19 testing is no longer required before surgery unless you are experiencing symptoms such as fever, cough, congestion, etc.   The Pre-Admission Testing Department will call the day before to confirm your procedure, give you the time you need to arrive by and directions on where to go. They begin making calls after 2pm, if you are not contacted by 4pm, please call the surgeon's office listed above.   Do not take any blood thinners at least one week prior to the procedure/surgery. This includes aspirin, baby aspirin, Ibuprofen products, herbal supplements, diet medications, vitamin E, fish oil and green tea supplements. Please check other supplements for these ingredients. *TYLENOL or acetaminophen is acceptable*  If you take Coumadin, Plavix, Xarelto, or Eliquis, please contact your prescribing physician for special instructions on how long to hold. If you take insulin contact your primary care physician for special instructions. Our surgery scheduler, Eric Weber, will be contacting you to discuss surgery dates. If you have any questions related to scheduling your surgery, please reach out to her at [phone].  _____________________________________________________________________  PRE-OPERATIVE TESTING IF INDICATED BELOW  PLEASE COMPLETE ASAP (AT LEAST 14-21 DAYS PRIOR TO SURGERY)  [] CBC [x] BMP [] CMP [x] EKG    [] PT, PTT, INR [] Cardiac Clearance  [x] H&P Medical Clearance [] Chest X-ray     Please call Central Scheduling to schedule an appointment for pre-operative labs/tests @ [phone]  Does the patient have a pacemaker or ICD? Does the patient have sleep apnea?   [] Yes   [x] No                               [] Yes   [x] No

## 2023-12-13 PROBLEM — N63.20 MASS OF LEFT BREAST: Status: ACTIVE | Noted: 2023-12-13

## 2023-12-15 ENCOUNTER — TELEPHONE (OUTPATIENT)
Dept: INTERNAL MEDICINE CLINIC | Facility: CLINIC | Age: 73
End: 2023-12-15

## 2023-12-15 DIAGNOSIS — Z01.818 PREOPERATIVE EXAMINATION: Primary | ICD-10-CM

## 2023-12-15 DIAGNOSIS — R92.8 ABNORMAL MAMMOGRAM: ICD-10-CM

## 2023-12-15 DIAGNOSIS — N63.0 BREAST NODULE: ICD-10-CM

## 2023-12-15 NOTE — TELEPHONE ENCOUNTER
Please call patient. I received a pre-op request for patient but the surgery date is listed as TBD. Did she end up getting a date yet? She's going to need labs and EKG within 30 days of surgery and to see me within 2 weeks of surgery. Send back to me once you know so I can place appropriate orders.

## 2023-12-18 ENCOUNTER — TELEPHONE (OUTPATIENT)
Dept: SURGERY | Facility: HOSPITAL | Age: 73
End: 2023-12-18

## 2023-12-18 DIAGNOSIS — N63.20 MASS OF LEFT BREAST, UNSPECIFIED QUADRANT: Primary | ICD-10-CM

## 2023-12-18 NOTE — TELEPHONE ENCOUNTER
Calling pt in regards to scheduling surgery. Informed pt that I have 02/16/2024 available at [facility] with Dr. Gwen Soler. Pt verbalized understanding and in agreement with date and location. All questions answered. Encouraged pt to call or [place] message office with any other questions or concerns.

## 2023-12-28 ENCOUNTER — TELEPHONE (OUTPATIENT)
Dept: SURGERY | Facility: CLINIC | Age: 73
End: 2023-12-28

## 2023-12-28 DIAGNOSIS — N63.20 MASS OF LEFT BREAST, UNSPECIFIED QUADRANT: Primary | ICD-10-CM

## 2023-12-28 NOTE — TELEPHONE ENCOUNTER
Patient is scheduled for her surgery 4/26/24    Future Appointments   Date Time Provider Shreyas Shrestha   4/15/2024  9:30 AM Ghada Pickens,  [facility]

## 2023-12-28 NOTE — TELEPHONE ENCOUNTER
Per pt they gave her a surgery date of 2/16/2024 but she called them to get another surgery date, but hasn't heard back. 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 She will call us back once she has new date.  LAMONTE

## 2023-12-29 DIAGNOSIS — R92.8 ABNORMAL MAMMOGRAM: Primary | ICD-10-CM

## 2023-12-29 DIAGNOSIS — N63.20 MASS OF LEFT BREAST, UNSPECIFIED QUADRANT: ICD-10-CM

## 2023-12-29 NOTE — TELEPHONE ENCOUNTER
Spoke with patient; patient is aware to get the labs and EKG done about 3 weeks before her appt with [facility].

## 2024-01-15 RX ORDER — AMLODIPINE BESYLATE 5 MG/1
5 TABLET ORAL DAILY
Qty: 90 TABLET | Refills: 0 | Status: SHIPPED | OUTPATIENT
Start: 2024-01-15

## 2024-01-15 NOTE — TELEPHONE ENCOUNTER
Requested Prescriptions     Pending Prescriptions Disp Refills    AMLODIPINE 5 MG Oral Tab [Pharmacy Med Name: AMLODIPINE BESYLATE 5 MG TAB] 90 tablet 0     Sig: TAKE 1 TABLET (5 MG TOTAL) BY MOUTH DAILY.       LOV:11/18/23 [facility]    LAST CPE:11/18/23 [facility]    Last Labs:10/30/232 cmp,cbc,lipid    Last Refill:  Medication Quantity Refills Start End   amLODIPine 5 MG Oral Tab 90 tablet 0 10/9/2023 --   Sig:   Take 1 tablet (5 mg total) by mouth daily.     Route:   Oral     Note to Pharmacy:   NEED REFILL     Order #:   [id number]         Your appointments       Date & Time Appointment Department ([place])    Apr 01, 2024  8:30 AM CDT ADULT EKG with Conjunct CARD HOLTER [address] Cardio Holter and ECG ([facility])    Please wear slacks and a top for exam. Do not wear a dress.        Apr 01, 2024  9:00 AM CDT EDWCardio Lab with Conjunct CARD PHLEBOTOMY [address] Cardiodiagnostics Lab ([facility])            Apr 01, 2024 10:00 AM CDT DIAGNOSTIC MAMMOGRAM with EH SHREE [address] Mammography ([facility])    Please arrive 15 minutes prior to your appointment. If you are late to your appointment, you will be rescheduled.    If breastfeeding, pump or breastfeed one hour prior to your appointment time.    Continuous glucose monitors must be removed so the appointment should be scheduled near the normal replacement date.    If you have had a mammogram within the last 5 years at a facility other than [facility], you will need to bring those films to your appointment otherwise you will be rescheduled.     Do NOT use deodorant, talcum powder, lotions, or creams on your breasts or underarms. They leave a coating that may be picked up by the x-rays, thereby distorting the mammogram.    Wear a two piece outfit the day of the exam. This allows you to be more comfortable during the exam.    There are no eating or activity restrictions  for this exam.    The estimated duration of this exam could take up to 2 hours if an ultrasound is required. If you have questions regarding this exam, please contact a mammography technologist at [facility] at [phone] or [facility] at [phone]..      Apr 08, 2024  9:00 AM CDT Exam - Established with Aryan Rosales MD [facility] ([facility])        Apr 10, 2024  9:00 AM CDT CT CHEST with  CT MAIN [address] ([facility])    Please arrive 15 minutes prior to your scheduled appointment time.      Apr 15, 2024  9:30 AM CDT Presurgical Visit with Abena Ayala DO [address] ([facility])        Apr 19, 2024  9:00 AM CDT Exam - Established with Liv Perez MD [facility] ([facility])        Apr 26, 2024  7:40 AM CDT MAMMOGRAPHY NEEDLE LOCALIZATION with [facility] Mammography ([facility])    IMPORTANT ARRIVAL INSTRUCTIONS:     If you are having this exam the day prior to your surgery, arrive in the [facility].  If you are having this exam the same day as your surgery, arrive in the [facility].    To reach the [facility] Outpatient Registration, park or michael in the [place] Parking Garage.  Use the North Entrance located on the ground floor, veer left past the Information Desk, and proceed to the [facility] Registration Desk.    To reach the [facility] Outpatient Registration, park in the [facility] Parking Garage and enter the double doors located on the ground floor.  Proceed to the [facility] past the Information Desk to Outpatient Registration.      Please present to outpatient registration 30 mintues prior to appointment time.    Prior to your arrival:    A nurse will be calling you to do a brief Health History Screen over  the phone and give you any further instructions if needed. This nurse will also be able to assist you should any additional testing be required before your procedure.       May 02, 2024  9:00 AM CDT Post Op Visit with Kassie Oliver APRN [facility] ([facility] [place])        May 07, 2024  8:45 AM CDT Post Op Visit with Sylvia Joshi MD [facility] ([facility] [place])              [facility] Cardio Holter and ECG  [facility]  [address] [facility] [address]  [phone] [facility] Cardiodiagnostics Lab  [facility]  [address] [facility] [address]  [phone] [facility] [address]  [facility]  [address] [facility] [address]  [phone]    [facility] Mammography  [facility]  [address]  [facility] [id number] [phone] [facility]  [facility] [place]  [address]  [facility] [address] [phone] [facility], [address] [place] [address]  [facility] IM/[facility] [place]  [address]  [facility] [address] [phone]    [facility] [address]  [address]  [facility] [address] [phone] [facility], [address] [facility] [address]  [address]  [facility] [address] [phone]

## 2024-03-18 ENCOUNTER — TELEPHONE (OUTPATIENT)
Dept: MAMMOGRAPHY | Facility: HOSPITAL | Age: 74
End: 2024-03-18

## 2024-03-18 NOTE — TELEPHONE ENCOUNTER
Attempted to reach patient regarding localization procedure education. Message left to call back.

## 2024-03-18 NOTE — TELEPHONE ENCOUNTER
Spoke to Silvia Rodríguez regarding needle localization process of breast for excisional biopsy scheduled for 4-26-24 with Dr. Joshi. Procedure explained and all questions answered. Pt to be transported via W/C through [employer] to [facility] in [place]. Pt verbalized understanding and had no further questions at this time.

## 2024-04-01 ENCOUNTER — HOSPITAL ENCOUNTER (OUTPATIENT)
Dept: MAMMOGRAPHY | Facility: HOSPITAL | Age: 74
Discharge: HOME | End: 2024-04-01
Attending: SURGERY
Payer: MEDICARE

## 2024-04-01 ENCOUNTER — HOSPITAL ENCOUNTER (OUTPATIENT)
Dept: LAB | Facility: HOSPITAL | Age: 74
Discharge: HOME | End: 2024-04-01
Attending: FAMILY MEDICINE
Payer: MEDICARE

## 2024-04-01 ENCOUNTER — HOSPITAL ENCOUNTER (OUTPATIENT)
Dept: CV DIAGNOSTICS | Facility: HOSPITAL | Age: 74
Discharge: HOME | End: 2024-04-01
Attending: FAMILY MEDICINE
Payer: MEDICARE

## 2024-04-01 DIAGNOSIS — Z01.818 PREOPERATIVE EXAMINATION: ICD-10-CM

## 2024-04-01 DIAGNOSIS — R92.8 ABNORMAL MAMMOGRAM: ICD-10-CM

## 2024-04-01 DIAGNOSIS — N63.20 MASS OF LEFT BREAST, UNSPECIFIED QUADRANT: ICD-10-CM

## 2024-04-01 DIAGNOSIS — N63.0 BREAST NODULE: ICD-10-CM

## 2024-04-01 LAB
ANION GAP SERPL CALC-SCNC: 5 MMOL/L (ref 0–18)
ATRIAL RATE: 74 BPM
BUN BLD-MCNC: 16 MG/DL (ref 9–23)
CALCIUM BLD-MCNC: 9.2 MG/DL (ref 8.5–10.1)
CHLORIDE SERPL-SCNC: 107 MMOL/L (ref 98–112)
CO2 SERPL-SCNC: 29 MMOL/L (ref 21–32)
CREAT BLD-MCNC: 0.9 MG/DL
EGFRCR SERPLBLD CKD-EPI 2021: 68 ML/MIN/1.73M2 (ref 60–?)
FASTING STATUS PATIENT QL REPORTED: YES
GLUCOSE BLD-MCNC: 91 MG/DL (ref 70–99)
OSMOLALITY SERPL CALC.SUM OF ELEC: 293 MOSM/KG (ref 275–295)
P AXIS: 44 DEGREES
P-R INTERVAL: 160 MS
POTASSIUM SERPL-SCNC: 3.8 MMOL/L (ref 3.5–5.1)
Q-T INTERVAL: 412 MS
QRS DURATION: 108 MS
QTC CALCULATION (BEZET): 457 MS
R AXIS: -30 DEGREES
SODIUM SERPL-SCNC: 141 MMOL/L (ref 136–145)
T AXIS: 50 DEGREES
VENTRICULAR RATE: 74 BPM

## 2024-04-01 PROCEDURE — 93005 ELECTROCARDIOGRAM TRACING: CPT

## 2024-04-01 PROCEDURE — 77062 BREAST TOMOSYNTHESIS BI: CPT | Performed by: SURGERY

## 2024-04-01 PROCEDURE — 77066 DX MAMMO INCL CAD BI: CPT | Performed by: SURGERY

## 2024-04-01 PROCEDURE — 36415 COLL VENOUS BLD VENIPUNCTURE: CPT

## 2024-04-01 PROCEDURE — 80048 BASIC METABOLIC PNL TOTAL CA: CPT

## 2024-04-01 PROCEDURE — 93010 ELECTROCARDIOGRAM REPORT: CPT | Performed by: INTERNAL MEDICINE

## 2024-04-01 PROCEDURE — 76642 ULTRASOUND BREAST LIMITED: CPT | Performed by: SURGERY

## 2024-04-08 ENCOUNTER — TELEPHONE (OUTPATIENT)
Dept: INTERNAL MEDICINE CLINIC | Facility: CLINIC | Age: 74
End: 2024-04-08

## 2024-04-08 ENCOUNTER — OFFICE VISIT (OUTPATIENT)
Dept: INTERNAL MEDICINE CLINIC | Facility: CLINIC | Age: 74
End: 2024-04-08
Payer: MEDICARE

## 2024-04-08 ENCOUNTER — OFFICE VISIT (OUTPATIENT)
Facility: LOCATION | Age: 74
End: 2024-04-08
Payer: MEDICARE

## 2024-04-08 VITALS — TEMPERATURE: 98 F | HEART RATE: 80 BPM

## 2024-04-08 VITALS
HEART RATE: 65 BPM | BODY MASS INDEX: 27.09 KG/M2 | WEIGHT: 138 LBS | DIASTOLIC BLOOD PRESSURE: 76 MMHG | HEIGHT: 60 IN | OXYGEN SATURATION: 98 % | SYSTOLIC BLOOD PRESSURE: 132 MMHG

## 2024-04-08 DIAGNOSIS — K62.89 RECTAL MASS: Primary | ICD-10-CM

## 2024-04-08 DIAGNOSIS — R93.3 ABNORMAL FINDINGS ON DIAGNOSTIC IMAGING OF DIGESTIVE SYSTEM: ICD-10-CM

## 2024-04-08 DIAGNOSIS — R68.84 JAW PAIN: Primary | ICD-10-CM

## 2024-04-08 PROCEDURE — 99213 OFFICE O/P EST LOW 20 MIN: CPT | Performed by: STUDENT IN AN ORGANIZED HEALTH CARE EDUCATION/TRAINING PROGRAM

## 2024-04-08 PROCEDURE — 46600 DIAGNOSTIC ANOSCOPY SPX: CPT | Performed by: STUDENT IN AN ORGANIZED HEALTH CARE EDUCATION/TRAINING PROGRAM

## 2024-04-08 PROCEDURE — 99214 OFFICE O/P EST MOD 30 MIN: CPT | Performed by: FAMILY MEDICINE

## 2024-04-08 RX ORDER — AMLODIPINE BESYLATE 5 MG/1
5 TABLET ORAL DAILY
Qty: 90 TABLET | Refills: 1 | Status: SHIPPED | OUTPATIENT
Start: 2024-04-08

## 2024-04-08 RX ORDER — HYDROCHLOROTHIAZIDE 12.5 MG/1
12.5 TABLET ORAL DAILY
Qty: 90 TABLET | Refills: 0 | Status: SHIPPED | OUTPATIENT
Start: 2024-04-08 | End: 2024-04-08

## 2024-04-08 NOTE — PROGRESS NOTES
Follow Up Visit Note       Active Problems      1. Rectal mass    2. Abnormal findings on diagnostic imaging of digestive system          Chief Complaint   Chief Complaint   Patient presents with    [facility]     EST- 6 MONTH FOLLOW UP COLONOSCOPY DONE 9/19/23 - 2 POLYPS, NO SYMPTOMS.           History of Present Illness  This is a very nice 74-year-old female who was referred to me for further evaluation of an abnormal PET scan.  Patient underwent a CT/PET scan on 9/11/2023 ordered by her pulmonologist, Dr. Perez, to further evaluate a pulmonary nodule.  This showed moderate to marked focal activity associated with the anal sphincter complex.  Patient then followed up with her gastroenterologist, Dr. Khoa Winkler, and underwent a colonoscopy on 9/19/2023.  This revealed two 1 cm polyps, diverticulosis, grade 2 internal hemorrhoids and prominent polypoid external anal skin tags.  Dr. Winkler notes that the anal canal was extensively examined given positive PET with no overt lesions seen.  Dr. Winkler recommended follow-up with colorectal surgery to evaluate the anal skin tags.     Patient has noted anal skin tags for the last 50 years.  They do not cause her any symptoms.  Specifically, she denies any pain, bleeding, prolapse, seepage, itching or incontinence.  They do not interfere with hygiene.  No prior anorectal surgery.  She recalls having an abnormal Pap smear around the year 2000.  She denies any history of genital warts or sexually transmitted infection.    Patient returns for 6-month follow-up today.  She has no anorectal symptoms.      Allergies  Silvia is allergic to aspirin, hydrocodone, and nsaids.    Past Medical / Surgical / Social / Family History    The past medical and past surgical history have been reviewed by me today.    Past Medical History:   Diagnosis Date    Abdominal distention 12/30/2019    Abdominal hernia     Abdominal pain 12/30/2019, 12/27/20    Also 8/27/2021    Allergic  rhinitis     Arthritis     Fingers    Belching Constant    Bloating     Body piercing Ears    Constipation 2021    Decorative tattoo One on buttock    Easy bruising     Essential hypertension     Flatulence/gas pain/belching 2019    Food intolerance     Milk, ice cream    Headache disorder     Several times per week    Heart palpitations     Occasionally    Hemorrhoids     High blood pressure     History of stomach ulcers     Irregular bowel habits 2021    Nausea 2021    Presence of other cardiac implants and grafts     [place] in right toe    Sleep disturbance [place]    Uncomfortable fullness after meals 2021    Visual impairment     glaases    Wears glasses     Glasses     Past Surgical History:   Procedure Laterality Date    ARTHROSCOPY OF JOINT UNLISTED Right     knee    COLONOSCOPY  2015    normal; repeat 10 yrs    COLONOSCOPY      Every 10 years    COLONOSCOPY,DIAGNOSTIC N/A 2015    Procedure: COLONOSCOPY, POSSIBLE BIOPSY, POSSIBLE POLYPECTOMY 63398;  Surgeon: Kevin Vivas MD;  [facility]    D & C  1971    NEEDLE BIOPSY LEFT  2023    Stromal fibrosis      Aug. 1972    OTHER SURGICAL HISTORY      bilateral feet sx-local    TUBAL LIGATION         The family history and social history have been reviewed by me today.    Family History   Problem Relation Age of Onset    Cancer Mother         Breast cancer    Breast Cancer Mother 75        estimated age    Cancer Father         Lung cancer    Dementia Sister         Alzheimers    Diabetes Sister         Alzheimers    Diabetes Brother     Other (vagina or uterine cancer) Maternal Grandmother     Ear Problems Neg     Allergies Neg     Bleeding Disorders Neg     Clotting Disorder Neg     Thyroid disease Neg     Hypertension Neg     Asthma Neg     Arthritis Neg     Anesthesia Problems  Neg      Social History     Socioeconomic History    Marital status: Life Partner   Tobacco Use     Smoking status: Never     Passive exposure: Past (both parents smoked in home when patient was a child)    Smokeless tobacco: Never    Tobacco comments:     Job:  Works at [employer]   Vaping Use    Vaping Use: Never used   Substance and Sexual Activity    Alcohol use: No    Drug use: Never        Current Outpatient Medications:     amLODIPine 5 MG Oral Tab, Take 1 tablet (5 mg total) by mouth daily., Disp: 90 tablet, Rfl: 0     Review of Systems  A 10 point review of systems was performed and negative unless otherwise documented per HPI.     Physical Findings   Pulse 80   Temp 98.4 °F (36.9 °C)   Physical Exam  Vitals and nursing note reviewed. Exam conducted with a chaperone present.   Constitutional:       General: She is not in acute distress.  HENT:      Head: Normocephalic and atraumatic.      Mouth/Throat:      Mouth: Mucous membranes are moist.   Cardiovascular:      Rate and Rhythm: Normal rate and regular rhythm.   Pulmonary:      Effort: Pulmonary effort is normal.   Abdominal:      General: There is no distension.      Palpations: Abdomen is soft.      Tenderness: There is no abdominal tenderness.   Genitourinary:     Comments: Patient examined in the prone jackknife position with female nurse chaperone present.  External exam of the anus does reveal prominent anal skin tags in the anterior midline, right posterior and left posterior positions.  The skin tags are all soft, mobile and fleshy appearing.  There is no firmness, metaplasia or polypoid tissue seen.  Digital rectal exam reveals fair tone without any masses, bleeding or drainage.  Anoscopy reveals grade 2-3 right anterior and right posterior internal hemorrhoids.  The anorectal mucosa appears pink and healthy without any masses or lesions.  Examination was somewhat limited by stool.  Musculoskeletal:         General: No deformity.   Skin:     General: Skin is warm and dry.   Neurological:      General: No focal deficit present.       Mental Status: She is alert.   Psychiatric:         Mood and Affect: Mood normal.          Assessment   1. Rectal mass    2. Abnormal findings on diagnostic imaging of digestive system      This is a very nice 74-year-old female who was referred to me for further evaluation of an abnormal PET scan.  Patient underwent a CT/PET scan on 9/11/2023 ordered by her pulmonologist, Dr. Perez, to further evaluate a pulmonary nodule.  This showed moderate to marked focal activity associated with the anal sphincter complex.  Patient then followed up with her gastroenterologist, Dr. Khoa Winkler, and underwent a colonoscopy on 9/19/2023.  This revealed two 1 cm polyps, diverticulosis, grade 2 internal hemorrhoids and prominent polypoid external anal skin tags.  Dr. Winkler notes that the anal canal was extensively examined given positive PET with no overt lesions seen.  Dr. Winkler recommended follow-up with colorectal surgery to evaluate the anal skin tags.     Patient has noted anal skin tags for the last 50 years.  They do not cause her any symptoms.  Specifically, she denies any pain, bleeding, prolapse, seepage, itching or incontinence.  They do not interfere with hygiene.  No prior anorectal surgery.  She recalls having an abnormal Pap smear around the year 2000.  She denies any history of genital warts or sexually transmitted infection.    Patient returns for 6-month follow-up today.  She has no anorectal symptoms.     External exam of the anus does reveal prominent anal skin tags in the anterior midline, right posterior and left posterior positions.  The skin tags are all soft, mobile and fleshy appearing.  There is no firmness, metaplasia or polypoid tissue seen.  Digital rectal exam reveals fair tone without any masses, bleeding or drainage.  Anoscopy reveals grade 2-3 right anterior and right posterior internal hemorrhoids.  The anorectal mucosa appears pink and healthy without any masses or lesions.  Examination  was somewhat limited by stool.    Overall, there are no abnormal findings appreciated on bedside exam with anoscopy to explain the abnormal PET/CT findings.  The anal skin tags and hemorrhoids are completely asymptomatic.     Plan   I recommend MRI pelvis rectal cancer protocol to rule out any occult masses within the anal canal or sphincter complex.  If MRI pelvis is negative, patient can follow-up with me as needed.  If MRI has any questionable lesions, will need to have further discussion for anal exam under anesthesia with anoscopy and possible biopsies.    I recommend ongoing follow-up with Dr. Winkler for surveillance colonoscopies.  I do not recommend planning for excision of the anal skin tags or hemorrhoidectomy at this point as they are completely asymptomatic and have a completely benign clinical appearance.  Patient expressed understanding and was agreeable to plan.     No orders of the defined types were placed in this encounter.      Imaging & Referrals   MRI 3T RECTAL CANCER (W+WO) (CPT=72197)    Follow Up  No follow-ups on file.    Aryan Rosales MD

## 2024-04-08 NOTE — TELEPHONE ENCOUNTER
Requested Prescriptions     Pending Prescriptions Disp Refills    HYDROCHLOROTHIAZIDE 12.5 MG Oral Tab [Pharmacy Med Name: HYDROCHLOROTHIAZIDE 12.5 MG TB] 90 tablet 0     Sig: TAKE 1 TABLET BY MOUTH EVERY DAY       LOV:11/18/23 [facility]    LAST CPE:11/18/23 [facility]    Last Labs:10/30/23 LIPID,CBC,CMP    Last Refill:  Medication Quantity Refills Start End   HYDROCHLOROTHIAZIDE 12.5 MG Oral Tab (Discontinued) 90 tablet 0 5/18/2023 7/26/2023   Sig:   TAKE 1 TABLET BY MOUTH EVERY DAY     Route:   (none)     Order #:   [id number]

## 2024-04-08 NOTE — TELEPHONE ENCOUNTER
Pt called back to let [facility] know she called medicare and they need to know the procedure code and diagnosis code in order to tell her if they would cover it or not-not sure we would know that-does [facility] have the name of someone to refer her to that she can maybe call them and get an idea of the codes?

## 2024-04-08 NOTE — PROCEDURES
Procedure: Anoscopy    Surgeon: Aryan Rosales    Anesthesia: None    Findings: See the progress note attached for all findings    Operative Summary: The patient was placed in a prone position on the proctoscopy table, the hips were flexed in the jackknife position.    Using a well-lubricated finger, digital rectal exam was performed in anticipation of the anoscope.    The anoscope was then inserted under direct visualization.    Excellent visualization was performed in a 360° fashion.    The scope was removed. The patient was taken out of the prone, jackknife, position.     The patient tolerated the procedure well.

## 2024-04-08 NOTE — PROGRESS NOTES
Subjective:   Patient ID: Silvia Rodríguez is a 74 year old female.    HPI 6 weeks of right sided jaw pain. Achy, \"feels like a tooth ache\". Has been seeing dentist and endodontist and unable to figure out a cause. Taking tylenol and this helps. No pain with chewing. No pain with talking.     History/Other:   Review of Systems   Constitutional:  Negative for chills and fever.   Neurological:  Negative for weakness and numbness.     Current Outpatient Medications   Medication Sig Dispense Refill    amLODIPine 5 MG Oral Tab Take 1 tablet (5 mg total) by mouth daily. 90 tablet 1     Allergies:  Allergies   Allergen Reactions    Aspirin HIVES    Hydrocodone UNKNOWN    Nsaids HIVES       Objective:   Physical Exam  Vitals reviewed.   Constitutional:       Appearance: Normal appearance. She is well-developed.   HENT:      Head: Normocephalic and atraumatic.      Jaw: No tenderness, pain on movement or malocclusion.      Right Ear: Tympanic membrane and ear canal normal.      Mouth/Throat:      Mouth: Mucous membranes are moist.      Pharynx: Oropharynx is clear.   Pulmonary:      Effort: Pulmonary effort is normal.   Neurological:      Mental Status: She is alert.   Psychiatric:         Mood and Affect: Mood normal.         Behavior: Behavior normal.         Assessment & Plan:   1. Jaw pain    Unclear cause. Sounds dental. Patient to see if christel-maxilofacial surgeon would be covered under medical.     No orders of the defined types were placed in this encounter.      Meds This Visit:  Requested Prescriptions     Signed Prescriptions Disp Refills    amLODIPine 5 MG Oral Tab 90 tablet 1     Sig: Take 1 tablet (5 mg total) by mouth daily.       Imaging & Referrals:  None

## 2024-04-09 NOTE — TELEPHONE ENCOUNTER
Pended referral. She can try this oral surgeon. ICD-10 is: R68.84. There is no procedure code at this point since she is just going to see oral surgeon.

## 2024-04-09 NOTE — TELEPHONE ENCOUNTER
Patient notified AMS recommending Dr abida Bonilla Oral Surgeon for jaw pain, info given.  Pt verbalizes understanding.

## 2024-04-10 ENCOUNTER — HOSPITAL ENCOUNTER (OUTPATIENT)
Dept: CT IMAGING | Facility: HOSPITAL | Age: 74
Discharge: HOME | End: 2024-04-10
Attending: INTERNAL MEDICINE
Payer: MEDICARE

## 2024-04-10 DIAGNOSIS — J98.4 PULMONARY LESION OF RIGHT SIDE OF CHEST: ICD-10-CM

## 2024-04-10 PROCEDURE — 71250 CT THORAX DX C-: CPT | Performed by: INTERNAL MEDICINE

## 2024-04-15 ENCOUNTER — TELEPHONE (OUTPATIENT)
Dept: INTERNAL MEDICINE CLINIC | Facility: CLINIC | Age: 74
End: 2024-04-15

## 2024-04-15 ENCOUNTER — OFFICE VISIT (OUTPATIENT)
Facility: CLINIC | Age: 74
End: 2024-04-15
Payer: MEDICARE

## 2024-04-15 ENCOUNTER — OFFICE VISIT (OUTPATIENT)
Dept: INTERNAL MEDICINE CLINIC | Facility: CLINIC | Age: 74
End: 2024-04-15
Payer: MEDICARE

## 2024-04-15 VITALS
DIASTOLIC BLOOD PRESSURE: 68 MMHG | BODY MASS INDEX: 26.93 KG/M2 | WEIGHT: 137.19 LBS | SYSTOLIC BLOOD PRESSURE: 130 MMHG | OXYGEN SATURATION: 97 % | HEIGHT: 60 IN | HEART RATE: 73 BPM

## 2024-04-15 VITALS
BODY MASS INDEX: 27 KG/M2 | OXYGEN SATURATION: 99 % | SYSTOLIC BLOOD PRESSURE: 132 MMHG | WEIGHT: 137.63 LBS | DIASTOLIC BLOOD PRESSURE: 80 MMHG | HEART RATE: 73 BPM

## 2024-04-15 DIAGNOSIS — R68.84 JAW PAIN: Primary | ICD-10-CM

## 2024-04-15 DIAGNOSIS — N63.20 MASS OF LEFT BREAST, UNSPECIFIED QUADRANT: ICD-10-CM

## 2024-04-15 DIAGNOSIS — R91.1 PULMONARY NODULE 1 CM OR GREATER IN DIAMETER: ICD-10-CM

## 2024-04-15 DIAGNOSIS — Z01.818 PREOPERATIVE EXAMINATION: Primary | ICD-10-CM

## 2024-04-15 DIAGNOSIS — I31.39 PERICARDIAL EFFUSION (HCC): Primary | ICD-10-CM

## 2024-04-15 PROCEDURE — 99214 OFFICE O/P EST MOD 30 MIN: CPT | Performed by: FAMILY MEDICINE

## 2024-04-15 PROCEDURE — 99214 OFFICE O/P EST MOD 30 MIN: CPT | Performed by: INTERNAL MEDICINE

## 2024-04-15 RX ORDER — TRAMADOL HYDROCHLORIDE 50 MG/1
50 TABLET ORAL 3 TIMES DAILY
COMMUNITY
Start: 2024-04-10 | End: 2024-04-15

## 2024-04-15 NOTE — PATIENT INSTRUCTIONS
Plan-- plan for CT October then see me           - to get blood work -- ill call to see if we can add it on           - to get ECHO heart           = call with any changes             -    Liv Perez MD  Pulmonary Medicine  4/15/2024

## 2024-04-15 NOTE — PROGRESS NOTES
Pulmonary Consult Note    History of Present Illness:  Silvia Rodríguez is a 74 year old female presenting to pulmonary clinic today for pulmonary nodule  Had scans for 2 yrs - for GI issues - ulcers and herna - found to have nodules - - 6 month follow up - with sl increase   Tried to be kidney donor- rejected with less kidney function-   Never smoked - secondhand - growing up- dad  from lung cancer   No hx asthma or COPD_ -   Cathi-   Asa and NSAID hives- as adult age 20s - no asthma   No illness or hx of sickness as a kid   No regular exercise     - following with surgeon- revisit in April-   No issues -   Saw GI-- with polyps removed adenomas  No issues - feels well- saw dr king without change   Feels fine-   No coughing - no shortness of breath    Off hydrochlorothiazide now   Out of breath with the stairs - since started hydrochlorothiazide so now off and better - no dyspnea now at all     - saw GI-- found large polyps - not the cause -   Saw dr espinoza- and had 6 month followup- - for MRI--  -no issues   Feels good- - partner - had transplant -- then needed stents -   No dyspnea at all - 2-3 flights of stairs feels sl winded- at most   No coughing -- no mucus   No cp   Denies recent viral infection                Past Medical History:   Past Medical History:    Abdominal distention    Abdominal hernia    Abdominal pain    Also 2021    Allergic rhinitis    Arthritis    Fingers    Belching    Bloating    Body piercing    Constipation    Decorative tattoo    Easy bruising    Essential hypertension    Flatulence/gas pain/belching    Food intolerance    Milk, ice cream    Headache disorder    Several times per week    Heart palpitations    Occasionally    Hemorrhoids    High blood pressure    History of stomach ulcers    Irregular bowel habits    Nausea    Presence of other cardiac implants and grafts    [place] in right toe    Sleep disturbance    Uncomfortable fullness after meals    Visual  impairment    glaases    Wears glasses    Glasses    Recent HTN- - when renal donor eval  Hx ulcers 2 yrs ago -- HH - - now off PPI -  No reflux or heartburn-   No cancers   No clots -   No AFSANEH-     Past Surgical History:   Past Surgical History:   Procedure Laterality Date    Arthroscopy of joint unlisted Right     knee    Colonoscopy  2015    normal; repeat 10 yrs    Colonoscopy      Every 10 years    Colonoscopy,diagnostic N/A 2015    Procedure: COLONOSCOPY, POSSIBLE BIOPSY, POSSIBLE POLYPECTOMY 04694;  Surgeon: Kevin Vivas MD;  [facility]    D & c  [facility], [facility]    Needle biopsy left  2023    Stromal fibrosis      Aug. 1972    Other surgical history      bilateral feet sx-local    Tubal ligation         Family Medical History:   Family History   Problem Relation Age of Onset    Cancer Mother         Breast cancer    Breast Cancer Mother 75        estimated age    Cancer Father         Lung cancer    Dementia Sister         Alzheimers    Diabetes Sister         Alzheimers    Diabetes Brother     Other (vagina or uterine cancer) Maternal Grandmother     Ear Problems Neg     Allergies Neg     Bleeding Disorders Neg     Clotting Disorder Neg     Thyroid disease Neg     Hypertension Neg     Asthma Neg     Arthritis Neg     Anesthesia Problems  Neg        Social History: Social History    Socioeconomic History      Marital status: Life Partner    Tobacco Use      Smoking status: Never      Smokeless tobacco: Never      Tobacco comments: Job:  Works at [employer]    Vaping Use      Vaping Use: Never used    Substance and Sexual Activity      Alcohol use: No      Drug use: Never  Retired- realtor and    [facility] -   No pets       Allergies: Aspirin, Hydrocodone, and Nsaids     Medications:   Current Outpatient Medications   Medication Sig Dispense Refill    amLODIPine 5 MG Oral Tab Take 1 tablet (5 mg total) by mouth daily. 90 tablet 1        Review of Systems: weight - stable - sl up   No swallowing issues at all   No sore throat   No skin lesions or hives or rashes --  Had breast bx- on Friday - first bx- - was neg - for US in am - neg bx-- now growing-- for removal -- 4/26 -- grizek   No allergies or sinus issues   No gerd - some burping - ulcer and HH - still with burping - no ppi -   Sleeping - mind races better now- 4 hours - nocturia -not a great sleeper -   Pain in rt jaw--   Hard to get to sleep - melatonin -     Physical Exam:  /80 (BP Location: Left arm, Patient Position: Sitting, Cuff Size: adult)   Pulse 73   Wt 137 lb 9.6 oz (62.4 kg)   SpO2 99%   BMI 26.87 kg/m²    Constitutional: comfortable . No acute distress.   HEENT: Head NC/AT. PEERL. Throat is clear no lesions   Cardio: Regular rate and rhythm. Normal S1 and S2. No murmurs. No ectopy no rub  Respiratory: Thorax symmetrical with no labored breathing. . No wheezing, rhonchi, rales, or crackles.   GI:  Abd soft, non-tender.  Extremities: No clubbing or cyanosis. No LE edema. No calf tenderness.VV  Neurologic: A&Ox3. No gross motor deficits.  Skin: Warm, dry.no rashes or hives noted   Lymphatic: No cervical or supraclavicular lymphadenopathy.no jvd   Psych: Calm, cooperative. Pleasant affect.    Results:  Reviewed     Assessment/Plan:  # pulmonary nodule   Right lower lobe-- endobronchial-  Reviewed with radiology-increased in size and was present April 2022 and February 23--plan to proceed with PET scan  12/23 PET scan negative for uptake with the exception of the anal sphincter,  12.23 for repeat at 6 months   4/24- mucus plug now smaller -- for follow up -at 6 months   No evidence of broncholith noted      # Pet -Positive anal lesions  Saw GI and is following with surgery-polyps grade 2 internal hemorrhoids and external anal skin tags  4/24- multip[le evals with polyps found more proximally- not explaining with plans for MRI         #History of ulcers and hiatal  hernia  Denies any signs or symptoms of aspiration/recurrent GERD  Hx bacterial with breath - treated - recently off bactrim - tried xifaxan - too expensive - then bactrim-- no ppi -initiated for bad breath SOM      #Borderline hypertension  Recent treatment-initially for hopes for kidney donor  So the PFTs in my opinion,    # pericardial effusion - sl increase on ct   To check echo - no sx       Plan-- plan for CT October then see me           - to get blood work -- ill call to see if we can add it on           - to get ECHO heart           = call with any changes             -    Liv Perez MD  Pulmonary Medicine  4/15/2024

## 2024-04-15 NOTE — TELEPHONE ENCOUNTER
Please fax my H&P, labs, EKGs (all of which are attached to pre-op paperwork in my folder) to appropriate location. Surgery is 4/26/24.

## 2024-04-15 NOTE — PROGRESS NOTES
Subjective:   Patient ID: Silvia Rodríguez is a 74 year old female.    HPI Here for preoperative exam for planned left breast wire localized excisional biopsy scheduled for 24 with Dr. Sylvia Joshi. Patient has tolerated anesthesia in the past with no complications. Is physically active and has no chest pain/shortness of breath with exercise.     History/Other:   Past Medical History:    Abdominal distention    Abdominal hernia    Abdominal pain    Also 2021    Allergic rhinitis    Arthritis    Fingers    Belching    Bloating    Body piercing    Constipation    Decorative tattoo    Easy bruising    Essential hypertension    Flatulence/gas pain/belching    Food intolerance    Milk, ice cream    Headache disorder    Several times per week    Heart palpitations    Occasionally    Hemorrhoids    High blood pressure    History of stomach ulcers    Irregular bowel habits    Nausea    Presence of other cardiac implants and grafts    [place] in right toe    Sleep disturbance    Uncomfortable fullness after meals    Visual impairment    glaases    Wears glasses    Glasses     Past Surgical History:   Procedure Laterality Date    Arthroscopy of joint unlisted Right     knee    Colonoscopy  2015    normal; repeat 10 yrs    Colonoscopy      Every 10 years    Colonoscopy,diagnostic N/A 2015    Procedure: COLONOSCOPY, POSSIBLE BIOPSY, POSSIBLE POLYPECTOMY 88860;  Surgeon: Kevin Vivas MD;  [facility]    D & c  [facility]    Needle biopsy left  2023    Stromal fibrosis      Aug. 1972    Other surgical history      bilateral feet sx-local    Tubal ligation       Social History     Socioeconomic History    Marital status: Life Partner   Tobacco Use    Smoking status: Never     Passive exposure: Past (both parents smoked in home when patient was a child)    Smokeless tobacco: Never    Tobacco comments:     Job:  Works at [employer]   Vaping Use    Vaping status: Never Used    Substance and Sexual Activity    Alcohol use: No    Drug use: Never   Other Topics Concern    Caffeine Concern No    Exercise No    Seat Belt No    Special Diet No    Stress Concern No    Weight Concern No     Family History   Problem Relation Age of Onset    Cancer Mother         Breast cancer    Breast Cancer Mother 75        estimated age    Cancer Father         Lung cancer    Dementia Sister         Alzheimers    Diabetes Sister         Alzheimers    Diabetes Brother     Other (vagina or uterine cancer) Maternal Grandmother     Ear Problems Neg     Allergies Neg     Bleeding Disorders Neg     Clotting Disorder Neg     Thyroid disease Neg     Hypertension Neg     Asthma Neg     Arthritis Neg     Anesthesia Problems  Neg        Review of Systems   Constitutional:  Negative for activity change, appetite change, fatigue and fever.   HENT:  Negative for ear pain, hearing loss and rhinorrhea.    Eyes:  Negative for visual disturbance.   Respiratory:  Negative for cough and shortness of breath.    Cardiovascular:  Negative for chest pain, palpitations and leg swelling.   Gastrointestinal:  Negative for abdominal pain and constipation.   Endocrine: Negative for polydipsia, polyphagia and polyuria.   Genitourinary:  Negative for dysuria and frequency.   Musculoskeletal:  Negative for arthralgias and joint swelling.   Skin:  Negative for rash.   Neurological:  Negative for dizziness, weakness, numbness and headaches.   Hematological:  Negative for adenopathy. Does not bruise/bleed easily.   Psychiatric/Behavioral:  Negative for dysphoric mood. The patient is not nervous/anxious.      Current Outpatient Medications   Medication Sig Dispense Refill    amLODIPine 5 MG Oral Tab Take 1 tablet (5 mg total) by mouth daily. 90 tablet 1     Allergies:  Allergies   Allergen Reactions    Aspirin HIVES    Hydrocodone UNKNOWN    Nsaids HIVES       Objective:   Physical Exam  Vitals reviewed.   Constitutional:       Appearance:  Normal appearance. She is well-developed.   HENT:      Head: Normocephalic and atraumatic.      Right Ear: Tympanic membrane, ear canal and external ear normal.      Left Ear: Tympanic membrane, ear canal and external ear normal.      Mouth/Throat:      Pharynx: No posterior oropharyngeal erythema.   Eyes:      Conjunctiva/sclera: Conjunctivae normal.      Pupils: Pupils are equal, round, and reactive to light.   Cardiovascular:      Rate and Rhythm: Normal rate and regular rhythm.      Heart sounds: Normal heart sounds.   Pulmonary:      Effort: Pulmonary effort is normal.      Breath sounds: Normal breath sounds.   Skin:     General: Skin is warm and dry.   Neurological:      Mental Status: She is alert.   Psychiatric:         Behavior: Behavior normal.         Assessment & Plan:   1. Preoperative examination    2. Mass of left breast, unspecified quadrant    Reviewed BMP and EKG. Proceed as planned. Patient is at acceptable risk for surgery.     No orders of the defined types were placed in this encounter.      Meds This Visit:  Requested Prescriptions      No prescriptions requested or ordered in this encounter       Imaging & Referrals:  None

## 2024-04-15 NOTE — TELEPHONE ENCOUNTER
Faxed results as requested from below to Pre-Admission Testing dept. Placed original forms back in AMS pre-op folder.

## 2024-04-17 ENCOUNTER — LAB ENCOUNTER (OUTPATIENT)
Dept: LAB | Age: 74
End: 2024-04-17
Attending: INTERNAL MEDICINE
Payer: MEDICARE

## 2024-04-17 ENCOUNTER — ANESTHESIA EVENT (OUTPATIENT)
Dept: SURGERY | Facility: HOSPITAL | Age: 74
End: 2024-04-17
Payer: MEDICARE

## 2024-04-17 DIAGNOSIS — I31.39 PERICARDIAL EFFUSION (HCC): ICD-10-CM

## 2024-04-17 LAB — TSI SER-ACNC: 0.95 MIU/ML (ref 0.36–3.74)

## 2024-04-17 PROCEDURE — 84443 ASSAY THYROID STIM HORMONE: CPT

## 2024-04-17 PROCEDURE — 36415 COLL VENOUS BLD VENIPUNCTURE: CPT

## 2024-04-19 ENCOUNTER — TELEPHONE (OUTPATIENT)
Facility: LOCATION | Age: 74
End: 2024-04-19

## 2024-04-19 NOTE — TELEPHONE ENCOUNTER
Spoke with patient, and relayed that one dose of alprazolam was sent to preferred [facility], and to take 30-45 mins prior to imaging appt.  She verbalized understanding, is agreeable, and had no further questions or concerns.

## 2024-04-19 NOTE — TELEPHONE ENCOUNTER
Good morning, the patient recently called stating that they have a upcoming MRI scheduled and would like  prescribed them some to ease them through the process because they are anxious about having the MRI done because of the tight space.    MRI 3T RECTAL CANCER     Call back # [phone]

## 2024-04-25 NOTE — ANESTHESIA PREPROCEDURE EVALUATION
PRE-OP EVALUATION    Patient Name: Silvia Rodríguez    Admit Diagnosis: Mass of left breast, unspecified quadrant [N63.20]    Pre-op Diagnosis: Mass of left breast, unspecified quadrant [N63.20]    Left breast wire localized excisional biopsy    Anesthesia Procedure: Left breast wire localized excisional biopsy (Left)    Surgeons and Role:     * Sylvia Joshi MD - Primary    Pre-op vitals reviewed.  Temp: 97.5 °F (36.4 °C)  Pulse: 70  Resp: 16  BP: 134/79  SpO2: 99 %  Body mass index is 26.76 kg/m².    Current medications reviewed.  Hospital Medications:  • [Transfer Hold] acetaminophen (Tylenol Extra Strength) tab 1,000 mg  1,000 mg Oral Once   • lactated ringers infusion   Intravenous Continuous   • ceFAZolin (Ancef) 2 g in 20mL IV syringe premix  2 g Intravenous Once   • [Transfer Hold] diazePAM (Valium) tab 5 mg  5 mg Oral PRN   • ceFAZolin (Ancef) 2 g/20mL IV syringe premix           Outpatient Medications:     Medications Prior to Admission   Medication Sig Dispense Refill Last Dose   • amLODIPine 5 MG Oral Tab Take 1 tablet (5 mg total) by mouth daily. 90 tablet 1 2024 at 0445   • [] ALPRAZolam (XANAX) 0.5 MG Oral Tab Take 1 tablet (0.5 mg total) by mouth one time for 1 dose. 1 tablet 0 Unknown       Allergies: Aspirin, Hydrocodone, and Nsaids      Anesthesia Evaluation    Patient summary reviewed.    Anesthetic Complications  (-) history of anesthetic complications         GI/Hepatic/Renal         (+) hiatal hernia                        Cardiovascular      ECG reviewed.            (+) hypertension                                     Endo/Other  Comment: Left breast mass for excisional biopsy                                Pulmonary    Negative pulmonary ROS.                       Neuro/Psych    Negative neuro/psych ROS.                                Past Surgical History:   Procedure Laterality Date   • Arthroscopy of joint unlisted Right     knee   • Colonoscopy  2015    normal; repeat 10 yrs    • Colonoscopy      Every 10 years   • Colonoscopy,diagnostic N/A 2015    Procedure: COLONOSCOPY, POSSIBLE BIOPSY, POSSIBLE POLYPECTOMY 51829;  Surgeon: Kevin Vivas MD;  [facility] [facility]   • D & c     • Needle biopsy left  2023    Stromal fibrosis   •   Aug. 1972   • Other surgical history      bilateral feet sx-local   • Tubal ligation       Social History     Socioeconomic History   • Marital status: Life Partner   Tobacco Use   • Smoking status: Never     Passive exposure: Past (both parents smoked in home when patient was a child)   • Smokeless tobacco: Never   • Tobacco comments:     Job:  Works at [employer]   Vaping Use   • Vaping status: Never Used   Substance and Sexual Activity   • Alcohol use: No   • Drug use: Never   Other Topics Concern   • Caffeine Concern No   • Exercise No   • Seat Belt No   • Special Diet No   • Stress Concern No   • Weight Concern No     History   Drug Use Unknown     Available pre-op labs reviewed.     Lab Results   Component Value Date     2024    K 3.8 2024     2024    CO2 29.0 2024    BUN 16 2024    CREATSERUM 0.90 2024    GLU 91 2024    CA 9.2 2024            Airway      Mallampati: II  Mouth opening: >3 FB  TM distance: > 6 cm  Neck ROM: full Cardiovascular    Cardiovascular exam normal.  Rhythm: regular  Rate: normal  (-) murmur   Dental    Dentition appears grossly intact         Pulmonary    Pulmonary exam normal.  Breath sounds clear to auscultation bilaterally.               Other findings        ASA: 2   Plan: MAC  NPO status verified and patient meets guidelines.        Comment: Plan for MAC. A detailed discussion of the risks and benefits of the proposed anesthetic was had in the preoperative area, including risk of conversion to a general anesthetic, nausea/vomiting, dental damage, sore throat and allergic/ adverse reactions to medications administered.  The possibility of needing to convert to general anesthesia if necessary was discussed. Questions answered and patient wishes to proceed. Consent signed.     Plan/risks discussed with: patient            Present on Admission:  **None**

## 2024-04-26 ENCOUNTER — HOSPITAL ENCOUNTER (OUTPATIENT)
Facility: HOSPITAL | Age: 74
Setting detail: HOSPITAL OUTPATIENT SURGERY
Discharge: HOME | End: 2024-04-26
Attending: SURGERY | Admitting: SURGERY
Payer: MEDICARE

## 2024-04-26 ENCOUNTER — ANESTHESIA (OUTPATIENT)
Dept: SURGERY | Facility: HOSPITAL | Age: 74
End: 2024-04-26
Payer: MEDICARE

## 2024-04-26 ENCOUNTER — HOSPITAL ENCOUNTER (OUTPATIENT)
Dept: MAMMOGRAPHY | Facility: HOSPITAL | Age: 74
Discharge: HOME | End: 2024-04-26
Attending: SURGERY | Admitting: SURGERY
Payer: MEDICARE

## 2024-04-26 VITALS
RESPIRATION RATE: 18 BRPM | HEART RATE: 72 BPM | TEMPERATURE: 97 F | BODY MASS INDEX: 26.9 KG/M2 | SYSTOLIC BLOOD PRESSURE: 147 MMHG | DIASTOLIC BLOOD PRESSURE: 77 MMHG | WEIGHT: 137 LBS | HEIGHT: 60 IN | OXYGEN SATURATION: 97 %

## 2024-04-26 DIAGNOSIS — N63.20 MASS OF LEFT BREAST, UNSPECIFIED QUADRANT: ICD-10-CM

## 2024-04-26 PROCEDURE — 88342 IMHCHEM/IMCYTCHM 1ST ANTB: CPT | Performed by: SURGERY

## 2024-04-26 PROCEDURE — 76098 X-RAY EXAM SURGICAL SPECIMEN: CPT | Performed by: SURGERY

## 2024-04-26 PROCEDURE — 19281 PERQ DEVICE BREAST 1ST IMAG: CPT | Performed by: SURGERY

## 2024-04-26 PROCEDURE — 88307 TISSUE EXAM BY PATHOLOGIST: CPT | Performed by: SURGERY

## 2024-04-26 PROCEDURE — 0HBU0ZX EXCISION OF LEFT BREAST, OPEN APPROACH, DIAGNOSTIC: ICD-10-PCS | Performed by: SURGERY

## 2024-04-26 RX ORDER — HYDROMORPHONE HYDROCHLORIDE 1 MG/ML
0.2 INJECTION, SOLUTION INTRAMUSCULAR; INTRAVENOUS; SUBCUTANEOUS EVERY 5 MIN PRN
Status: DISCONTINUED | OUTPATIENT
Start: 2024-04-26 | End: 2024-04-26

## 2024-04-26 RX ORDER — CEFAZOLIN SODIUM/WATER 2 G/20 ML
SYRINGE (ML) INTRAVENOUS
Status: DISCONTINUED
Start: 2024-04-26 | End: 2024-04-26

## 2024-04-26 RX ORDER — HYDROCODONE BITARTRATE AND ACETAMINOPHEN 5; 325 MG/1; MG/1
2 TABLET ORAL ONCE AS NEEDED
Status: DISCONTINUED | OUTPATIENT
Start: 2024-04-26 | End: 2024-04-26

## 2024-04-26 RX ORDER — SODIUM CHLORIDE, SODIUM LACTATE, POTASSIUM CHLORIDE, CALCIUM CHLORIDE 600; 310; 30; 20 MG/100ML; MG/100ML; MG/100ML; MG/100ML
INJECTION, SOLUTION INTRAVENOUS CONTINUOUS
Status: DISCONTINUED | OUTPATIENT
Start: 2024-04-26 | End: 2024-04-26

## 2024-04-26 RX ORDER — DIAZEPAM 5 MG/1
5 TABLET ORAL AS NEEDED
Status: DISCONTINUED | OUTPATIENT
Start: 2024-04-26 | End: 2024-04-26 | Stop reason: HOSPADM

## 2024-04-26 RX ORDER — DEXAMETHASONE SODIUM PHOSPHATE 4 MG/ML
VIAL (ML) INJECTION AS NEEDED
Status: DISCONTINUED | OUTPATIENT
Start: 2024-04-26 | End: 2024-04-26 | Stop reason: SURG

## 2024-04-26 RX ORDER — ACETAMINOPHEN 500 MG
1000 TABLET ORAL ONCE
Status: DISCONTINUED | OUTPATIENT
Start: 2024-04-26 | End: 2024-04-26 | Stop reason: HOSPADM

## 2024-04-26 RX ORDER — NALOXONE HYDROCHLORIDE 0.4 MG/ML
0.08 INJECTION, SOLUTION INTRAMUSCULAR; INTRAVENOUS; SUBCUTANEOUS AS NEEDED
Status: DISCONTINUED | OUTPATIENT
Start: 2024-04-26 | End: 2024-04-26

## 2024-04-26 RX ORDER — ACETAMINOPHEN 500 MG
1000 TABLET ORAL ONCE AS NEEDED
Status: DISCONTINUED | OUTPATIENT
Start: 2024-04-26 | End: 2024-04-26

## 2024-04-26 RX ORDER — HYDROCODONE BITARTRATE AND ACETAMINOPHEN 5; 325 MG/1; MG/1
1 TABLET ORAL ONCE AS NEEDED
Status: DISCONTINUED | OUTPATIENT
Start: 2024-04-26 | End: 2024-04-26

## 2024-04-26 RX ORDER — METOCLOPRAMIDE HYDROCHLORIDE 5 MG/ML
10 INJECTION INTRAMUSCULAR; INTRAVENOUS EVERY 8 HOURS PRN
Status: DISCONTINUED | OUTPATIENT
Start: 2024-04-26 | End: 2024-04-26

## 2024-04-26 RX ORDER — HYDROMORPHONE HYDROCHLORIDE 1 MG/ML
0.6 INJECTION, SOLUTION INTRAMUSCULAR; INTRAVENOUS; SUBCUTANEOUS EVERY 5 MIN PRN
Status: DISCONTINUED | OUTPATIENT
Start: 2024-04-26 | End: 2024-04-26

## 2024-04-26 RX ORDER — LIDOCAINE HYDROCHLORIDE AND EPINEPHRINE 10; 10 MG/ML; UG/ML
INJECTION, SOLUTION INFILTRATION; PERINEURAL AS NEEDED
Status: DISCONTINUED | OUTPATIENT
Start: 2024-04-26 | End: 2024-04-26 | Stop reason: HOSPADM

## 2024-04-26 RX ORDER — HYDROCODONE BITARTRATE AND ACETAMINOPHEN 5; 325 MG/1; MG/1
1-2 TABLET ORAL EVERY 6 HOURS PRN
Qty: 20 TABLET | Refills: 0 | Status: SHIPPED | OUTPATIENT
Start: 2024-04-26 | End: 2024-05-02

## 2024-04-26 RX ORDER — HYDROMORPHONE HYDROCHLORIDE 1 MG/ML
0.4 INJECTION, SOLUTION INTRAMUSCULAR; INTRAVENOUS; SUBCUTANEOUS EVERY 5 MIN PRN
Status: DISCONTINUED | OUTPATIENT
Start: 2024-04-26 | End: 2024-04-26

## 2024-04-26 RX ORDER — ONDANSETRON 2 MG/ML
4 INJECTION INTRAMUSCULAR; INTRAVENOUS EVERY 6 HOURS PRN
Status: DISCONTINUED | OUTPATIENT
Start: 2024-04-26 | End: 2024-04-26

## 2024-04-26 RX ORDER — CEFAZOLIN SODIUM/WATER 2 G/20 ML
2 SYRINGE (ML) INTRAVENOUS ONCE
Status: COMPLETED | OUTPATIENT
Start: 2024-04-26 | End: 2024-04-26

## 2024-04-26 RX ORDER — BUPIVACAINE HYDROCHLORIDE 5 MG/ML
INJECTION, SOLUTION EPIDURAL; INTRACAUDAL AS NEEDED
Status: DISCONTINUED | OUTPATIENT
Start: 2024-04-26 | End: 2024-04-26 | Stop reason: HOSPADM

## 2024-04-26 RX ORDER — ONDANSETRON 2 MG/ML
INJECTION INTRAMUSCULAR; INTRAVENOUS AS NEEDED
Status: DISCONTINUED | OUTPATIENT
Start: 2024-04-26 | End: 2024-04-26 | Stop reason: SURG

## 2024-04-26 RX ADMIN — SODIUM CHLORIDE, SODIUM LACTATE, POTASSIUM CHLORIDE, CALCIUM CHLORIDE: 600; 310; 30; 20 INJECTION, SOLUTION INTRAVENOUS at 09:08:00

## 2024-04-26 RX ADMIN — SODIUM CHLORIDE, SODIUM LACTATE, POTASSIUM CHLORIDE, CALCIUM CHLORIDE: 600; 310; 30; 20 INJECTION, SOLUTION INTRAVENOUS at 09:41:00

## 2024-04-26 RX ADMIN — ONDANSETRON 4 MG: 2 INJECTION INTRAMUSCULAR; INTRAVENOUS at 09:13:00

## 2024-04-26 RX ADMIN — DEXAMETHASONE SODIUM PHOSPHATE 4 MG: 4 MG/ML VIAL (ML) INJECTION at 09:13:00

## 2024-04-26 RX ADMIN — CEFAZOLIN SODIUM/WATER 2 G: 2 G/20 ML SYRINGE (ML) INTRAVENOUS at 09:13:00

## 2024-04-26 NOTE — IMAGING NOTE
Assisted Dr. Robertson with needle localization of left breast for lumpectomy. Procedure explained and all questions answered. Pt verbalized understanding. Emotional support provided and pt tolerated procedure well with minimal discomfort. Wire(s) secured with Tegaderm dressing.  Pt transported to OR holding via W/C with wire intact.

## 2024-04-26 NOTE — H&P
History of Present Illness:   Ms. Silvia Rodríguez is a 74 year old woman who presents with acute enlarging left breast mass.  The patient denies any palpable masses, nipple discharge, skin changes or axillary symptoms.  She does have a family history of breast cancer.  She has a remote history of a benign biopsy of the left breast.  Her most recent bilateral diagnostic evaluation in August 2023 showed an increase in size of a mass in the left breast at 3:00, 6 cm from the nipple for which biopsy was recommended.  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  She was recommended for short-term surveillance ultrasound which took place on December 6, 2023 and confirmed interval enlargement of the lesion now up to 2.4 cm for which consideration of surgical excision was recommended.  She is here today for evaluation and recommendations for further therapy.        Past Medical History        Past Medical History:   Diagnosis Date    Abdominal distention 12/30/2019    Abdominal hernia      Abdominal pain 12/30/2019, 12/27/20     Also 8/27/2021    Allergic rhinitis      Arthritis       Fingers    Belching Constant    Bloating 12/39/2019    Body piercing Ears    Constipation 08/23/2021    Decorative tattoo One on buttock    Easy bruising      Essential hypertension      Flatulence/gas pain/belching 12/30/2019    Food intolerance       Milk, ice cream    Headache disorder       Several times per week    Heart palpitations       Occasionally    Hemorrhoids 1972    Irregular bowel habits 08/23/2021    Nausea 08/23/2021    Presence of other cardiac implants and grafts       [place] in right toe    Sleep disturbance [place]    Uncomfortable fullness after meals 08/23/2021    Wears glasses       Glasses            Past Surgical History         Past Surgical History:   Procedure Laterality Date    COLONOSCOPY   06/2015     normal; repeat 10 yrs    COLONOSCOPY         Every 10 years     COLONOSCOPY,DIAGNOSTIC N/A 2015     Procedure: COLONOSCOPY, POSSIBLE BIOPSY, POSSIBLE POLYPECTOMY 48840;  Surgeon: Kevin Vivas MD;  Location: [facility], [facility]    D & C       [facility]   Aug. 1972    OTHER SURGICAL HISTORY         bilateral feet sx-local    TUBAL LIGATION               Gynecological History:  Pt is a   Pt was 22 years old at time of first pregnancy.    She denies any cumulative breastfeeding history  She achieved menarche at age 15 and LMP age 50  Age of Menopause: 50  Type: natural menopause  She has history of hormone replacement therapy for 2 years, last in  .  She denies any history of oral contraceptive use   She denies infertility treatment to achieve pregnancy.     Medications:    Medications Ordered Today   No outpatient medications have been marked as taking for the 23 encounter (Appointment) with Sylvia Joshi MD.            Allergies:    Allergies        Allergies   Allergen Reactions    Aspirin HIVES    Norco [Hydrocodone-Acetaminophen]      Nsaids HIVES            Family History:   Family History         Family History   Problem Relation Age of Onset    Cancer Mother           Breast cancer    Breast Cancer Mother 75         estimated age    Cancer Father           Lung cancer    Dementia Sister           Alzheimers    Diabetes Sister           Alzheimers    Diabetes Brother      Other (vagina or uterine cancer) Maternal Grandmother      Ear Problems Neg      Allergies Neg      Bleeding Disorders Neg      Clotting Disorder Neg      Thyroid disease Neg      Hypertension Neg      Asthma Neg      Arthritis Neg      Anesthesia Problems  Neg              She is not of Ashkenazi Adventist ancestry.     Social History:      History   Alcohol Use No             History   Smoking Status    Never   Smokeless Tobacco    Never      Ms. Silvia Rodríguez is  with 1 children. She has 3 siblings. She is currently Homemaker     Review of Systems:  General:   The  patient denies, fever, chills, night sweats, fatigue, generalized weakness, change in appetite or weight loss.     HEENT:     The patient denies eye irritation, cataracts, redness, glaucoma, yellowing of the eyes, change in vision, color blindness, or +wearing contacts/glasses. The patient denies hearing loss, ringing in the ears, ear drainage, earaches, nasal congestion, nose bleeds, snoring, pain in mouth/throat, hoarseness, change in voice, facial trauma.     Respiratory:  The patient denies chronic cough, phlegm, hemoptysis, pleurisy/chest pain, pneumonia, asthma, wheezing, difficulty in breathing with exertion, emphysema, chronic bronchitis, shortness of breath or abnormal sound when breathing.      Cardiovascular:  There is no history of chest pain, chest pressure/discomfort, palpitations, irregular heartbeat, fainting or near-fainting, difficulty breathing when lying flat, SOB/Coughing at night, swelling of the legs or chest pain while walking.     Breasts:  See history of present illness     Gastrointestinal:     There is no history of difficulty or pain with swallowing, +reflux symptoms, vomiting, dark or bloody stools, constipation, yellowing of the skin, indigestion, nausea, change in bowel habits, diarrhea, abdominal pain or vomiting blood.      Genitourinary:  The patient denies frequent urination, +needing to get up at night to urinate, urinary hesitancy or retaining urine, painful urination, urinary incontinence, decreased urine stream, blood in the urine or vaginal/penile discharge.     Skin:    The patient denies rash, itching, skin lesions, dry skin, change in skin color or change in moles.      Hematologic/Lymphatic:  The patient denies +easily bruising or bleeding or persistent swollen glands or lymph nodes.      Musculoskeletal:  The patient denies muscle aches/pain, joint pain, stiff joints, neck pain, back pain or bone pain.     Neuropsychiatric:  There is no history of migraines or severe  headaches, seizure/epilepsy, speech problems, coordination problems, trembling/tremors, fainting/black outs, dizziness, memory problems, loss of sensation/numbness, problems walking, weakness, tingling or burning in hands/feet. There is no history of abusive relationship, bipolar disorder, sleep disturbance, anxiety, depression or feeling of despair.     Endocrine:    There is no history of poor/slow wound healing, weight loss/gain, fertility or hormone problems, cold intolerance, thyroid disease.      Allergic/Immunologic:  There is no history of hives, hay fever, angioedema or anaphylaxis.     /78 (BP Location: Right arm, Patient Position: Sitting, Cuff Size: adult)   Pulse 62   Temp 97.5 °F (36.4 °C) (Temporal)   Resp 15   Ht 1.51 m (4' 11.45\")   Wt 63.5 kg (140 lb)   SpO2 99%   BMI 27.85 kg/m²      Physical Exam:  The patient is an alert, oriented, well-nourished and  well-developed woman who appears her stated age. Her speech patterns and movements are normal. Her affect is appropriate.     HEENT: The head is normocephalic. The neck is supple. The thyroid is not enlarged and is without palpable masses/nodules. There are no palpable masses. The trachea is in the midline. Conjunctiva are clear, non-icteric.     Chest: The chest expands symmetrically. The lungs are clear to auscultation.     Heart: The rhythm is regular.  There are no murmurs, rubs, gallops or thrills.     Breasts:  Her breasts are symmetrical with a cup size 34DDD.  Right breast: The skin, nipple ,and areola appear normal. There is no skin dimpling with movement of the pectoralis. There is no nipple retraction. No nipple discharge can be elicited. The parenchyma is mildly nodular. There are no dominant masses in the breast. The axillary tail is normal.  Left breast:   The skin, nipple, and areola appear normal. There is no skin dimpling with movement of the pectoralis. There is no nipple retraction. No nipple discharge can be  elicited. The parenchyma is mildly nodular. There are no dominant masses in the breast. The axillary tail is normal.     Abdomen:  The abdomen is soft, flat and non tender. The liver is not enlarged. There are no palpable masses.     Lymph Nodes:  The supraclavicular, axillary and cervical regions are free of significant lymphadenopathy.     Back: There is no vertebral column tenderness.     Skin: The skin appears normal. There are no suspicious appearing rashes or lesions.     Extremities: The extremities are without deformity, cyanosis or edema.     Impression:   Ms. Silvia Rodríguez is a 74 year old woman presents with family history of breast cancer and biopsy confirmed benign mass in the left breast with interval enlargement on recent imaging.     Discussion and Plan:  I had a discussion with the Patient regarding her breast exam. On exam today I found no clinical evidence of disease bilaterally.  I personally reviewed the recent imaging and prior pathology we discussed this at length.     We discussed that the finding of the benign stromal fibrosis may be discordant given the interval increase in size of this lesion over time.  I therefore recommend a left breast wire localized excisional biopsy to confirm the pathological etiology.  The risks and possible complications of the procedure were explained to the patient and her family and she understood and agreed to the proposed plan. She was given ample opportunity for questions and those questions were answered to her satisfaction. She has been  encouraged to contact the office with any questions or concerns prior to her next appointment.     Pre-op Diagnosis: Mass of left breast, unspecified quadrant [N63.20]    The above referenced H&P was reviewed by Sylvia Joshi MD on 4/26/2024, the patient was examined and no significant changes have occurred in the patient's condition since the H&P was performed.  I discussed with the patient and/or legal representative the  potential benefits, risks and side effects of this procedure; the likelihood of the patient achieving goals; and potential problems that might occur during recuperation.  I discussed reasonable alternatives to the procedure, including risks, benefits and side effects related to the alternatives and risks related to not receiving this procedure.  We will proceed with procedure as planned.

## 2024-04-26 NOTE — DISCHARGE INSTRUCTIONS
Breast Surgery  Post-operative Instructions  Excisional Biopsy, Lumpectomy, Mastectomy, [place] Node Biopsy, or Axillary  Lymph Node Dissection  Sylvia Joshi MD  General Instructions  The following instructions will provide helpful information that will assist your recovery. These are designed to be general guidelines. Please remember that everyone heals and recovers differently. Listen to your body and rest when you are tired. If you have any questions or concerns, please do not hesitate to contact my office. I would like to see you in the office about one week after surgery, please schedule and appointment through my office to make a post-operative appointment if you do not already have one.     Restrictions  There are no lifting weight restrictions for the arm on the surgical side. You may gradually increase the amount of weight based on your comfort level. You should avoid a lot of repetitious activity with the arm until the drain is out (if one was placed) and the wound is well-healed (about two weeks).   You should not drive a car until you believe you can react to an emergency situation and you’re no longer taking narcotic pain medications.   You may shower the day after surgery. You should not bathe or swim (i.e. submerge wound) until the wound is well healed (about two weeks).  There are no dietary restrictions.    Exercise  You may begin arm exercises within a couple days. Do these 2 or 3 times per day, beginning with light exercise and gradually increase your range of motion and repetitions. This will help your arm regain full mobility. We will address your activity level again at your post-operative visit.   You will have pain medication prescribed before discharge. Take this as directed to relieve pain. It is important that you be comfortable so that you may continue your stretching exercises.   If you find the medication prescribed is too strong, try Tylenol (Acetaminophen) or  Ibuprofen.    Wound Care  You may remove the gauze dressing on the first or second postoperative day and then shower. You should leave the steri-strips in place; they will start to peel off about 10 days after your surgery. The stitches are all underneath the skin and will dissolve on their own. You will not need any stitches removed except if you have a drain in place.  I encourage you to shower once the outer bandage is removed, you may use soap and water directly over the steri-strips and pat dry following.  You should keep gauze dressing on the wound until the wound is completely dry and without drainage-usually 1-3 days.   If a surgical bra was placed after the surgery, I encourage you to wear it as much as possible during the week following the procedure (including during sleep). Alternatively, you may choose to wear your own bra provided it is comfortable, provides support and does not have an underwire. If the breast doesn’t move it is less painful.  If an elastic bandage was placed around your chest after the surgery you may remove it on the 1st or 2nd day after surgery. If you prefer to leave it on longer, you may.  It is normal to feel a lump in the area of the incisions for up to 6 months. This is part of the healing process. Eventually the breast will return to its normal condition.   Drain Care (if placed at time of axillary dissection or mastectomy)-This will be explained by your nurse prior to discharge.  Empty the drain and strip the tubing 2-3 times daily, more often if the plastic squeeze bottle fills up. This will prevent the tubing from clogging.   Starting at the top of the tubing next to your body firmly grasp the tubing with the index finger and thumb of one hand. With the other hand use the index finger and thumb to move the fluid down the tubing (this is called “stripping the tubing”).   Uncap the pouring spout and squeeze the contents of the plastic bottle into the measuring cup.   Squeeze  the bottle flat to create suction and replace the cap while squeezing to maintain the vacuum.   Measure and record the output and discard the fluid into the toilet. Record the output each time you empty the bottle.   Keep track of the output (drainage) and when the total is down to 30 cc’s or less over a 24-hour period we’ll remove the drain in the office. This is usually after 1-2  weeks, but can be longer in certain patients.   Drain removal takes about 30 seconds and is virtually painless. The suture is cut and the drain slides right out. You should call my office as the output approaches 30 cc’s over 24 hours so that we may schedule an office visit for drain removal.  If you have had reconstruction your plastic surgeon will remove the drain according to their protocol.    Pain Medication  You will be given a prescription for a narcotic pain medication (usually Norco) upon discharge. Many patients have very little pain and don’t want to use the narcotic. Don’t be afraid to use it if you’re uncomfortable. If you’d prefer you may substitute Tylenol or Ibuprofen (Motrin, Advil). Using an ice pack for a few minutes over the incision can also alleviate pain. If you do use the narcotic medication, use an over the counter stool softener or gentle laxative and stay well-hydrated as constipation is not uncommon with narcotics.    Pathology Report  The Pathology report is usually available 4-5 business days following the surgery. I will call you  with the results once the report is available.    Notify my office if:   Your temperature is over 101.5 F   You notice increasing swelling, redness, warmth or drainage from around the incision or drain site.    If you experience any problems please call my office and either my nurse or myself will respond. After hours, you will be forwarded to my answering service which will help you get in touch with myself or the physician covering for me.

## 2024-04-26 NOTE — BRIEF OP NOTE
Pre-Operative Diagnosis: Mass of left breast, unspecified quadrant [N63.20]     Post-Operative Diagnosis: Mass of left breast, unspecified quadrant [N63.20]      Procedure Performed:   Left breast wire localized excisional biopsy    Surgeons and Role:     * Sylvia Joshi MD - Primary    Assistant(s):  Surgical Assistant.: Emelia Gr CSA     Surgical Findings: Clip seen on xray     Specimen: L lumpectomy     Estimated Blood Loss: Blood Output: 5 mL (4/26/2024  9:41 AM)      Sylvia Joshi MD  4/26/2024  9:56 AM

## 2024-04-26 NOTE — ANESTHESIA POSTPROCEDURE EVALUATION
[facility]    Silvia Rodríguez Patient Status:  Hospital Outpatient Surgery   Age/Gender 74 year old female MRN [id number]   Location [facility] SURGERY Attending Sylvia Joshi MD   Hosp Day # 0 PCP Abena Ayala DO       Anesthesia Post-op Note    Left breast wire localized excisional biopsy    Procedure Summary       Date: 04/26/24 Room / Location:  MAIN OR [address] [place] [address] MAIN OR    Anesthesia Start: 0908 Anesthesia Stop: 0953    Procedure: Left breast wire localized excisional biopsy (Left) Diagnosis:       Mass of left breast, unspecified quadrant      (Mass of left breast, unspecified quadrant [N63.20])    Surgeons: Sylvia Joshi MD Anesthesiologist: Robbie Barnhart MD    Anesthesia Type: MAC ASA Status: 2            Anesthesia Type: MAC    Vitals Value Taken Time   BP 98/55 04/26/24 0953   Temp 97.8 04/26/24 0953   Pulse 74 04/26/24 0953   Resp 16 04/26/24 0953   SpO2 97 04/26/24 0953       Patient Location: PACU    Anesthesia Type: MAC    Airway Patency: patent    Postop Pain Control: adequate    Mental Status: preanesthetic baseline    Nausea/Vomiting: none    Cardiopulmonary/Hydration status: stable euvolemic    Complications: no apparent anesthesia related complications    Postop vital signs: stable    Dental Exam: Unchanged from Preop    Patient to be discharged home when criteria met.

## 2024-04-27 NOTE — OPERATIVE REPORT
[facility]    PATIENT'S NAME: LUIS CARSON   ATTENDING PHYSICIAN: Sylvia Joshi M.D.   OPERATING PHYSICIAN: Sylvia Joshi M.D.   PATIENT ACCOUNT#:   [id number]    LOCATION:  [facility] 20 EDWP 10  MEDICAL RECORD #:   [id number]       YOB: 1950  ADMISSION DATE:       04/26/2024      OPERATION DATE:  04/26/2024    OPERATIVE REPORT    PREOPERATIVE DIAGNOSIS:  Left breast mass.  POSTOPERATIVE DIAGNOSIS:  Left breast mass.  PROCEDURE:  Left breast wire-localized lumpectomy with left breast specimen radiography.    ASSISTANT:  Emelia Gr CSA    ANESTHESIA:  Monitored anesthesia care and local.    ESTIMATED BLOOD LOSS:  5 mL.    DRAINS:  None.    COMPLICATIONS:  None.    DISPOSITION:  Stable on transfer to the recovery room.    INDICATIONS:  The patient is a 74-year-old female presenting with an enlarging left breast mass.  She was found on biopsy previously to have a benign finding in this area that is just considered discordant in light of the interval increase in size, and therefore, formal surgical excision is recommended to exclude coexisting pathology in this location.  Risks and possible complications were discussed with the patient including but not limited to infection, bleeding, injury to surrounding structures, possible need for reoperation.  She agreed to the proposed surgery.    OPERATIVE TECHNIQUE:  Patient was brought to the imaging suite, underwent a wire localization in the area of concern in the left breast.  She was then brought to the OR, placed in supine position, properly padded and secured.  She was given a dose of IV antibiotics, and sequential compression devices were applied to her legs for DVT prophylaxis.  Monitored anesthesia care was induced.  The left breast was then prepped and draped in the usual sterile fashion.  Then, 1% lidocaine with epinephrine was used to infiltrate the skin and subcutaneous tissues at the targeted incision site.  A  curvilinear incision was made along the lateral areolar border with a 15-blade knife for the skin.  Wire was identified and brought in the field and using sharp dissection and electrocautery, a segment of the breast tissue surrounding the tip of the wire was carefully excised and oriented with a short stitch, single clip superiorly and long stitch, double clip laterally in order to allow for appropriate pathological margin and specimen review.  It was then placed in the imaging device where specimen x-ray confirmed the presence of targeted clip residual area with adequate margins as deemed by myself, and the clip was placed within the cavity to assist with subsequent surveillance.  The wound was irrigated.  Hemostasis assured with electrocautery.  Closure was accomplished with interrupted 3-0 Vicryl for deep layer, running 4-0 subcuticular Monocryl for the skin.  Mastisol and Steri-Strips were applied.  Then, 0.5% Marcaine was instilled in the cavity to assist with postoperative analgesia.  Sterile dressing and compression bra were placed.  Blood loss was minimal.  All counts were correct at the conclusion of the procedure.  She tolerated the procedure well.  She was transferred to recovery area in stable condition.    Dictated By Sylvia Joshi M.D.  d: 04/26/2024 10:08:58  t: 04/26/2024 19:41:37  [facility] [id number]  [facility] – [place], [place]/    cc: Abena Ayala D.O.

## 2024-05-02 ENCOUNTER — OFFICE VISIT (OUTPATIENT)
Dept: SURGERY | Facility: CLINIC | Age: 74
End: 2024-05-02
Payer: MEDICARE

## 2024-05-02 VITALS
TEMPERATURE: 97 F | OXYGEN SATURATION: 98 % | WEIGHT: 139.63 LBS | DIASTOLIC BLOOD PRESSURE: 73 MMHG | SYSTOLIC BLOOD PRESSURE: 132 MMHG | RESPIRATION RATE: 17 BRPM | HEART RATE: 82 BPM | BODY MASS INDEX: 27 KG/M2

## 2024-05-02 DIAGNOSIS — N63.20 MASS OF LEFT BREAST, UNSPECIFIED QUADRANT: Primary | ICD-10-CM

## 2024-05-02 DIAGNOSIS — N60.92 ATYPICAL LOBULAR HYPERPLASIA (ALH) OF LEFT BREAST: ICD-10-CM

## 2024-05-02 RX ORDER — ACETAMINOPHEN 500 MG
500 TABLET ORAL EVERY 6 HOURS PRN
COMMUNITY

## 2024-05-06 NOTE — PROGRESS NOTES
Breast Surgery Post-Operative Visit    Diagnosis: Atypical lobular hyperplasia, left breast, status post excisional biopsy on 4/26/2024    Stage: N/A    Disease Status:  Surgical treatment complete, high risk surveillance pending.    History of Present Illness:   Ms. Silvia Rodríguez is a 74 year old woman who presents with acute enlarging left breast mass.  The patient denies any palpable masses, nipple discharge, skin changes or axillary symptoms.  She does have a family history of breast cancer.  She has a remote history of a benign biopsy of the left breast.  Her most recent bilateral diagnostic evaluation in August 2023 showed an increase in size of a mass in the left breast at 3:00, 6 cm from the nipple for which biopsy was recommended.  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  She was recommended for short-term surveillance ultrasound which took place on December 6, 2023 and confirmed interval enlargement of the lesion now up to 2.4 cm for which consideration of surgical excision was recommended. She underwent excisional biopsy, which occurred without complication. She is here for postoperative visit. She reports her pain is under control. She denies erythema, warmth, drainage, or fevers.  She is taking Tylenol for pain   She is here today for evaluation and recommendations for further therapy.        Past Medical History:    Abdominal distention    Abdominal hernia    Abdominal pain    Also 8/27/2021    Allergic rhinitis    Arthritis    Fingers    Belching    Bloating    Body piercing    Constipation    Decorative tattoo    Easy bruising    Essential hypertension    Flatulence/gas pain/belching    Food intolerance    Milk, ice cream    Headache disorder    Several times per week    Heart palpitations    Occasionally    Hemorrhoids    High blood pressure    History of stomach ulcers    Irregular bowel habits    Nausea    Presence of other cardiac implants and grafts     [place] in right toe    Sleep disturbance    Uncomfortable fullness after meals    Visual impairment    glaases    Wears glasses    Glasses       Past Surgical History:   Procedure Laterality Date    Arthroscopy of joint unlisted Right     knee    Colonoscopy  2015    normal; repeat 10 yrs    Colonoscopy      Every 10 years    Colonoscopy,diagnostic N/A 2015    Procedure: COLONOSCOPY, POSSIBLE BIOPSY, POSSIBLE POLYPECTOMY 01967;  Surgeon: Kevin Vivas MD;  Location: [facility], [facility]    D & c  1971    Needle biopsy left  2023    Stromal fibrosis      Aug. 1972    Other surgical history      bilateral feet sx-local    Tubal ligation         Gynecological History:  Pt is a   Pt was 22 years old at time of first pregnancy.    She denies any cumulative breastfeeding history  She achieved menarche at age 15 and LMP age 50  Age of Menopause: 50  Type: natural menopause  She has history of hormone replacement therapy for 2 years, last in  .  She denies any history of oral contraceptive use   She denies infertility treatment to achieve pregnancy.    Medications:     acetaminophen (TYLENOL EXTRA STRENGTH) 500 MG Oral Tab Take 1 tablet (500 mg total) by mouth every 6 (six) hours as needed for Pain.      amLODIPine 5 MG Oral Tab Take 1 tablet (5 mg total) by mouth daily. 90 tablet 1       Allergies:    Allergies   Allergen Reactions    Aspirin HIVES    Hydrocodone UNKNOWN    Nsaids HIVES       Family History:   Family History   Problem Relation Age of Onset    Cancer Mother         Breast cancer    Breast Cancer Mother 75        estimated age    Cancer Father         Lung cancer    Dementia Sister         Alzheimers    Diabetes Sister         Alzheimers    Diabetes Brother     Other (vagina or uterine cancer) Maternal Grandmother     Ear Problems Neg     Allergies Neg     Bleeding Disorders Neg     Clotting Disorder Neg     Thyroid disease Neg     Hypertension Neg     Asthma Neg      Arthritis Neg     Anesthesia Problems  Neg        She is not of Ashkenazi Jehovah's witness ancestry.    Social History:  History   Alcohol Use No       History   Smoking Status    Never   Smokeless Tobacco    Never     Ms. Silvia Rodríguez is  with 1 children. She has 3 siblings. She is currently Homemaker    Review of Systems:  General:   The patient denies, fever, chills, night sweats, fatigue, generalized weakness, change in appetite or weight loss.    HEENT:     The patient denies eye irritation, cataracts, redness, glaucoma, yellowing of the eyes, change in vision, color blindness, or +wearing contacts/glasses. The patient denies hearing loss, ringing in the ears, ear drainage, earaches, nasal congestion, nose bleeds, snoring, pain in mouth/throat, hoarseness, change in voice, facial trauma.    Respiratory:  The patient denies chronic cough, phlegm, hemoptysis, pleurisy/chest pain, pneumonia, asthma, wheezing, difficulty in breathing with exertion, emphysema, chronic bronchitis, shortness of breath or abnormal sound when breathing.     Cardiovascular:  There is no history of chest pain, chest pressure/discomfort, palpitations, irregular heartbeat, fainting or near-fainting, difficulty breathing when lying flat, SOB/Coughing at night, swelling of the legs or chest pain while walking.    Breasts:  See history of present illness    Gastrointestinal:     There is no history of difficulty or pain with swallowing, +reflux symptoms, vomiting, dark or bloody stools, constipation, yellowing of the skin, indigestion, nausea, change in bowel habits, diarrhea, abdominal pain or vomiting blood.     Genitourinary:  The patient denies frequent urination, +needing to get up at night to urinate, urinary hesitancy or retaining urine, painful urination, urinary incontinence, decreased urine stream, blood in the urine or vaginal/penile discharge.    Skin:    The patient denies rash, itching, skin lesions, dry skin, change in skin color or  change in moles.     Hematologic/Lymphatic:  The patient denies +easily bruising or bleeding or persistent swollen glands or lymph nodes.     Musculoskeletal:  The patient denies muscle aches/pain, joint pain, stiff joints, neck pain, back pain or bone pain.    Neuropsychiatric:  There is no history of migraines or severe headaches, seizure/epilepsy, speech problems, coordination problems, trembling/tremors, fainting/black outs, dizziness, memory problems, loss of sensation/numbness, problems walking, weakness, tingling or burning in hands/feet. There is no history of abusive relationship, bipolar disorder, sleep disturbance, anxiety, depression or feeling of despair.    Endocrine:    There is no history of poor/slow wound healing, weight loss/gain, fertility or hormone problems, cold intolerance, thyroid disease.     Allergic/Immunologic:  There is no history of hives, hay fever, angioedema or anaphylaxis.    /73 (BP Location: Right arm, Patient Position: Sitting, Cuff Size: adult)   Pulse 82   Temp 97 °F (36.1 °C) (Temporal)   Resp 17   Wt 63.3 kg (139 lb 9.6 oz)   SpO2 98%   BMI 27.26 kg/m²     Physical Exam:  The patient is an alert, oriented, well-nourished and  well-developed woman who appears her stated age. Her speech patterns and movements are normal. Her affect is appropriate.    HEENT: The head is normocephalic. The neck is supple. The thyroid is not enlarged and is without palpable masses/nodules. There are no palpable masses. The trachea is in the midline. Conjunctiva are clear, non-icteric.    Chest: The chest expands symmetrically. The lungs are clear to auscultation.    Heart: The rhythm is regular.  There are no murmurs, rubs, gallops or thrills.    Breasts:  Her breasts are symmetrical with a cup size 34DDD.  Right breast: The skin, nipple ,and areola appear normal. There is no skin dimpling with movement of the pectoralis. There is no nipple retraction. No nipple discharge can be  elicited. The parenchyma is mildly nodular. There are no dominant masses in the breast. The axillary tail is normal.  Left breast:   The skin, nipple, and areola appear normal. There is no skin dimpling with movement of the pectoralis. There is no nipple retraction. No nipple discharge can be elicited. The parenchyma is mildly nodular. There are no dominant masses in the breast. The axillary tail is normal.    Abdomen:  The abdomen is soft, flat and non tender. The liver is not enlarged. There are no palpable masses.    Lymph Nodes:  The supraclavicular, axillary and cervical regions are free of significant lymphadenopathy.    Back: There is no vertebral column tenderness.    Skin: The skin appears normal. There are no suspicious appearing rashes or lesions.    Extremities: The extremities are without deformity, cyanosis or edema.    Impression:   Ms. Silvia Rodríguez is a 74 year old woman presents with family history of breast cancer and biopsy confirmed benign mass in the left breast with interval enlargement on recent imaging, s/p excision.     Recommendations:   I had a discussion with the Patient regarding her breast exam.  She is healing well since surgery with no signs of infection. I reviewed her pathology.  She will follow-up with Dr. Joshi for a second postoperative visit. I encouraged her to continue monitoring her ROM and strength and explained that a referral to physical therapy may be warranted in the future if she identifies any limitations or restrictions. She was given ample opportunity for questions and those questions were answered to her satisfaction. She was encouraged to contact the office with any questions or concerns prior to her next scheduled appointment.

## 2024-05-07 ENCOUNTER — OFFICE VISIT (OUTPATIENT)
Dept: SURGERY | Facility: CLINIC | Age: 74
End: 2024-05-07

## 2024-05-07 VITALS
DIASTOLIC BLOOD PRESSURE: 71 MMHG | SYSTOLIC BLOOD PRESSURE: 137 MMHG | RESPIRATION RATE: 17 BRPM | HEART RATE: 82 BPM | BODY MASS INDEX: 27 KG/M2 | OXYGEN SATURATION: 96 % | WEIGHT: 138.81 LBS | TEMPERATURE: 98 F

## 2024-05-07 DIAGNOSIS — N60.92 ATYPICAL LOBULAR HYPERPLASIA (ALH) OF LEFT BREAST: Primary | ICD-10-CM

## 2024-05-07 PROCEDURE — 99024 POSTOP FOLLOW-UP VISIT: CPT | Performed by: SURGERY

## 2024-05-10 ENCOUNTER — HOSPITAL ENCOUNTER (OUTPATIENT)
Dept: MRI IMAGING | Facility: HOSPITAL | Age: 74
Discharge: HOME | End: 2024-05-10
Attending: STUDENT IN AN ORGANIZED HEALTH CARE EDUCATION/TRAINING PROGRAM

## 2024-05-10 DIAGNOSIS — K62.89 RECTAL MASS: ICD-10-CM

## 2024-05-10 PROCEDURE — A9575 INJ GADOTERATE MEGLUMI 0.1ML: HCPCS

## 2024-05-10 PROCEDURE — 72197 MRI PELVIS W/O & W/DYE: CPT | Performed by: STUDENT IN AN ORGANIZED HEALTH CARE EDUCATION/TRAINING PROGRAM

## 2024-05-10 RX ORDER — GADOTERATE MEGLUMINE 376.9 MG/ML
15 INJECTION INTRAVENOUS
Status: COMPLETED | OUTPATIENT
Start: 2024-05-10 | End: 2024-05-10

## 2024-05-10 RX ADMIN — GADOTERATE MEGLUMINE 12 ML: 376.9 INJECTION INTRAVENOUS at 17:21:00

## 2024-05-16 PROBLEM — N60.92 ATYPICAL LOBULAR HYPERPLASIA (ALH) OF LEFT BREAST: Status: ACTIVE | Noted: 2024-05-16

## 2024-05-16 NOTE — PROGRESS NOTES
Breast Surgery Post-Operative Visit    Diagnosis: Atypical lobular hyperplasia, left breast, status post excisional biopsy on 4/26/2024.    Stage: N/A    Disease Status:  Surgical treatment complete, high risk surveillance and chemoprevention counseling pending.    History of Present Illness:   Ms. Silvia Rodríguez is a 74 year old woman who presents with acute enlarging left breast mass.  The patient denies any palpable masses, nipple discharge, skin changes or axillary symptoms.  She does have a family history of breast cancer.  She has a remote history of a benign biopsy of the left breast.  Her most recent bilateral diagnostic evaluation in August 2023 showed an increase in size of a mass in the left breast at 3:00, 6 cm from the nipple for which biopsy was recommended.  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  She was recommended for short-term surveillance ultrasound which took place on December 6, 2023 and confirmed interval enlargement of the lesion now up to 2.4 cm for which consideration of surgical excision was recommended. She underwent excisional biopsy, which occurred without complication. She is here for postoperative visit. She reports her pain is under control. She denies erythema, warmth, drainage, or fevers.  She is here today for evaluation and recommendations for further therapy.        Past Medical History:    Abdominal distention    Abdominal hernia    Abdominal pain    Also 8/27/2021    Allergic rhinitis    Arthritis    Fingers    Belching    Bloating    Body piercing    Constipation    Decorative tattoo    Easy bruising    Essential hypertension    Flatulence/gas pain/belching    Food intolerance    Milk, ice cream    Headache disorder    Several times per week    Heart palpitations    Occasionally    Hemorrhoids    High blood pressure    History of stomach ulcers    Irregular bowel habits    Nausea    Presence of other cardiac implants and grafts     [place] in right toe    Sleep disturbance    Uncomfortable fullness after meals    Visual impairment    glaases    Wears glasses    Glasses       Past Surgical History:   Procedure Laterality Date    Arthroscopy of joint unlisted Right     knee    Colonoscopy  2015    normal; repeat 10 yrs    Colonoscopy      Every 10 years    Colonoscopy,diagnostic N/A 2015    Procedure: COLONOSCOPY, POSSIBLE BIOPSY, POSSIBLE POLYPECTOMY 33741;  Surgeon: Kevin Vivas MD;  Location: [facility], [facility]    D & c  1971    Needle biopsy left  2023    Stromal fibrosis      Aug. 1972    Other surgical history      bilateral feet sx-local    Tubal ligation         Gynecological History:  Pt is a   Pt was 22 years old at time of first pregnancy.    She denies any cumulative breastfeeding history  She achieved menarche at age 15 and LMP age 50  Age of Menopause: 50  Type: natural menopause  She has history of hormone replacement therapy for 2 years, last in  .  She denies any history of oral contraceptive use   She denies infertility treatment to achieve pregnancy.    Medications:     acetaminophen (TYLENOL EXTRA STRENGTH) 500 MG Oral Tab Take 1 tablet (500 mg total) by mouth every 6 (six) hours as needed for Pain.      amLODIPine 5 MG Oral Tab Take 1 tablet (5 mg total) by mouth daily. 90 tablet 1       Allergies:    Allergies   Allergen Reactions    Aspirin HIVES    Hydrocodone UNKNOWN    Nsaids HIVES       Family History:   Family History   Problem Relation Age of Onset    Cancer Mother         Breast cancer    Breast Cancer Mother 75        estimated age    Cancer Father         Lung cancer    Dementia Sister         Alzheimers    Diabetes Sister         Alzheimers    Diabetes Brother     Other (vagina or uterine cancer) Maternal Grandmother     Ear Problems Neg     Allergies Neg     Bleeding Disorders Neg     Clotting Disorder Neg     Thyroid disease Neg     Hypertension Neg     Asthma Neg      Arthritis Neg     Anesthesia Problems  Neg        She is not of Ashkenazi [employer] ancestry.    Social History:  History   Alcohol Use No       History   Smoking Status    Never   Smokeless Tobacco    Never     Ms. Silvia Rodríguez is  with 1 children. She has 3 siblings. She is currently Homemaker    Review of Systems:  General:   The patient denies, fever, chills, night sweats, fatigue, generalized weakness, change in appetite or weight loss.    HEENT:     The patient denies eye irritation, cataracts, redness, glaucoma, yellowing of the eyes, change in vision, color blindness, or +wearing contacts/glasses. The patient denies hearing loss, ringing in the ears, ear drainage, earaches, nasal congestion, nose bleeds, snoring, pain in mouth/throat, hoarseness, change in voice, facial trauma.    Respiratory:  The patient denies chronic cough, phlegm, hemoptysis, pleurisy/chest pain, pneumonia, asthma, wheezing, difficulty in breathing with exertion, emphysema, chronic bronchitis, shortness of breath or abnormal sound when breathing.     Cardiovascular:  There is no history of chest pain, chest pressure/discomfort, palpitations, irregular heartbeat, fainting or near-fainting, difficulty breathing when lying flat, SOB/Coughing at night, swelling of the legs or chest pain while walking.    Breasts:  See history of present illness    Gastrointestinal:     There is no history of difficulty or pain with swallowing, +reflux symptoms, vomiting, dark or bloody stools, constipation, yellowing of the skin, indigestion, nausea, change in bowel habits, diarrhea, abdominal pain or vomiting blood.     Genitourinary:  The patient denies frequent urination, +needing to get up at night to urinate, urinary hesitancy or retaining urine, painful urination, urinary incontinence, decreased urine stream, blood in the urine or vaginal/penile discharge.    Skin:    The patient denies rash, itching, skin lesions, dry skin, change in skin color or  change in moles.     Hematologic/Lymphatic:  The patient denies +easily bruising or bleeding or persistent swollen glands or lymph nodes.     Musculoskeletal:  The patient denies muscle aches/pain, joint pain, stiff joints, neck pain, back pain or bone pain.    Neuropsychiatric:  There is no history of migraines or severe headaches, seizure/epilepsy, speech problems, coordination problems, trembling/tremors, fainting/black outs, dizziness, memory problems, loss of sensation/numbness, problems walking, weakness, tingling or burning in hands/feet. There is no history of abusive relationship, bipolar disorder, sleep disturbance, anxiety, depression or feeling of despair.    Endocrine:    There is no history of poor/slow wound healing, weight loss/gain, fertility or hormone problems, cold intolerance, thyroid disease.     Allergic/Immunologic:  There is no history of hives, hay fever, angioedema or anaphylaxis.    /71 (BP Location: Right arm, Patient Position: Sitting, Cuff Size: adult)   Pulse 82   Temp 97.6 °F (36.4 °C) (Temporal)   Resp 17   Wt 63 kg (138 lb 12.8 oz)   SpO2 96%   BMI 27.11 kg/m²     Physical Exam:  The patient is an alert, oriented, well-nourished and  well-developed woman who appears her stated age. Her speech patterns and movements are normal. Her affect is appropriate.    HEENT: The head is normocephalic. The neck is supple. The thyroid is not enlarged and is without palpable masses/nodules. There are no palpable masses. The trachea is in the midline. Conjunctiva are clear, non-icteric.    Chest: The chest expands symmetrically. The lungs are clear to auscultation.    Heart: The rhythm is regular.  There are no murmurs, rubs, gallops or thrills.    Breasts:  Her breasts are symmetrical with a cup size 34DDD.  Right breast: The skin, nipple ,and areola appear normal. There is no skin dimpling with movement of the pectoralis. There is no nipple retraction. No nipple discharge can be  elicited. The parenchyma is mildly nodular. There are no dominant masses in the breast. The axillary tail is normal.  Left breast:   The skin, nipple, and areola appear normal. There is no skin dimpling with movement of the pectoralis. There is no nipple retraction. No nipple discharge can be elicited. The parenchyma is mildly nodular. There are no dominant masses in the breast. The axillary tail is normal.  There is a well-healed incision with no signs of infection.    Abdomen:  The abdomen is soft, flat and non tender. The liver is not enlarged. There are no palpable masses.    Lymph Nodes:  The supraclavicular, axillary and cervical regions are free of significant lymphadenopathy.    Back: There is no vertebral column tenderness.    Skin: The skin appears normal. There are no suspicious appearing rashes or lesions.    Extremities: The extremities are without deformity, cyanosis or edema.    Impression:   Ms. Silvia Rodríguez is a 74 year old woman presents with family history of breast cancer and biopsy confirmed benign mass in the left breast with interval enlargement on recent imaging, s/p excision.     Recommendations:   I had a discussion with the Patient regarding her breast exam.  She is healing well since surgery with no signs of infection. I reviewed her pathology.  Her pathology confirmed atypical lobular hyperplasia with no additional findings.  We discussed the significance invocation including effect on future breast cancer risk.      Discuss the role of chemoprevention.      Discussed the following data:  --Tamoxifen 20 mg a day for 5 yrs shown to reduce the risk of breast cancer by 49% and among women with h/o atypical hyperplasia, same dose and duration was associated with a risk reduction of 86% reduction in breast cancer risk.  FDA approved the use of Tamoxifen for breast cancer risk reduction in premenopausal women at increased risk for breast cancer based upon the results of the NSABP Breast Cancer  Prevention Trial in 1998. The criteria for inclusion included a 5 year risk of breast cancer greater than 1.7% based on the Rosa Maria Model which in order to establish a favorable risk versus benefit profile.   --Raloxifine at 60 mg for post-menopausal women was found to be equivalent to tamoxifen for breast cancer risk reduction in the initial comparison, in the long-term f/u it appears to be less efficacious in risk reduction than tamoxifen.  Consideration of toxicity may still lead to the choice of raloxifine over tamoxifen in women with intact uterus.     --Exemestane for post-menopausal women in a large single randomizes study in women with LCIS was found to reduce the relative incidence of invasive breast cancer by 65% at 3 yr median f/u.  --Anastrozole for post-menopausal women was found to reduce the relative incidence of breast cancer by 53% at a 5 yr median f/u.     Will plan for bilateral diagnostic evaluation high risk surveillance in April 2025 with a clinical exam to follow.  Presently she is not inclined to start any chemoprevention for concern of side effects.  She was given ample opportunity for questions and those questions were answered to her satisfaction. She was encouraged to contact the office with any questions or concerns prior to her next scheduled appointment.

## 2024-07-05 ENCOUNTER — OFFICE VISIT (OUTPATIENT)
Dept: NEUROLOGY | Facility: CLINIC | Age: 74
End: 2024-07-05
Payer: MEDICARE

## 2024-07-05 ENCOUNTER — TELEPHONE (OUTPATIENT)
Dept: NEUROLOGY | Facility: CLINIC | Age: 74
End: 2024-07-05

## 2024-07-05 VITALS
BODY MASS INDEX: 26.7 KG/M2 | OXYGEN SATURATION: 98 % | HEART RATE: 76 BPM | SYSTOLIC BLOOD PRESSURE: 118 MMHG | WEIGHT: 136 LBS | HEIGHT: 60 IN | DIASTOLIC BLOOD PRESSURE: 74 MMHG | RESPIRATION RATE: 21 BRPM

## 2024-07-05 DIAGNOSIS — R51.9 FACIAL PAIN: Primary | ICD-10-CM

## 2024-07-05 DIAGNOSIS — G50.0 TRIGEMINAL NEURALGIA OF RIGHT SIDE OF FACE: ICD-10-CM

## 2024-07-05 DIAGNOSIS — R20.2 PARESTHESIAS: ICD-10-CM

## 2024-07-05 PROCEDURE — 99204 OFFICE O/P NEW MOD 45 MIN: CPT | Performed by: STUDENT IN AN ORGANIZED HEALTH CARE EDUCATION/TRAINING PROGRAM

## 2024-07-05 RX ORDER — DIAZEPAM 2 MG/1
2 TABLET ORAL AS NEEDED
Qty: 2 TABLET | Refills: 0 | Status: SHIPPED | OUTPATIENT
Start: 2024-07-05

## 2024-07-05 RX ORDER — OXCARBAZEPINE 300 MG/1
TABLET, FILM COATED ORAL
Qty: 60 TABLET | Refills: 11 | Status: SHIPPED | OUTPATIENT
Start: 2024-07-05 | End: 2024-07-05

## 2024-07-05 NOTE — PATIENT INSTRUCTIONS
Plan:  MRI brain with/without contrast, MRA head  Read about oxcarbazepine/trileptal if interested in starting med for symptoms    Refill policies:    Allow 2-3 business days for refills; controlled substances may take longer.  Contact your pharmacy at least 5 days prior to running out of medication and have them send an electronic request or submit request through the “request refill” option in your [employer] account.  Refills are not addressed on weekends; covering physicians do not authorize routine medications on weekends.  No narcotics or controlled substances are refilled after noon on Fridays or by on call physicians.  By law, narcotics must be electronically prescribed.  A 30 day supply with no refills is the maximum allowed.  If your prescription is due for a refill, you may be due for a follow up appointment.  To best provide you care, patients receiving routine medications need to be seen at least once a year.  Patients receiving narcotic/controlled substance medications need to be seen at least once every 3 months.  In the event that your preferred pharmacy does not have the requested medication in stock (e.g. Backordered), it is your responsibility to find another pharmacy that has the requested medication available.  We will gladly send a new prescription to that pharmacy at your request.    Scheduling Tests:    If your physician has ordered radiology tests such as MRI or CT scans, please contact Central Scheduling at [phone] right away to schedule the test.  Once scheduled, the [facility] Centralized Referral Team will work with your insurance carrier to obtain pre-certification or prior authorization.  Depending on your insurance carrier, approval may take 3-10 days.  It is highly recommended patients assure they have received an authorization before having a test performed.  If test is done without insurance authorization, patient may be responsible for the entire amount billed.      Precertification  and Prior Authorizations:  If your physician has recommended that you have a procedure or additional testing performed the [facility] Centralized Referral Team will contact your insurance carrier to obtain pre-certification or prior authorization.    You are strongly encouraged to contact your insurance carrier to verify that your procedure/test has been approved and is a COVERED benefit.  Although the [facility] Centralized Referral Team does its due diligence, the insurance carrier gives the disclaimer that \"Although the procedure is authorized, this does not guarantee payment.\"    Ultimately the patient is responsible for payment.   Thank you for your understanding in this matter.  Paperwork Completion:  If you require FMLA or disability paperwork for your recovery, please make sure to either drop it off or have it faxed to our office at [phone]. Be sure the form has your name and date of birth on it.  The form will be faxed to our Forms Department and they will complete it for you.  There is a 25$ fee for all forms that need to be filled out.  Please be aware there is a 10-14 day turnaround time.  You will need to sign a release of information (ELIJAH) form if your paperwork does not come with one.  You may call the Forms Department with any questions at [phone].  Their fax number is [phone].

## 2024-07-05 NOTE — H&P
Neurology New Office Visit    Silvia Rodríguez   Date of Birth 1/22/1950    Subjective:  Silvia Rodríguez is a(n) 74 year old female benign left breast mass (sp excision), pulmonary nodule (surveillance per pulmonology), PET positive anal lesions (GI w reassuring workup, mri unrevealing), hypertension, pericardial effusion (TTE pending) who presents for right lower facial paresthesias.     Saw PCP with 6 weeks of right sided jaw pain, has seen dentist, two endodontists, oromaxillofacial surgeron (Had xray with endodontist), exams all unrevealing and reassuring.     Per patient symptoms started 2/2024 has been stressed past few years (health stress w fiance, renal transplant, cardic disease). Unclear if came on suddenly or gradually. Location is from right in front of right ear to about right lower chin. Feels like toothache. Worse when brushing teeth and with talking, inconsistently. No sensory trick to improve. Pain is there most of the time but waxes and wanes in severity. At worse times, will last about 15 seconds. Worse pain happens about three times per day. Skin feels a little tight. Worse with heat. No numbness, weakness, diplopia, swallowing troubles, gait issues, weakness elswhere. No associated headache, eye tearing, red eyes, rhinorrhea. Nothing similar in the past. Overall compared to February perhaps slightly worse, happening slightly more frequently. Tried tylenol- perhaps helps slightly and tramadol- loopy. No hearing changes.     Problem List:  Patient Active Problem List   Diagnosis    Gastritis    Hiatal hernia    Abnormal findings on diagnostic imaging of digestive system    Benign neoplasm of transverse colon    Benign neoplasm of sigmoid colon    Diverticulosis    Internal hemorrhoids    Anorectal skin tags    Mass of left breast    Atypical lobular hyperplasia (ALH) of left breast       PMHx:  Past Medical History:    Abdominal distention    Abdominal hernia    Abdominal pain    Also 8/27/2021    Allergic  rhinitis    Arthritis    Fingers    Belching    Bloating    Body piercing    Constipation    Decorative tattoo    Easy bruising    Essential hypertension    Flatulence/gas pain/belching    Food intolerance    Milk, ice cream    Headache disorder    Several times per week    Heart palpitations    Occasionally    Hemorrhoids    High blood pressure    History of stomach ulcers    Irregular bowel habits    Nausea    Presence of other cardiac implants and grafts    [place] in right toe    Sleep disturbance    Uncomfortable fullness after meals    Visual impairment    glaases    Wears glasses    Glasses       PSHx:  Past Surgical History:   Procedure Laterality Date    Arthroscopy of joint unlisted Right     knee    Colonoscopy  2015    normal; repeat 10 yrs    Colonoscopy      Every 10 years    Colonoscopy,diagnostic N/A 2015    Procedure: COLONOSCOPY, POSSIBLE BIOPSY, POSSIBLE POLYPECTOMY 26409;  Surgeon: Kevin Vivas MD;  [place] [facility] [place] [facility] [place]    D & c  [facility]    Needle biopsy left  2023    Stromal fibrosis      Aug. 1972    Other surgical history      bilateral feet sx-local    Tubal ligation         SocHx:  Social History     Socioeconomic History    Marital status: Life Partner   Tobacco Use    Smoking status: Never     Passive exposure: Past (both parents smoked in home when patient was a child)    Smokeless tobacco: Never    Tobacco comments:     Job:  Works at [employer]   Vaping Use    Vaping status: Never Used   Substance and Sexual Activity    Alcohol use: No    Drug use: Never   Other Topics Concern    Caffeine Concern No    Exercise No    Seat Belt No    Special Diet No    Stress Concern No    Weight Concern No       Family History:  Family History   Problem Relation Age of Onset    Cancer Mother         Breast cancer    Breast Cancer Mother 75        estimated age    Cancer Father         Lung cancer    Dementia Sister         Alzheimers    Diabetes Sister          Alzheimers    Diabetes Brother     Other (vagina or uterine cancer) Maternal Grandmother     Ear Problems Neg     Allergies Neg     Bleeding Disorders Neg     Clotting Disorder Neg     Thyroid disease Neg     Hypertension Neg     Asthma Neg     Arthritis Neg     Anesthesia Problems  Neg        Current Medications:  Current Outpatient Medications   Medication Sig Dispense Refill    diazePAM 2 MG Oral Tab Take 1 tablet (2 mg total) by mouth as needed for Anxiety (Take one tab (2 mg) 30 minutes prior to MRI for anxiety, may repeat once during MRI if needed. Do not drive after taking for remainder of day/get a ride home.). 2 tablet 0    amLODIPine 5 MG Oral Tab Take 1 tablet (5 mg total) by mouth daily. 90 tablet 1       ROS:  No big changes in weight recently, no blood in stool, no night sweats, no chills.     Objective/Physical Exam:    Vital Signs:  Blood pressure 118/74, pulse 76, resp. rate 21, height 60\", weight 136 lb (61.7 kg), SpO2 98%.  General: Alert, good recall of personal medical history    Respiratory: Non labored breathing on room air    Cardiovascular: Regular rate    Mental status: Alert, oriented, language fluent, comprehension intact    Cranial nerves: Optic disc margins sharp VF full to confrontation bilaterally. Pupils equal, round, equally reactive to light and accommodation. Extraocular eye movements intact without nystagmus. Face sensation intact to light touch. Face strength symmetric and intact. No dysarthria. Did not provoke worse pain with touching in front of r ear today    Motor:   Power:   -Right and left  upper extremity: shoulder abductors 5, wrist extensors 5, finger extension 5  -Right and left lower extremity: hip flexion 5, knee extension 5, dorsiflexion 5  Tone: Normal   Bulk: Normal  Abnormal movements: None    Sensory: Intact to light touch in distal extremities.    Coordination: Finger to nose and heel to shin intact.    Reflexes: Right/Left: Biceps 2/2, brachioradialis 2/2,  hoffmans negative. Patella 2/2, achilles 2/2,   Gait: Narrow based, symmetric arm swing, no gait assist    Labs:     Component      Latest Ref Rng 4/1/2024   Glucose      70 - 99 mg/dL 91    Sodium      136 - 145 mmol/L 141    Potassium      3.5 - 5.1 mmol/L 3.8    Chloride      98 - 112 mmol/L 107    Carbon Dioxide, Total      21.0 - 32.0 mmol/L 29.0    ANION GAP      0 - 18 mmol/L 5    BUN      9 - 23 mg/dL 16    CREATININE      0.55 - 1.02 mg/dL 0.90    CALCIUM      8.5 - 10.1 mg/dL 9.2    CALCULATED OSMOLALITY      275 - 295 mOsm/kg 293    EGFR      >=60 mL/min/1.73m2 68    Patient Fasting for BMP? Yes          Imaging:  PET body 2023  HEAD AND NECK:  There is physiologic uptake in parapharyngeal tonsillar tissue.     CONCLUSION:    1. Lung nodule described on recent chest CT does not demonstrate any significant metabolic activity and is more likely inflammatory or related to mucous plugging in small airway.   2. Moderate to marked focal activity is noted and associated with the anal sphincter complex.  Correlation with any clinical findings in this area is necessary.     Assessment:  Silvia Rodríguez is a(n) 74 year old female benign left breast mass (sp excision), pulmonary nodule (surveillance per pulmonology), PET positive anal lesions (GI w reassuring workup, mri unrevealing), hypertension, pericardial effusion (TTE pending) who presents for right lower facial paresthesias.Started 2/2024, increasing in frequency, more severe episodes intermittently provoked by brushing teeth, talking. Neurologic exam unrevealing. PET body 9/2023 without abnormalities in head and neck per report. Semiologically sounds consistent with right trigeminal neuralgia, will pursue imaging to assess for secondary cause. Discussed oxcarbazepine for symptomatic management, she would like to hold off for now.    Plan:  MRI brain with/without contrast, MRA head  Read about oxcarbazepine/trileptal if interested in starting med for symptoms    1.  Facial pain  - MRI BRAIN (W+WO) (CPT=70553) [[id number]]; Future  - z [facility] MRA BRAIN (CPT=70544); Future  - MRI BRAIN (W+WO) (CPT=70553) [[id number]]  - z [facility] MRA BRAIN (CPT=70544)  - diazePAM 2 MG Oral Tab; Take 1 tablet (2 mg total) by mouth as needed for Anxiety (Take one tab (2 mg) 30 minutes prior to MRI for anxiety, may repeat once during MRI if needed. Do not drive after taking for remainder of day/get a ride home.).  Dispense: 2 tablet; Refill: 0    2. Paresthesias  - MRI BRAIN (W+WO) (CPT=70553) [[id number]]; Future  - z [facility] MRA BRAIN (CPT=70544); Future  - MRI BRAIN (W+WO) (CPT=70553) [[id number]]  - z [facility] MRA BRAIN (CPT=70544)  - diazePAM 2 MG Oral Tab; Take 1 tablet (2 mg total) by mouth as needed for Anxiety (Take one tab (2 mg) 30 minutes prior to MRI for anxiety, may repeat once during MRI if needed. Do not drive after taking for remainder of day/get a ride home.).  Dispense: 2 tablet; Refill: 0    3. Trigeminal neuralgia of right side of face  - MRI BRAIN (W+WO) (CPT=70553) [[id number]]; Future  - z [facility] MRA BRAIN (CPT=70544); Future  - MRI BRAIN (W+WO) (CPT=70553) [[id number]]  - z [facility] MRA BRAIN (CPT=70544)  - diazePAM 2 MG Oral Tab; Take 1 tablet (2 mg total) by mouth as needed for Anxiety (Take one tab (2 mg) 30 minutes prior to MRI for anxiety, may repeat once during MRI if needed. Do not drive after taking for remainder of day/get a ride home.).  Dispense: 2 tablet; Refill: 0    Total time 48 minutes including chart review, eliciting history, physical exam, and counseling.     Risa Casillas, DO

## 2024-07-05 NOTE — TELEPHONE ENCOUNTER
Diazepam approved 1/1/2024-8/4/2024.  Approval letter faxed to [facility] with fax confirmation received.

## 2024-07-08 ENCOUNTER — TELEPHONE (OUTPATIENT)
Dept: NEUROLOGY | Facility: CLINIC | Age: 74
End: 2024-07-08

## 2024-07-08 DIAGNOSIS — R51.9 FACIAL PAIN: ICD-10-CM

## 2024-07-08 DIAGNOSIS — G50.0 TRIGEMINAL NEURALGIA OF RIGHT SIDE OF FACE: ICD-10-CM

## 2024-07-08 DIAGNOSIS — R20.2 PARESTHESIAS: ICD-10-CM

## 2024-07-08 NOTE — TELEPHONE ENCOUNTER
Patient states provider approved Diazepam for her imaging tests (MRI and MRA).    Tests will be done on separate days due to insurance coverage.  Only has enough for one (set for 07/17)    Will need another dose for following test (set for 07/18)    Forwarded to nurses

## 2024-07-09 RX ORDER — DIAZEPAM 2 MG/1
2 TABLET ORAL AS NEEDED
Qty: 2 TABLET | Refills: 0 | Status: SHIPPED | OUTPATIENT
Start: 2024-07-09

## 2024-07-18 ENCOUNTER — PATIENT MESSAGE (OUTPATIENT)
Dept: NEUROLOGY | Facility: CLINIC | Age: 74
End: 2024-07-18

## 2024-07-19 ENCOUNTER — PATIENT MESSAGE (OUTPATIENT)
Dept: NEUROLOGY | Facility: CLINIC | Age: 74
End: 2024-07-19

## 2024-07-19 ENCOUNTER — TELEPHONE (OUTPATIENT)
Dept: NEUROLOGY | Facility: CLINIC | Age: 74
End: 2024-07-19

## 2024-07-19 DIAGNOSIS — D32.9 MENINGIOMA (HCC): Primary | ICD-10-CM

## 2024-07-19 NOTE — TELEPHONE ENCOUNTER
Spoke to Silvia re imaging. Small meningioma, will image 3-6 months. Risa Casillas, DO       Exam: MRI BRAIN WW/O CONTRAST   CPT Code(s): 92530,GDV75 - MRI BRAIN STEM WO  and  W DYE,Gadavist 7.5 ML Vial     COMPARISON: None.     FINDINGS:   There is a shallow broad dural based homogeneously enhancing mass overlying the left frontoparietal region near the high convexity measuring 7 x 10 x 4 mm (AP by transverse by cc). No significant mass effect on the adjacent brain parenchyma is   identified. There is no midline shift. There are several small nonenhancing foci of T2/FLAIR signal hyperintensity in the deep white matter the frontal lobes bilaterally. No evidence of acute ischemia/infarct is identified on diffusion-weighted images.   No intra or extra-axial hemorrhage, Chiari malformation or pituitary abnormality is identified.     The orbits, retro-orbital structures, internal auditory canals and visualized portions of the trigeminal nerves are grossly unremarkable. Flow void is seen in the superior sagittal sinus and major intracranial arteries centrally, indicating gross   patency. There is mucosal thickening in the maxillary, ethmoid and left frontal sinuses. The mastoid air cells are clear.     IMPRESSION:   1. Mild white matter signal alteration in the frontal lobes is somewhat nonspecific but likely reflects chronic microvascular ischemic changes. No acute intracranial pathology is seen.   2. Findings consistent with a small meningioma in the left frontoparietal region near the high convexity.   3. Minimal to mild paranasal sinus disease, likely chronic.     This report was performed utilizing speech recognition software technology. Despite thorough proofreading, speech recognition errors could escape detection. If a word or phrase is confusing or out of context, please do not hesitate to call for   clarification.     Interpreting Radiologist:     Brian Greenberg M.D.   Electronically Signed: 07/17/2024 05:50 PM          Exam: MRA BRAIN W/O CONTRAST   CPT Code(s): 13053 - MR ANGIOGRAPHY HEAD W/O DYE     CLINICAL HISTORY: Facial pain (R51.9), paresthesias (R20.2) and trigeminal neuralgia (G50.0).     COMPARISON: 7/17/2024 MRI brain is reviewed     TECHNIQUE: Ultrahigh field 3.0 Lola brain MRA without contrast. 3-D rendered images were performed.     FINDINGS:   The anterior, middle and posterior circulations are unremarkable. Hypoplastic posterior communicating arteries, more pronounced in the left, are noted. No evidence of aneurysm formation, arteriovenous malformation or hemodynamically significant stenosis   is identified.     IMPRESSION:   Unremarkable MR angiogram of the brain.

## 2024-07-19 NOTE — TELEPHONE ENCOUNTER
From: Silvia Rodríguez  To: Risa Casillas  Sent: 7/18/2024 4:44 PM CDT  Subject: Discs    I’ve completed the MRI and MRA and I have two discs with the results. Do I need to get these to your office? Or do you get them electronically?  I’m anxious to hear the findings.   Thank you  Silvia Rodríguez  [phone]

## 2024-07-23 ENCOUNTER — TELEPHONE (OUTPATIENT)
Dept: NEUROLOGY | Facility: CLINIC | Age: 74
End: 2024-07-23

## 2024-07-23 DIAGNOSIS — R20.2 PARESTHESIAS: ICD-10-CM

## 2024-07-23 DIAGNOSIS — R51.9 FACIAL PAIN: ICD-10-CM

## 2024-07-23 DIAGNOSIS — G50.0 TRIGEMINAL NEURALGIA OF RIGHT SIDE OF FACE: ICD-10-CM

## 2024-07-23 RX ORDER — OXCARBAZEPINE 300 MG/1
TABLET, FILM COATED ORAL
Qty: 60 TABLET | Refills: 11 | Status: SHIPPED | OUTPATIENT
Start: 2024-07-23

## 2024-07-23 NOTE — TELEPHONE ENCOUNTER
Spoke to Silvia galeano suspicion that she has primary trigeminal neuralgia without structural cause on imaging. Discussed oxcarbazepine again as possible med for symptomatic management, she will consider. She asked that I send order to pharmacy. Discussed potential for some dizziness, drowsiness. Discussed need to omonitor for lab derangements. Will order pending CBC, BMP, TSH to be drawn 3 months after starting med if she pursues. Asked that she let me know if she starts med, and that she let me know if new sxs arise.     Risa Casillas, DO

## 2024-08-05 ENCOUNTER — TELEPHONE (OUTPATIENT)
Dept: INTERNAL MEDICINE CLINIC | Facility: CLINIC | Age: 74
End: 2024-08-05

## 2024-08-05 DIAGNOSIS — R68.84 JAW PAIN: Primary | ICD-10-CM

## 2024-08-05 NOTE — TELEPHONE ENCOUNTER
Patient stated she saw Dr. Risa Suggs dr that Dr. Abena Ayala referred her to.  Patient added got results and patient has concerns about them and wants a second opinion.    Patient is hoping to get referral for the 2nd opinion with Dr. Bernabe Guardado and they're in [facility].  [facility], ph [phone].  Patient stated she saw this doctor 20 years for head injury and knows him and wants to go back to him.  Patient added he's under her plan, just needs a referral.  No appts set    FYI:    Silvia Rodríguez  Called for a referral for a NEW concern of 2nd opinion.     Patient denied appointment with primary.  Routing to triage for assistance    Patient requesting referral to Dr. Bernabe Guardado   Specialty: Neuro  Does patient have a scheduled appointment?: no

## 2024-08-05 NOTE — TELEPHONE ENCOUNTER
Upon search on internet for correct information:     [address] [address] / [place] [address]  Phone #: [phone]    LOV 4/15/24   AMS - Pended referral if agreeable

## 2024-08-07 NOTE — TELEPHONE ENCOUNTER
Pt calling checking status on referral. Referral Faxed to number below pt provided.    Fax [phone].

## 2024-08-08 NOTE — TELEPHONE ENCOUNTER
Spoke to patient to ensure fax number is for Dr Bernabe Guardado's office.  Patient indicates the fax number given is correct for his office.  Confirmation received.

## 2024-09-04 RX ORDER — AMLODIPINE BESYLATE 5 MG/1
5 TABLET ORAL DAILY
Qty: 90 TABLET | Refills: 3 | Status: SHIPPED | OUTPATIENT
Start: 2024-09-04

## 2024-09-04 NOTE — TELEPHONE ENCOUNTER
REFILL PASSED PER [facility] PROTOCOLS    Requested Prescriptions   Pending Prescriptions Disp Refills    AMLODIPINE 5 MG Oral Tab [Pharmacy Med Name: AMLODIPINE BESYLATE 5 MG TAB] 90 tablet 1     Sig: Take 1 tablet (5 mg total) by mouth daily.       Hypertension Medications Protocol Passed - 9/1/2024  1:15 PM        Passed - CMP or BMP in past 12 months        Passed - Last BP reading less than 140/90     BP Readings from Last 1 Encounters:   07/05/24 118/74               Passed - In person appointment or virtual visit in the past 12 mos or appointment in next 3 mos     Recent Outpatient Visits              2 months ago Facial pain    [facility] Risa Casillas DO    Office Visit    4 months ago Atypical lobular hyperplasia (ALH) of left breast    [facility] Sylvia Joshi MD    Office Visit    4 months ago Mass of left breast, unspecified quadrant    [facility] Kassie Oliver APRN    Office Visit    4 months ago Pericardial effusion (HCC)    [facility] Liv Perez MD    Office Visit    4 months ago Preoperative examination    [address] Abena Ayala DO    Office Visit          Future Appointments         Provider Department Appt Notes    In 2 weeks  CARD ECHO RM 1 [facility] Cardiodiagnostics     In 4 weeks Liv Perez MD [facility] 6 mon f/u    In 1 month Risa Casillas DO [facility] 3 month follow up    In 1 month  CT MAIN RM3 [facility] CT Pulmonary nodule    In 1 month  MR RM2 (1.5T WIDE) [facility] MRI     In 2 months Abena Ayala DO [address]  last maw 11/18/2023    In 7 months [facility] RM3  [facility] Mammography Last diagnostic mammo 4/1/2024 - [facility]                    Passed - EGFRCR or GFRNAA > 50     GFR Evaluation  EGFRCR: 68 , resulted on 4/1/2024               Future Appointments         Provider Department Appt Notes    In 2 weeks  CARD ECHO RM 1 [facility] Cardiodiagnostics     In 4 weeks Liv Perez MD [facility] 6 mon f/u    In 1 month Rsia Casillas DO [facility] 3 month follow up    In 1 month  CT MAIN RM3 [facility] CT Pulmonary nodule    In 1 month  MR RM2 (1.5T WIDE) [facility] MRI     In 2 months Abena Ayala DO [facility], [address] [place] [address]  last maw 11/18/2023    In 7 months  SHREE RM3 [facility] Mammography Last diagnostic mammo 4/1/2024 - [facility]          Recent Outpatient Visits              2 months ago Facial pain    [facility] Risa Casillas DO    Office Visit    4 months ago Atypical lobular hyperplasia (ALH) of left breast    [facility] Sylvia Joshi MD    Office Visit    4 months ago Mass of left breast, unspecified quadrant    [facility] Kassie Oliver APRN    Office Visit    4 months ago Pericardial effusion (HCC)    [facility] Liv Perez MD    Office Visit    4 months ago Preoperative examination    [facility], [address] [place] [address] Abena Ayala DO    Office Visit

## 2024-09-23 ENCOUNTER — HOSPITAL ENCOUNTER (OUTPATIENT)
Dept: CV DIAGNOSTICS | Facility: HOSPITAL | Age: 74
Discharge: HOME | End: 2024-09-23
Attending: INTERNAL MEDICINE
Payer: MEDICARE

## 2024-09-23 DIAGNOSIS — I31.39 PERICARDIAL EFFUSION (HCC): ICD-10-CM

## 2024-09-23 PROCEDURE — 93306 TTE W/DOPPLER COMPLETE: CPT | Performed by: INTERNAL MEDICINE

## 2024-10-02 ENCOUNTER — OFFICE VISIT (OUTPATIENT)
Facility: CLINIC | Age: 74
End: 2024-10-02
Payer: MEDICARE

## 2024-10-02 VITALS
HEART RATE: 74 BPM | RESPIRATION RATE: 16 BRPM | WEIGHT: 140 LBS | BODY MASS INDEX: 27.48 KG/M2 | DIASTOLIC BLOOD PRESSURE: 64 MMHG | OXYGEN SATURATION: 99 % | HEIGHT: 60 IN | SYSTOLIC BLOOD PRESSURE: 114 MMHG

## 2024-10-02 DIAGNOSIS — G25.81 RLS (RESTLESS LEGS SYNDROME): Primary | ICD-10-CM

## 2024-10-02 DIAGNOSIS — R79.89 LOW SERUM THYROID STIMULATING HORMONE (TSH): ICD-10-CM

## 2024-10-02 DIAGNOSIS — E03.9 HYPOTHYROIDISM, UNSPECIFIED TYPE: ICD-10-CM

## 2024-10-02 PROCEDURE — 99214 OFFICE O/P EST MOD 30 MIN: CPT | Performed by: INTERNAL MEDICINE

## 2024-10-02 NOTE — PATIENT INSTRUCTIONS
Plan-- plan to call for CT results           - to get blood work for restless leg as well           - to exercise as discussed            - consider magnesium glycinate 400mg  every evening           - to get overnight oximeter - call in 2 weeks after testing          - see me in 4  months             -    Liv Perez MD  Pulmonary Medicine  10/2/2024

## 2024-10-02 NOTE — PROGRESS NOTES
Pulmonary Consult Note    History of Present Illness:  Silvia Rodríguez is a 74 year old female presenting to pulmonary clinic today for pulmonary nodule  Had scans for 2 yrs - for GI issues - ulcers and herna - found to have nodules - - 6 month follow up - with sl increase   Tried to be kidney donor- rejected with less kidney function-   Never smoked - secondhand - growing up- dad  from lung cancer   No hx asthma or COPD_ -   Cathi-   Asa and NSAID hives- as adult age 20s - no asthma   No illness or hx of sickness as a kid   No regular exercise     - following with surgeon- revisit in April-   No issues -   Saw GI-- with polyps removed adenomas  No issues - feels well- saw dr king without change   Feels fine-   No coughing - no shortness of breath    Off hydrochlorothiazide now   Out of breath with the stairs - since started hydrochlorothiazide so now off and better - no dyspnea now at all     - saw GI-- found large polyps - not the cause -   Saw dr espinoza- and had 6 month followup- - for MRI--  -no issues   Feels good- - partner - had transplant -- then needed stents -   No dyspnea at all - 2-3 flights of stairs feels sl winded- at most   No coughing -- no mucus   No cp   Denies recent viral infection  10/24- found meningoma- and not causing pain-- lower right jaw pain- with spitting out or warm water- no meds- comes and goes   Trigeminal neuralgia  No gi issues - no further studies recommended - found nothing to explain PET scan -   [employer]- once a year  mamms   No dyspnea - no exercise htough stairs at home   No coughing                    Past Medical History:   Past Medical History:    Abdominal distention    Abdominal hernia    Abdominal pain    Also 2021    Allergic rhinitis    Arthritis    Fingers    Belching    Bloating    Body piercing    Constipation    Decorative tattoo    Easy bruising    Essential hypertension    Flatulence/gas pain/belching    Food intolerance    Milk, ice cream     Headache disorder    Several times per week    Heart palpitations    Occasionally    Hemorrhoids    High blood pressure    History of stomach ulcers    Irregular bowel habits    Nausea    Presence of other cardiac implants and grafts    [place] in right toe    Sleep disturbance    Uncomfortable fullness after meals    Visual impairment    glaases    Wears glasses    Glasses    Recent HTN- - when renal donor eval  Hx ulcers 2 yrs ago -- HH - - now off PPI -  No reflux or heartburn-   No cancers   No clots -   No AFSANEH-     Past Surgical History:   Past Surgical History:   Procedure Laterality Date    Arthroscopy of joint unlisted Right     knee    Colonoscopy  2015    normal; repeat 10 yrs    Colonoscopy      Every 10 years    Colonoscopy,diagnostic N/A 2015    Procedure: COLONOSCOPY, POSSIBLE BIOPSY, POSSIBLE POLYPECTOMY 90221;  Surgeon: Kevin Vivas MD;  [facility]    D & c  [facility]    Needle biopsy left  2023    Stromal fibrosis      Aug. 1972    Other surgical history      bilateral feet sx-local    Tubal ligation  1981       Family Medical History:   Family History   Problem Relation Age of Onset    Cancer Mother         Breast cancer    Breast Cancer Mother 75        estimated age    Cancer Father         Lung cancer    Dementia Sister         Alzheimers    Diabetes Sister         Alzheimers    Diabetes Brother     Other (vagina or uterine cancer) Maternal Grandmother     Ear Problems Neg     Allergies Neg     Bleeding Disorders Neg     Clotting Disorder Neg     Thyroid disease Neg     Hypertension Neg     Asthma Neg     Arthritis Neg     Anesthesia Problems  Neg        Social History: Social History    Socioeconomic History      Marital status: Life Partner    Tobacco Use      Smoking status: Never      Smokeless tobacco: Never      Tobacco comments: Job:  Works at [employer]    Vaping Use      Vaping Use: Never used    Substance and Sexual Activity      Alcohol  use: No      Drug use: Never  Retired- realtor and [employer] -   No pets       Allergies: Aspirin, Hydrocodone, and Nsaids     Medications:   Current Outpatient Medications   Medication Sig Dispense Refill    amLODIPine 5 MG Oral Tab Take 1 tablet (5 mg total) by mouth daily. 90 tablet 3    OXcarbazepine 300 MG Oral Tab Half tab at bedtime x 1 week, then Half tab twice a day x 1 week, then Half in AM and One tab at bedtime x 1 week, then One tab twice a day (Patient not taking: Reported on 10/2/2024) 60 tablet 11       Review of Systems: weight - stable - sl up - stable   No swallowing issues at all   No sore throat   No skin lesions or hives or rashes --  Had breast bx- on Friday - first bx- - was neg - for US in am - neg bx-- now growing-- for removal -- 4/26 -- grizek - for yearly   No allergies or sinus issues   No gerd - some burping -hx  ulcer and HH - still with burping - no ppi - or meds   Sleeping - mind races better now- 4 hours - nocturia -not a great sleeper -   Pain in rt jaw-- as above   Hard to get to sleep - melatonin - benadryl - awakens at 3 and is awake - thinks has RLS - constantly moving - needs to move legs - watching TV and while in bed     Physical Exam:  /64   Pulse 74   Resp 16   Ht 5' (1.524 m)   Wt 140 lb (63.5 kg)   SpO2 99%   BMI 27.34 kg/m²    Constitutional: comfortable . No acute distress.   HEENT: Head NC/AT. PEERL. Throat is clear no lesions   Cardio: Regular rate and rhythm. Normal S1 and S2. No murmurs. No ectopy no rub  Respiratory: Thorax symmetrical with no labored breathing. . No wheezing, rhonchi, rales, or crackles.   GI:  Abd soft, non-tender.  Extremities: No clubbing or cyanosis. No LE edema. No calf tenderness.VV  Neurologic: A&Ox3. No gross motor deficits.  Skin: Warm, dry.no rashes or hives noted   Lymphatic: No cervical or supraclavicular lymphadenopathy.no jvd   Psych: Calm, cooperative. Pleasant affect.    Results:  Reviewed      Assessment/Plan:  # pulmonary nodule   Right lower lobe-- endobronchial-  Reviewed with radiology-increased in size and was present April 2022 and February 23--plan to proceed with PET scan  12/23 PET scan negative for uptake with the exception of the anal sphincter,  12.23 for repeat at 6 months   4/24- mucus plug now smaller -- for follow up -at 6 months   No evidence of broncholith noted  10/24- due for CT schedule      # Pet -Positive anal lesions  Saw GI and is following with surgery-polyps grade 2 internal hemorrhoids and external anal skin tags  4/24- multip[le evals with polyps found more proximally- not explaining with plans for MRI   10.24- no issues - scope in 3 yrs - 3 polyps found         #History of ulcers and hiatal hernia  Denies any signs or symptoms of aspiration/recurrent GERD  Hx bacterial with breath - treated - recently off bactrim - tried xifaxan - too expensive - then bactrim-- no ppi -initiated for bad breath SOM      #Borderline hypertension  Recent treatment-initially for hopes for kidney donor    # pericardial effusion - sl increase on ct   To check echo - no sx   10/24- echo with trivial 9/24 - no issues     # Atypical lobular hyperplasia excisional biopsy 4/26/2024  Following with Dr. Kimball    # RLS   Mom with AFSANEH-- plan for overnight and blood work       Plan-- plan to call for CT results           - to get blood work for restless leg as well           - to exercise as discussed            - consider magnesium glycinate 400mg  every evening           - to get overnight oximeter - call in 2 weeks after testing          - see me in 4  months             -    Liv Perez MD  Pulmonary Medicine  10/2/2024

## 2024-10-03 ENCOUNTER — LAB ENCOUNTER (OUTPATIENT)
Dept: LAB | Facility: HOSPITAL | Age: 74
End: 2024-10-03
Attending: INTERNAL MEDICINE
Payer: MEDICARE

## 2024-10-03 DIAGNOSIS — G25.81 RLS (RESTLESS LEGS SYNDROME): ICD-10-CM

## 2024-10-03 DIAGNOSIS — E03.9 HYPOTHYROIDISM, UNSPECIFIED TYPE: ICD-10-CM

## 2024-10-03 DIAGNOSIS — R79.89 LOW SERUM THYROID STIMULATING HORMONE (TSH): ICD-10-CM

## 2024-10-03 LAB
DEPRECATED HBV CORE AB SER IA-ACNC: 195 NG/ML
TSI SER-ACNC: 1.19 MIU/ML (ref 0.55–4.78)

## 2024-10-03 PROCEDURE — 84443 ASSAY THYROID STIM HORMONE: CPT

## 2024-10-03 PROCEDURE — 82728 ASSAY OF FERRITIN: CPT

## 2024-10-03 PROCEDURE — 36415 COLL VENOUS BLD VENIPUNCTURE: CPT

## 2024-10-07 ENCOUNTER — LAB ENCOUNTER (OUTPATIENT)
Dept: LAB | Facility: HOSPITAL | Age: 74
End: 2024-10-07
Attending: STUDENT IN AN ORGANIZED HEALTH CARE EDUCATION/TRAINING PROGRAM
Payer: MEDICARE

## 2024-10-07 DIAGNOSIS — G50.0 TRIGEMINAL NEURALGIA OF RIGHT SIDE OF FACE: ICD-10-CM

## 2024-10-07 DIAGNOSIS — R20.2 PARESTHESIAS: ICD-10-CM

## 2024-10-07 DIAGNOSIS — R51.9 FACIAL PAIN: ICD-10-CM

## 2024-10-07 LAB
ANION GAP SERPL CALC-SCNC: 5 MMOL/L (ref 0–18)
BASOPHILS # BLD AUTO: 0.05 X10(3) UL (ref 0–0.2)
BASOPHILS NFR BLD AUTO: 1.2 %
BUN BLD-MCNC: 16 MG/DL (ref 9–23)
CALCIUM BLD-MCNC: 9.7 MG/DL (ref 8.7–10.4)
CHLORIDE SERPL-SCNC: 106 MMOL/L (ref 98–112)
CO2 SERPL-SCNC: 30 MMOL/L (ref 21–32)
CREAT BLD-MCNC: 0.83 MG/DL
EGFRCR SERPLBLD CKD-EPI 2021: 74 ML/MIN/1.73M2 (ref 60–?)
EOSINOPHIL # BLD AUTO: 0.15 X10(3) UL (ref 0–0.7)
EOSINOPHIL NFR BLD AUTO: 3.6 %
ERYTHROCYTE [DISTWIDTH] IN BLOOD BY AUTOMATED COUNT: 12.4 %
FASTING STATUS PATIENT QL REPORTED: YES
GLUCOSE BLD-MCNC: 95 MG/DL (ref 70–99)
HCT VFR BLD AUTO: 36 %
HGB BLD-MCNC: 12.2 G/DL
IMM GRANULOCYTES # BLD AUTO: 0.01 X10(3) UL (ref 0–1)
IMM GRANULOCYTES NFR BLD: 0.2 %
LYMPHOCYTES # BLD AUTO: 1.01 X10(3) UL (ref 1–4)
LYMPHOCYTES NFR BLD AUTO: 24 %
MCH RBC QN AUTO: 29.9 PG (ref 26–34)
MCHC RBC AUTO-ENTMCNC: 33.9 G/DL (ref 31–37)
MCV RBC AUTO: 88.2 FL
MONOCYTES # BLD AUTO: 0.36 X10(3) UL (ref 0.1–1)
MONOCYTES NFR BLD AUTO: 8.6 %
NEUTROPHILS # BLD AUTO: 2.62 X10 (3) UL (ref 1.5–7.7)
NEUTROPHILS # BLD AUTO: 2.62 X10(3) UL (ref 1.5–7.7)
NEUTROPHILS NFR BLD AUTO: 62.4 %
OSMOLALITY SERPL CALC.SUM OF ELEC: 293 MOSM/KG (ref 275–295)
PLATELET # BLD AUTO: 158 10(3)UL (ref 150–450)
POTASSIUM SERPL-SCNC: 4.7 MMOL/L (ref 3.5–5.1)
RBC # BLD AUTO: 4.08 X10(6)UL
SODIUM SERPL-SCNC: 141 MMOL/L (ref 136–145)
TSI SER-ACNC: 1.35 MIU/ML (ref 0.55–4.78)
WBC # BLD AUTO: 4.2 X10(3) UL (ref 4–11)

## 2024-10-07 PROCEDURE — 36415 COLL VENOUS BLD VENIPUNCTURE: CPT

## 2024-10-07 PROCEDURE — 80048 BASIC METABOLIC PNL TOTAL CA: CPT

## 2024-10-07 PROCEDURE — 85025 COMPLETE CBC W/AUTO DIFF WBC: CPT

## 2024-10-07 PROCEDURE — 84443 ASSAY THYROID STIM HORMONE: CPT

## 2024-10-09 ENCOUNTER — OFFICE VISIT (OUTPATIENT)
Dept: NEUROLOGY | Facility: CLINIC | Age: 74
End: 2024-10-09
Payer: MEDICARE

## 2024-10-09 VITALS
DIASTOLIC BLOOD PRESSURE: 60 MMHG | BODY MASS INDEX: 27.09 KG/M2 | SYSTOLIC BLOOD PRESSURE: 114 MMHG | RESPIRATION RATE: 16 BRPM | HEART RATE: 79 BPM | OXYGEN SATURATION: 95 % | WEIGHT: 138 LBS | HEIGHT: 60 IN

## 2024-10-09 DIAGNOSIS — D32.9 MENINGIOMA (HCC): ICD-10-CM

## 2024-10-09 DIAGNOSIS — G50.0 TRIGEMINAL NEURALGIA OF RIGHT SIDE OF FACE: Primary | ICD-10-CM

## 2024-10-09 PROCEDURE — 99214 OFFICE O/P EST MOD 30 MIN: CPT | Performed by: STUDENT IN AN ORGANIZED HEALTH CARE EDUCATION/TRAINING PROGRAM

## 2024-10-09 NOTE — PATIENT INSTRUCTIONS
-MRI brain as scheduled  -Let me know if you want to trial oxcarbazepine  -See PCP doctor for ear pain and eye discharge    Refill policies:    Allow 2-3 business days for refills; controlled substances may take longer.  Contact your pharmacy at least 5 days prior to running out of medication and have them send an electronic request or submit request through the “request refill” option in your [place] [employer] account.  Refills are not addressed on weekends; covering physicians do not authorize routine medications on weekends.  No narcotics or controlled substances are refilled after noon on Fridays or by on call physicians.  By law, narcotics must be electronically prescribed.  A 30 day supply with no refills is the maximum allowed.  If your prescription is due for a refill, you may be due for a follow up appointment.  To best provide you care, patients receiving routine medications need to be seen at least once a year.  Patients receiving narcotic/controlled substance medications need to be seen at least once every 3 months.  In the event that your preferred pharmacy does not have the requested medication in stock (e.g. Backordered), it is your responsibility to find another pharmacy that has the requested medication available.  We will gladly send a new prescription to that pharmacy at your request.    Scheduling Tests:    If your physician has ordered radiology tests such as MRI or CT scans, please contact Central Scheduling at [phone] right away to schedule the test.  Once scheduled, the [facility] Centralized Referral Team will work with your insurance carrier to obtain pre-certification or prior authorization.  Depending on your insurance carrier, approval may take 3-10 days.  It is highly recommended patients assure they have received an authorization before having a test performed.  If test is done without insurance authorization, patient may be responsible for the entire amount billed.      Precertification and Prior  Authorizations:  If your physician has recommended that you have a procedure or additional testing performed the [facility] Centralized Referral Team will contact your insurance carrier to obtain pre-certification or prior authorization.    You are strongly encouraged to contact your insurance carrier to verify that your procedure/test has been approved and is a COVERED benefit.  Although the [facility] Centralized Referral Team does its due diligence, the insurance carrier gives the disclaimer that \"Although the procedure is authorized, this does not guarantee payment.\"    Ultimately the patient is responsible for payment.   Thank you for your understanding in this matter.  Paperwork Completion:  If you require FMLA or disability paperwork for your recovery, please make sure to either drop it off or have it faxed to our office at [phone]. Be sure the form has your name and date of birth on it.  The form will be faxed to our Forms Department and they will complete it for you.  There is a 25$ fee for all forms that need to be filled out.  Please be aware there is a 10-14 day turnaround time.  You will need to sign a release of information (ELIJAH) form if your paperwork does not come with one.  You may call the Forms Department with any questions at [phone].  Their fax number is [phone].

## 2024-10-09 NOTE — PROGRESS NOTES
Neurology Office Visit    Silvia Rodríguez   Date of Birth 1/22/1950    Interval history October 09, 2024:   Getting repeat MRI brain to reassess meningioma. Saw neurosurgeon for another opinion who felt meningioma incidental and advised surveillance imaging.     Reports left ear pain episodic, no clear provoker, doesn't travel anywhere. No hearing issues or feelings of fullness in ear. Feels like she is getting an earache. Episodes last for minutes 5-30 minutes, no clear trigger at onset. Happening sporadically. Estimates it's been going on for a couple years. Reports intermittently left eye will have a fair amount of drainage more than the right.     She reports the facial pain continues to come and go, similar to prior. Not currently interested in starting oxcarbazepine.     Problem List:  Patient Active Problem List   Diagnosis    Gastritis    Hiatal hernia    Abnormal findings on diagnostic imaging of digestive system    Benign neoplasm of transverse colon    Benign neoplasm of sigmoid colon    Diverticulosis    Internal hemorrhoids    Anorectal skin tags    Mass of left breast    Atypical lobular hyperplasia (ALH) of left breast       PMHx:  Past Medical History:    Abdominal distention    Abdominal hernia    Abdominal pain    Also 8/27/2021    Allergic rhinitis    Arthritis    Fingers    Belching    Bloating    Body piercing    Constipation    Decorative tattoo    Easy bruising    Essential hypertension    Flatulence/gas pain/belching    Food intolerance    Milk, ice cream    Headache disorder    Several times per week    Heart palpitations    Occasionally    Hemorrhoids    High blood pressure    History of stomach ulcers    Irregular bowel habits    Nausea    Presence of other cardiac implants and grafts    [place] in right toe    Sleep disturbance    Uncomfortable fullness after meals    Visual impairment    glaases    Wears glasses    Glasses       PSHx:  Past Surgical History:   Procedure Laterality Date     Arthroscopy of joint unlisted Right     knee    Colonoscopy  2015    normal; repeat 10 yrs    Colonoscopy      Every 10 years    Colonoscopy,diagnostic N/A 2015    Procedure: COLONOSCOPY, POSSIBLE BIOPSY, POSSIBLE POLYPECTOMY 76153;  Surgeon: Kevin Vivas MD;  [facility]    D & c  1971    Needle biopsy left  2023    Stromal fibrosis      Aug. 1972    Other surgical history      bilateral feet sx-local    Tubal ligation         SocHx:  Social History     Socioeconomic History    Marital status: Life Partner   Tobacco Use    Smoking status: Never     Passive exposure: Past (both parents smoked in home when patient was a child)    Smokeless tobacco: Never    Tobacco comments:     Job:  Works at [employer]   Vaping Use    Vaping status: Never Used   Substance and Sexual Activity    Alcohol use: No    Drug use: Never   Other Topics Concern    Caffeine Concern Yes     Comment: Coffee    Exercise No    Seat Belt No    Special Diet No    Stress Concern No    Weight Concern No       Family History:  Family History   Problem Relation Age of Onset    Cancer Mother         Breast cancer    Breast Cancer Mother 75        estimated age    Cancer Father         Lung cancer    Dementia Sister         Alzheimers    Diabetes Sister         Alzheimers    Diabetes Brother     Other (vagina or uterine cancer) Maternal Grandmother     Ear Problems Neg     Allergies Neg     Bleeding Disorders Neg     Clotting Disorder Neg     Thyroid disease Neg     Hypertension Neg     Asthma Neg     Arthritis Neg     Anesthesia Problems  Neg        Current Medications:  Current Outpatient Medications   Medication Sig Dispense Refill    amLODIPine 5 MG Oral Tab Take 1 tablet (5 mg total) by mouth daily. 90 tablet 3       ROS:  No big changes in weight recently, no blood in stool, no night sweats, no chills.     Objective/Physical Exam:    Vital Signs:  Blood pressure 114/60, pulse 79, resp. rate  16, height 60\", weight 138 lb (62.6 kg), SpO2 95%.  General: Alert, good recall of personal medical history    Respiratory: Non labored breathing on room air  Ear: tympanic membranes appear intact, no significant wax  Cardiovascular: Regular rate    Mental status: Alert, oriented, language fluent, comprehension intact    Cranial nerves: Optic disc margins sharp VF full to confrontation bilaterally. Pupils equal, round, equally reactive to light and accommodation. Extraocular eye movements intact without nystagmus. Face sensation intact to light touch. Face strength symmetric and intact. No dysarthria.    Motor:   Power:   -Right and left  upper extremity: shoulder abductors 5  -Right and left lower extremity: hip flexion 5  Tone: Normal   Bulk: Normal  Abnormal movements: None    Sensory: Intact to light touch in distal extremities.    Coordination: Finger to nose and heel to shin intact.    Reflexes: Right/Left: Biceps 2/2, brachioradialis 2/2, hoffmans negative. Patella 2/2, achilles 2/2,   Gait: Narrow based, symmetric arm swing, no gait assist    Labs:     Component      Latest Ref Rng 4/1/2024   Glucose      70 - 99 mg/dL 91    Sodium      136 - 145 mmol/L 141    Potassium      3.5 - 5.1 mmol/L 3.8    Chloride      98 - 112 mmol/L 107    Carbon Dioxide, Total      21.0 - 32.0 mmol/L 29.0    ANION GAP      0 - 18 mmol/L 5    BUN      9 - 23 mg/dL 16    CREATININE      0.55 - 1.02 mg/dL 0.90    CALCIUM      8.5 - 10.1 mg/dL 9.2    CALCULATED OSMOLALITY      275 - 295 mOsm/kg 293    EGFR      >=60 mL/min/1.73m2 68    Patient Fasting for BMP? Yes          Imaging:  PET body 2023  HEAD AND NECK:  There is physiologic uptake in parapharyngeal tonsillar tissue.     CONCLUSION:    1. Lung nodule described on recent chest CT does not demonstrate any significant metabolic activity and is more likely inflammatory or related to mucous plugging in small airway.   2. Moderate to marked focal activity is noted and associated  with the anal sphincter complex.  Correlation with any clinical findings in this area is necessary.     MR brain wwo 7/2024  IMPRESSION:   1. Mild white matter signal alteration in the frontal lobes is somewhat nonspecific but likely reflects chronic microvascular ischemic changes. No acute intracranial pathology is seen.   2. Findings consistent with a small meningioma in the left frontoparietal region near the high convexity.   3. Minimal to mild paranasal sinus disease, likely chronic.     MRA brain wo 7/2024  IMPRESSION:   Unremarkable MR angiogram of the brain.     Assessment:  Silvia Rodríguez is a(n) 74 year old female benign left breast mass (sp excision), pulmonary nodule (surveillance per pulmonology), PET positive anal lesions (GI w reassuring workup, mri unrevealing), borderline hypertension, pericardial effusion (TTE pending) who presents for right lower facial paresthesias provoked by brushing teeth/talking, similar to last visit. Started 2/2024. Neurologic exam unrevealing. PET body 9/2023 without abnormalities in head and neck per report. MRI brain wwo MRA head neck with small left frontoparietal meningioma, MRA head unrevealing. Given unrevealing imaging, headaches felt most consistent with primary right trigeminal neuralgia. Feel that meningioma incidental, she is getting surveillance imaging soon. She is also following with Dr. Bernabe Guardado neurosurgery for meningioma, advised reimaging and if stable annual mris. Discussed oxcarbazepine for symptomatic management, she would like to hold off for now.    Plan:  -MRI brain as scheduled  -Let me know if you want to trial oxcarbazepine  -See PCP doctor for ear pain and eye discharge    1. Trigeminal neuralgia of right side of face    2. Meningioma (HCC)    Total time 30 minutes including chart review, eliciting history, physical exam, and counseling.     Risa Block, DO

## 2024-10-15 ENCOUNTER — HOSPITAL ENCOUNTER (OUTPATIENT)
Dept: CT IMAGING | Facility: HOSPITAL | Age: 74
Discharge: HOME | End: 2024-10-15
Attending: INTERNAL MEDICINE
Payer: MEDICARE

## 2024-10-15 DIAGNOSIS — R91.1 PULMONARY NODULE 1 CM OR GREATER IN DIAMETER: ICD-10-CM

## 2024-10-15 PROCEDURE — 71250 CT THORAX DX C-: CPT | Performed by: INTERNAL MEDICINE

## 2024-10-21 ENCOUNTER — TELEPHONE (OUTPATIENT)
Facility: CLINIC | Age: 74
End: 2024-10-21

## 2024-10-21 DIAGNOSIS — R09.02 HYPOXEMIA: Primary | ICD-10-CM

## 2024-10-21 NOTE — TELEPHONE ENCOUNTER
Spoke with pt and pt states that DME would be [facility].  Order fro [employer] sent to [facility].  Pt provided with number to follow up if she does not hear back from them by Wednesday or Thursday.

## 2024-10-22 ENCOUNTER — TELEPHONE (OUTPATIENT)
Facility: CLINIC | Age: 74
End: 2024-10-22

## 2024-10-22 ENCOUNTER — HOSPITAL ENCOUNTER (OUTPATIENT)
Dept: MRI IMAGING | Facility: HOSPITAL | Age: 74
Discharge: HOME | End: 2024-10-22
Attending: STUDENT IN AN ORGANIZED HEALTH CARE EDUCATION/TRAINING PROGRAM
Payer: MEDICARE

## 2024-10-22 DIAGNOSIS — D32.9 MENINGIOMA (HCC): ICD-10-CM

## 2024-10-22 PROCEDURE — A9575 INJ GADOTERATE MEGLUMI 0.1ML: HCPCS

## 2024-10-22 PROCEDURE — 70553 MRI BRAIN STEM W/O & W/DYE: CPT | Performed by: STUDENT IN AN ORGANIZED HEALTH CARE EDUCATION/TRAINING PROGRAM

## 2024-10-22 RX ORDER — GADOTERATE MEGLUMINE 376.9 MG/ML
20 INJECTION INTRAVENOUS
Status: COMPLETED | OUTPATIENT
Start: 2024-10-22 | End: 2024-10-22

## 2024-10-22 RX ADMIN — GADOTERATE MEGLUMINE 18 ML: 376.9 INJECTION INTRAVENOUS at 09:44:00

## 2024-10-22 NOTE — TELEPHONE ENCOUNTER
Pt was told to do blood work for the after visit connie and there  no order. Pt is at the lab waiting to see what to do next

## 2024-10-23 ENCOUNTER — TELEPHONE (OUTPATIENT)
Dept: NEUROLOGY | Facility: CLINIC | Age: 74
End: 2024-10-23

## 2024-10-23 NOTE — TELEPHONE ENCOUNTER
Patient contacted the office asking to speak with someone regarding her recent MRI that she reviewed through [facility]. She stated that she had the same imaging completed back in July of this year and the current report had verbiage that said \"no comparison\". Please contact the patient to discuss this.

## 2024-10-24 ENCOUNTER — TELEPHONE (OUTPATIENT)
Facility: CLINIC | Age: 74
End: 2024-10-24

## 2024-10-24 NOTE — TELEPHONE ENCOUNTER
[facility] Singleton representative contacted regarding overnight oximetry order. Per patient, hasn't been contacted. Bogdan stated will look into order. Patient called to notify.

## 2024-10-25 NOTE — TELEPHONE ENCOUNTER
Spoke to Silvia, overall appears quite stable, possible trace growth. Will await radiology comparison as well. Repeat ~6m, ordered. Risa Casillas, DO    Detail Level: Detailed Introduction Text (Please End With A Colon): The following procedure was deferred: Instructions (Optional): A right lateral posterior proximal lower leg . 6 x .6 SCC \\nB right lateral posterior distal lower leg . 6 x .6 SCC \\nC left inferior temporal scalp . 8 x .6 LM\\nD left superior lateral forehead . 6 x .6 LM\\nE central forehead. 6 x .6 SCC

## 2024-11-11 DIAGNOSIS — E78.5 HYPERLIPIDEMIA, UNSPECIFIED HYPERLIPIDEMIA TYPE: Primary | ICD-10-CM

## 2024-11-11 DIAGNOSIS — Z00.00 ROUTINE GENERAL MEDICAL EXAMINATION AT A HEALTH CARE FACILITY: ICD-10-CM

## 2024-11-19 ENCOUNTER — LAB ENCOUNTER (OUTPATIENT)
Dept: LAB | Age: 74
End: 2024-11-19
Attending: FAMILY MEDICINE
Payer: MEDICARE

## 2024-11-19 DIAGNOSIS — Z00.00 ROUTINE GENERAL MEDICAL EXAMINATION AT A HEALTH CARE FACILITY: ICD-10-CM

## 2024-11-19 DIAGNOSIS — E78.5 HYPERLIPIDEMIA, UNSPECIFIED HYPERLIPIDEMIA TYPE: ICD-10-CM

## 2024-11-19 LAB
CHOLEST SERPL-MCNC: 196 MG/DL (ref ?–200)
FASTING PATIENT LIPID ANSWER: YES
HDLC SERPL-MCNC: 53 MG/DL (ref 40–59)
LDLC SERPL CALC-MCNC: 134 MG/DL (ref ?–100)
NONHDLC SERPL-MCNC: 143 MG/DL (ref ?–130)
TRIGL SERPL-MCNC: 49 MG/DL (ref 30–149)
VLDLC SERPL CALC-MCNC: 9 MG/DL (ref 0–30)

## 2024-11-19 PROCEDURE — 36415 COLL VENOUS BLD VENIPUNCTURE: CPT

## 2024-11-19 PROCEDURE — 80061 LIPID PANEL: CPT

## 2024-11-20 ENCOUNTER — TELEPHONE (OUTPATIENT)
Dept: NEUROLOGY | Facility: CLINIC | Age: 74
End: 2024-11-20

## 2024-11-20 ENCOUNTER — OFFICE VISIT (OUTPATIENT)
Dept: NEUROLOGY | Facility: CLINIC | Age: 74
End: 2024-11-20
Payer: MEDICARE

## 2024-11-20 VITALS
BODY MASS INDEX: 27 KG/M2 | SYSTOLIC BLOOD PRESSURE: 118 MMHG | WEIGHT: 136 LBS | HEART RATE: 74 BPM | RESPIRATION RATE: 16 BRPM | DIASTOLIC BLOOD PRESSURE: 70 MMHG

## 2024-11-20 DIAGNOSIS — D32.9 MENINGIOMA (HCC): Primary | ICD-10-CM

## 2024-11-20 DIAGNOSIS — G50.0 TRIGEMINAL NEURALGIA OF RIGHT SIDE OF FACE: ICD-10-CM

## 2024-11-20 PROCEDURE — 99213 OFFICE O/P EST LOW 20 MIN: CPT | Performed by: STUDENT IN AN ORGANIZED HEALTH CARE EDUCATION/TRAINING PROGRAM

## 2024-11-20 RX ORDER — DIAZEPAM 2 MG/1
TABLET ORAL
Qty: 2 TABLET | Refills: 0 | Status: SHIPPED | OUTPATIENT
Start: 2024-11-20

## 2024-11-20 NOTE — PATIENT INSTRUCTIONS
-Repeat MRI brain with and without contrast ~4/2025   -Bring CD with old images for upload for comparison    Refill policies:    Allow 2-3 business days for refills; controlled substances may take longer.  Contact your pharmacy at least 5 days prior to running out of medication and have them send an electronic request or submit request through the “request refill” option in your [employer] account.  Refills are not addressed on weekends; covering physicians do not authorize routine medications on weekends.  No narcotics or controlled substances are refilled after noon on Fridays or by on call physicians.  By law, narcotics must be electronically prescribed.  A 30 day supply with no refills is the maximum allowed.  If your prescription is due for a refill, you may be due for a follow up appointment.  To best provide you care, patients receiving routine medications need to be seen at least once a year.  Patients receiving narcotic/controlled substance medications need to be seen at least once every 3 months.  In the event that your preferred pharmacy does not have the requested medication in stock (e.g. Backordered), it is your responsibility to find another pharmacy that has the requested medication available.  We will gladly send a new prescription to that pharmacy at your request.    Scheduling Tests:    If your physician has ordered radiology tests such as MRI or CT scans, please contact Central Scheduling at [phone] right away to schedule the test.  Once scheduled, the [facility] Centralized Referral Team will work with your insurance carrier to obtain pre-certification or prior authorization.  Depending on your insurance carrier, approval may take 3-10 days.  It is highly recommended patients assure they have received an authorization before having a test performed.  If test is done without insurance authorization, patient may be responsible for the entire amount billed.      Precertification and Prior  Authorizations:  If your physician has recommended that you have a procedure or additional testing performed the [facility] Centralized Referral Team will contact your insurance carrier to obtain pre-certification or prior authorization.    You are strongly encouraged to contact your insurance carrier to verify that your procedure/test has been approved and is a COVERED benefit.  Although the [facility] Centralized Referral Team does its due diligence, the insurance carrier gives the disclaimer that \"Although the procedure is authorized, this does not guarantee payment.\"    Ultimately the patient is responsible for payment.   Thank you for your understanding in this matter.  Paperwork Completion:  If you require FMLA or disability paperwork for your recovery, please make sure to either drop it off or have it faxed to our office at [phone]. Be sure the form has your name and date of birth on it.  The form will be faxed to our Forms Department and they will complete it for you.  There is a 25$ fee for all forms that need to be filled out.  Please be aware there is a 10-14 day turnaround time.  You will need to sign a release of information (ELIJAH) form if your paperwork does not come with one.  You may call the Forms Department with any questions at [phone].  Their fax number is [phone].

## 2024-11-20 NOTE — TELEPHONE ENCOUNTER
Patient brought in 1 imaging disc.     [facility]:  Disc 1 MRA Brain DOS 07/18/2024- taken to radiology to assist in uploading due to it faulting twice.     Disc 2 MRI brain DOS 07/17/2024    [facility]  Disc 3 MRI brain DOS 10/22/2024 already in PACS               Uploaded successfully into PACS, PSR confirmed in [employer] imaging is viewable.

## 2024-11-20 NOTE — PROGRESS NOTES
Neurology Office Visit    Silvia Rodríguez   Date of Birth 1/22/1950    Interval history November 20, 2024   Doing well. No recurrence of facial pain. Denies new neurologic symptoms; no new weakness, numbness, tingling, language changes, slurred speech, headache, bowel/bladder changes, loss of consciousness, coordination troubles, gait troubles, vision changes, seizures.     Problem List:  Patient Active Problem List   Diagnosis    Gastritis    Hiatal hernia    Abnormal findings on diagnostic imaging of digestive system    Benign neoplasm of transverse colon    Benign neoplasm of sigmoid colon    Diverticulosis    Internal hemorrhoids    Anorectal skin tags    Mass of left breast    Atypical lobular hyperplasia (ALH) of left breast    Trigeminal neuralgia of right side of face       PMHx:  Past Medical History:    Abdominal distention    Abdominal hernia    Abdominal pain    Also 8/27/2021    Allergic rhinitis    Arthritis    Fingers    Belching    Bloating    Body piercing    Constipation    Decorative tattoo    Easy bruising    Essential hypertension    Flatulence/gas pain/belching    Food intolerance    Milk, ice cream    Headache disorder    Several times per week    Heart palpitations    Occasionally    Hemorrhoids    High blood pressure    History of stomach ulcers    Irregular bowel habits    Nausea    Presence of other cardiac implants and grafts    [place] in right toe    Sleep disturbance    Uncomfortable fullness after meals    Visual impairment    glaases    Wears glasses    Glasses       PSHx:  Past Surgical History:   Procedure Laterality Date    Arthroscopy of joint unlisted Right     knee    Colonoscopy  06/2015    normal; repeat 10 yrs    Colonoscopy      Every 10 years    Colonoscopy,diagnostic N/A 06/29/2015    Procedure: COLONOSCOPY, POSSIBLE BIOPSY, POSSIBLE POLYPECTOMY 40059;  Surgeon: Kevin Vivas MD;  Location: [facility]    D & c  [facility]    Needle biopsy left  09/2023    Stromal  fibrosis      Aug. 1972    Other surgical history      bilateral feet sx-local    Tubal ligation         SocHx:  Social History     Socioeconomic History    Marital status: Life Partner   Tobacco Use    Smoking status: Never     Passive exposure: Past (both parents smoked in home when patient was a child)    Smokeless tobacco: Never    Tobacco comments:     Job:  Works at [employer]   Vaping Use    Vaping status: Never Used   Substance and Sexual Activity    Alcohol use: No    Drug use: Never   Other Topics Concern    Caffeine Concern Yes     Comment: Coffee    Exercise No    Seat Belt No    Special Diet No    Stress Concern No    Weight Concern No       Family History:  Family History   Problem Relation Age of Onset    Cancer Mother         Breast cancer    Breast Cancer Mother 75        estimated age    Cancer Father         Lung cancer    Dementia Sister         Alzheimers    Diabetes Sister         Alzheimers    Diabetes Brother     Other (vagina or uterine cancer) Maternal Grandmother     Ear Problems Neg     Allergies Neg     Bleeding Disorders Neg     Clotting Disorder Neg     Thyroid disease Neg     Hypertension Neg     Asthma Neg     Arthritis Neg     Anesthesia Problems  Neg        Current Medications:  Current Outpatient Medications   Medication Sig Dispense Refill    diazePAM 2 MG Oral Tab Take one tab (2 mg) 30 minutes prior to MRI for anxiety, may repeat once during MRI if needed. Do not drive after taking for remainder of day/get a ride home.). 2 tablet 0    amLODIPine 5 MG Oral Tab Take 1 tablet (5 mg total) by mouth daily. 90 tablet 3       ROS:  No big changes in weight recently, no blood in stool, no night sweats, no chills.     Objective/Physical Exam:    Vital Signs:  Blood pressure 118/70, pulse 74, resp. rate 16, weight 136 lb (61.7 kg).  General: Alert, good recall of personal medical history    Respiratory: Non labored breathing on room air    Cardiovascular: Regular  rate    Mental status: Alert, oriented, language fluent, comprehension intact    Cranial nerves: VF full to confrontation bilaterally. Pupils equal, round, equally reactive to light and accommodation. Extraocular eye movements intact without nystagmus. Face sensation intact to light touch. Face strength symmetric and intact. No dysarthria.    Motor:   Power:   -Right and left  upper extremity: shoulder abductors 5  -Right and left lower extremity: hip flexion 5  Tone: Normal   Bulk: Normal  Abnormal movements: None    Sensory: Intact to light touch in distal extremities.    Coordination: no dysmetria with purposeful hand movements    Reflexes: Right/Left: Biceps 2/2, brachioradialis 2/2, hoffmans negative. Patella 2/2, achilles 2/2,     Gait: Narrow based, symmetric arm swing, no gait assist    Labs:     Component      Latest Ref Rng 4/1/2024   Glucose      70 - 99 mg/dL 91    Sodium      136 - 145 mmol/L 141    Potassium      3.5 - 5.1 mmol/L 3.8    Chloride      98 - 112 mmol/L 107    Carbon Dioxide, Total      21.0 - 32.0 mmol/L 29.0    ANION GAP      0 - 18 mmol/L 5    BUN      9 - 23 mg/dL 16    CREATININE      0.55 - 1.02 mg/dL 0.90    CALCIUM      8.5 - 10.1 mg/dL 9.2    CALCULATED OSMOLALITY      275 - 295 mOsm/kg 293    EGFR      >=60 mL/min/1.73m2 68    Patient Fasting for BMP? Yes          Imaging:  PET body 2023  HEAD AND NECK:  There is physiologic uptake in parapharyngeal tonsillar tissue.     CONCLUSION:    1. Lung nodule described on recent chest CT does not demonstrate any significant metabolic activity and is more likely inflammatory or related to mucous plugging in small airway.   2. Moderate to marked focal activity is noted and associated with the anal sphincter complex.  Correlation with any clinical findings in this area is necessary.     MR brain o 7/2024  IMPRESSION:   1. Mild white matter signal alteration in the frontal lobes is somewhat nonspecific but likely reflects chronic  microvascular ischemic changes. No acute intracranial pathology is seen.   2. Findings consistent with a small meningioma in the left frontoparietal region near the high convexity.   3. Minimal to mild paranasal sinus disease, likely chronic.     MRA brain wo 7/2024  IMPRESSION:   Unremarkable MR angiogram of the brain.     MRI BRAIN (W+WO) (CPT=70553)    Result Date: 10/22/2024  PROCEDURE:  MRI BRAIN (W+WO) (CPT=70553)  COMPARISON:  None.  INDICATIONS:  D32.9 Meningioma (HCC)  TECHNIQUE:  MRI of the brain was performed with multi-planar T1, T2-weighted images with FLAIR sequences and diffusion weighted images without and with infusion.  PATIENT STATED HISTORY:(As transcribed by Technologist)  Surveillance imaging of meningioma at  left frontoparietal region near the high convexity.   CONTRAST USED:  18 mL of Dotarem  FINDINGS:  The ventricles and sulci are within normal limits.  There is no midline shift or significant mass effect.  The basal cisterns are patent.   There is no acute intracranial hemorrhage or extra-axial fluid collection identified.  There is no restricted diffusion to suggest acute ischemia/infarction.  Trace chronic small vessel ischemic disease.  There is a probable meningioma overlying the left vertex measuring 13 x 11 x 5 mm (AP by TR by CC).  No edema is seen within the subjacent brain parenchyma.  Moderate opacification of the posterior left ethmoid sinus.  The expected major intracranial flow voids are present.            CONCLUSION:  1. No acute infarct, acute intracranial hemorrhage, or hydrocephalus. 2. There is a 13 mm probable meningioma overlying the left vertex. 3. Trace chronic small vessel ischemic disease.   LOCATION:  [facility]    Dictated by (CST): Stromberg, LeRoy, MD on 10/22/2024 at 2:16 PM     Finalized by (CST): Stromberg, LeRoy, MD on 10/22/2024 at 2:27 PM          Assessment:  Silvia Rodríguez is a(n) 74 year old female benign left breast mass (sp excision), pulmonary nodule  (surveillance per pulmonology), PET positive anal lesions (GI w reassuring workup, mri unrevealing), borderline hypertension, pericardial effusion (TTE pending) who presents for right lower facial paresthesias provoked by brushing teeth/talking; not an issue recently. Neurologic exam unrevealing. PET body 9/2023 without abnormalities in head and neck per report. 7/2024 MRI brain wwo MRA head neck with small left frontoparietal meningioma, MRA head unrevealing. Given unrevealing imaging, headaches felt most consistent with primary right trigeminal neuralgia. Previously discussed oxcarbazepine for symptomatic management, she would like to hold off for now.    Feel that meningioma incidental. Had recent repeat surveillance MRI brain 10/2024; awaiting neuro-radiology comparison, will touch base with Silvia when I receive (possible trace growth from my perspective). Repeat MRI brain wwo in 6m, bring CD with old images for comparison. She is also following with Dr. Bernabe Guardado neurosurgery for meningioma. If new neurologic symptoms she is to let us know.     Plan:  -Let me know if you want to trial oxcarbazepine  -Repeat MRI brain with and without contrast ~4/2025   -Bring CD with old images for upload for comparison  -Will udpate Silvia once 10/24 MRI has been compared to 7/24 by radiology    1. Meningioma (HCC)  - z [facility] MRI BRAIN (W+WO) (CPT=70553); Future  - z [facility] MRI BRAIN (W+WO) (CPT=70553)  - diazePAM 2 MG Oral Tab; Take one tab (2 mg) 30 minutes prior to MRI for anxiety, may repeat once during MRI if needed. Do not drive after taking for remainder of day/get a ride home.).  Dispense: 2 tablet; Refill: 0    2. Trigeminal neuralgia of right side of face    Risa Block, DO

## 2024-11-21 ENCOUNTER — TELEPHONE (OUTPATIENT)
Dept: NEUROLOGY | Facility: CLINIC | Age: 74
End: 2024-11-21

## 2024-11-21 NOTE — TELEPHONE ENCOUNTER
Received prior authorization approval for Diazepam 2mg effective 1/1/24-12/20/24. Faxed to dispensing pharmacy.

## 2024-11-25 ENCOUNTER — OFFICE VISIT (OUTPATIENT)
Dept: INTERNAL MEDICINE CLINIC | Facility: CLINIC | Age: 74
End: 2024-11-25
Payer: MEDICARE

## 2024-11-25 VITALS
HEIGHT: 59 IN | WEIGHT: 137 LBS | DIASTOLIC BLOOD PRESSURE: 68 MMHG | OXYGEN SATURATION: 98 % | RESPIRATION RATE: 16 BRPM | TEMPERATURE: 98 F | HEART RATE: 68 BPM | BODY MASS INDEX: 27.62 KG/M2 | SYSTOLIC BLOOD PRESSURE: 124 MMHG

## 2024-11-25 DIAGNOSIS — E78.5 HYPERLIPIDEMIA, UNSPECIFIED HYPERLIPIDEMIA TYPE: ICD-10-CM

## 2024-11-25 DIAGNOSIS — J84.10 PULMONARY FIBROSIS, UNSPECIFIED (HCC): ICD-10-CM

## 2024-11-25 DIAGNOSIS — Z00.00 ENCOUNTER FOR ANNUAL HEALTH EXAMINATION: Primary | ICD-10-CM

## 2024-11-25 DIAGNOSIS — I10 ESSENTIAL HYPERTENSION: ICD-10-CM

## 2024-11-25 NOTE — PROGRESS NOTES
Subjective:   Silvia Rodríguez is a 74 year old female who presents for a Medicare Subsequent Annual Wellness visit (Pt already had Initial Annual Wellness) and scheduled follow up of multiple significant but stable problems.   1. Encounter for annual health examination (Primary)- doing well. Not exercising but is physically active.   2. Pulmonary fibrosis, unspecified (HCC)- sees Pulm. No current symptoms.   3. Essential hypertension- taking BP med as prescribed with no issues.   4. Hyperlipidemia, unspecified hyperlipidemia type- tries to eat healthy.     History/Other:   Fall Risk Assessment:   She has been screened for Falls and is low risk.      Cognitive Assessment:   She had a completely normal cognitive assessment - see flowsheet entries     Functional Ability/Status:   Silvia Rodríguez has a completely normal functional assessment. See flowsheet for details.        Depression Screening (PHQ):  PHQ-2 SCORE: 0  , done 11/19/2024             Advanced Directives:   She does NOT have a Living Will. [Do you have a living will?: (Patient-Rptd) No]  She does NOT have a Power of  for Health Care. [Do you have a healthcare power of ?: (Patient-Rptd) No]  Patient has Advance Care Planning documents but we do not have a copy in EMR. Discussed Advanced Care Planning with patient and instructed patient to get our office a copy to be scanned into EMR.      Patient Active Problem List   Diagnosis    Gastritis    Hiatal hernia    Abnormal findings on diagnostic imaging of digestive system    Benign neoplasm of transverse colon    Benign neoplasm of sigmoid colon    Diverticulosis    Internal hemorrhoids    Anorectal skin tags    Mass of left breast    Atypical lobular hyperplasia (ALH) of left breast    Trigeminal neuralgia of right side of face    Pulmonary fibrosis, unspecified (HCC)     Allergies:  She is allergic to aspirin, hydrocodone, and nsaids.    Current Medications:  Outpatient Medications Marked as Taking for  the 24 encounter (Office Visit) with Abena Ayala, DO   Medication Sig    diazePAM 2 MG Oral Tab Take one tab (2 mg) 30 minutes prior to MRI for anxiety, may repeat once during MRI if needed. Do not drive after taking for remainder of day/get a ride home.).    amLODIPine 5 MG Oral Tab Take 1 tablet (5 mg total) by mouth daily.       Medical History:  She  has a past medical history of Abdominal distention (2019), Abdominal hernia, Abdominal pain (2019, 20), Allergic rhinitis, Arthritis, Belching (Constant), Bloating (), Body piercing (Ears), Constipation (2021), Decorative tattoo (One on buttock), Easy bruising, Essential hypertension, Flatulence/gas pain/belching (2019), Food intolerance, Headache disorder, Heart palpitations, Hemorrhoids (), High blood pressure, History of stomach ulcers, Irregular bowel habits (2021), Nausea (2021), Presence of other cardiac implants and grafts, Sleep disturbance ([place]), Uncomfortable fullness after meals (2021), Visual impairment, and Wears glasses.  Surgical History:  She  has a past surgical history that includes other surgical history; colonoscopy (2015); colonoscopy,diagnostic (N/A, 2015); colonoscopy; d & c ();  (Aug. 1972); tubal ligation (); arthroscopy of joint unlisted (Right); and needle biopsy left (2023).   Family History:  Her family history includes Breast Cancer (age of onset: 75) in her mother; Cancer in her father and mother; Dementia in her sister; Diabetes in her brother and sister; vagina or uterine cancer in her maternal grandmother.  Social History:  She  reports that she has never smoked. She has been exposed to tobacco smoke. She has never used smokeless tobacco. She reports that she does not drink alcohol and does not use drugs.    Tobacco:  She has never smoked tobacco.    CAGE Alcohol Screen:   CAGE screening score of 0 on 2024, showing low risk of  alcohol abuse.      Patient Care Team:  Abena Ayala DO as PCP - General (Family Practice)  Maribell Arshad PT as Physical Therapist (Physical Therapy)  Junior Winkler MD (GASTROENTEROLOGY)  Syed Gunter APRN (Nurse Practitioner)    Review of Systems  GENERAL: feels well otherwise  SKIN: denies any unusual skin lesions  EYES: denies blurred vision or double vision  HEENT: denies nasal congestion, sinus pain or ST  LUNGS: denies shortness of breath with exertion  CARDIOVASCULAR: denies chest pain on exertion  GI: denies abdominal pain, denies heartburn  : denies dysuria, vaginal discharge or itching, no complaint of urinary incontinence   MUSCULOSKELETAL: denies back pain  NEURO: denies headaches  PSYCHE: denies depression or anxiety  HEMATOLOGIC: denies hx of anemia  ENDOCRINE: denies thyroid history  ALL/ASTHMA: denies hx of allergy or asthma    Objective:   Physical Exam  General Appearance:  Alert, cooperative, no distress, appears stated age   Head:  Normocephalic, without obvious abnormality, atraumatic   Eyes:  PERRL, conjunctiva/corneas clear, EOM's intact both eyes   Ears:  Normal TM's and external ear canals, both ears   Nose: Nares normal, septum midline,mucosa normal, no drainage or sinus tenderness   Throat: Lips, mucosa, and tongue normal; teeth and gums normal   Neck: Supple, symmetrical, trachea midline, no adenopathy;  thyroid: not enlarged, symmetric, no tenderness/mass/nodules; no carotid bruit or JVD   Back:   Symmetric, no curvature, ROM normal, no CVA tenderness   Lungs:   Clear to auscultation bilaterally, respirations unlabored   Heart:  Regular rate and rhythm, S1 and S2 normal, no murmur, rub, or gallop       Pelvic: Deferred   Extremities: Extremities normal, atraumatic, no cyanosis or edema   Pulses: 2+ and symmetric   Skin: Skin color, texture, turgor normal, no rashes or lesions   Lymph nodes: Cervical, supraclavicular, and axillary nodes normal   Neurologic: Normal        /68 (BP Location: Left arm, Patient Position: Sitting, Cuff Size: adult)   Pulse 68   Temp 98 °F (36.7 °C) (Temporal)   Resp 16   Ht 4' 11\" (1.499 m)   Wt 137 lb (62.1 kg)   SpO2 98%   BMI 27.67 kg/m²  Estimated body mass index is 27.67 kg/m² as calculated from the following:    Height as of this encounter: 4' 11\" (1.499 m).    Weight as of this encounter: 137 lb (62.1 kg).    Medicare Hearing Assessment:   Hearing Screening    Time taken: 11/25/2024 10:01 AM  Entry User: Nikia Martinez MA  Screening Method: Finger Rub  Finger Rub Result: Pass         Visual Acuity:   Right Eye Visual Acuity: Corrected Right Eye Chart Acuity: 20/25   Left Eye Visual Acuity: Corrected Left Eye Chart Acuity: 20/30   Both Eyes Visual Acuity: Corrected Both Eyes Chart Acuity: 20/30   Able To Tolerate Visual Acuity: Yes        Assessment & Plan:   Silvia Rodríguez is a 74 year old female who presents for a Medicare Assessment.     1. Encounter for annual health examination (Primary)  2. Pulmonary fibrosis, unspecified (HCC)  3. Essential hypertension  4. Hyperlipidemia, unspecified hyperlipidemia type    The patient indicates understanding of these issues and agrees to the plan.  Reinforced healthy diet, lifestyle, and exercise.  1. Encounter for annual health examination (Primary)- Reviewed age-appropriate preventive health and safety recommendations with patient. Reviewed lab results. Encouraged regular exercise and healthy eating.   2. Pulmonary fibrosis, unspecified (HCC)- stable clinically. F/u Pulm.   3. Essential hypertension- BP controlled on med. Continue.   4. Hyperlipidemia, unspecified hyperlipidemia type- limit saturated fats.     Return in 6 months (on 5/25/2025).     Abena Ayala DO, 11/25/2024     Supplementary Documentation:   General Health:  In the past six months, have you lost more than 10 pounds without trying?: (Patient-Rptd) 2 - No  Has your appetite been poor?: (Patient-Rptd) No  Type of Diet:  (Patient-Rptd) Balanced  How does the patient maintain a good energy level?: (Patient-Rptd) Daily Walks  How would you describe your daily physical activity?: (Patient-Rptd) Light  How would you describe your current health state?: (Patient-Rptd) Good  How do you maintain positive mental well-being?: (Patient-Rptd) Social Interaction;Games;Visiting Friends;Visiting Family  On a scale of 0 to 10, with 0 being no pain and 10 being severe pain, what is your pain level?: (Patient-Rptd) 0 - (None)  In the past six months, have you experienced urine leakage?: (Patient-Rptd) 0-No  At any time do you feel concerned for the safety/well-being of yourself and/or your children, in your home or elsewhere?: (Patient-Rptd) No  Have you had any immunizations at another office such as Influenza, Hepatitis B, Tetanus, or Pneumococcal?: (Patient-Rptd) No    Health Maintenance   Topic Date Due    Annual Physical  11/18/2024    Mammogram  04/01/2025    Colorectal Cancer Screening  09/19/2026    Influenza Vaccine  Completed    DEXA Scan  Completed    Annual Depression Screening  Completed    Fall Risk Screening (Annual)  Completed    Pneumococcal Vaccine: 65+ Years  Completed    Zoster Vaccines  Completed    COVID-19 Vaccine  Completed

## 2025-01-10 ENCOUNTER — APPOINTMENT (OUTPATIENT)
Dept: GENERAL RADIOLOGY | Age: 75
End: 2025-01-10
Attending: NURSE PRACTITIONER
Payer: MEDICARE

## 2025-01-10 ENCOUNTER — HOSPITAL ENCOUNTER (OUTPATIENT)
Age: 75
Discharge: HOME | End: 2025-01-10
Payer: MEDICARE

## 2025-01-10 VITALS
RESPIRATION RATE: 16 BRPM | BODY MASS INDEX: 26.9 KG/M2 | OXYGEN SATURATION: 98 % | HEIGHT: 60 IN | DIASTOLIC BLOOD PRESSURE: 71 MMHG | WEIGHT: 137 LBS | SYSTOLIC BLOOD PRESSURE: 137 MMHG | TEMPERATURE: 98 F | HEART RATE: 71 BPM

## 2025-01-10 DIAGNOSIS — S52.124A CLOSED NONDISPLACED FRACTURE OF HEAD OF RIGHT RADIUS, INITIAL ENCOUNTER: Primary | ICD-10-CM
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
## 2025-01-10 PROCEDURE — A4565 SLINGS: HCPCS | Performed by: NURSE PRACTITIONER

## 2025-01-10 PROCEDURE — 73080 X-RAY EXAM OF ELBOW: CPT | Performed by: NURSE PRACTITIONER

## 2025-01-10 PROCEDURE — 73090 X-RAY EXAM OF FOREARM: CPT | Performed by: NURSE PRACTITIONER

## 2025-01-10 PROCEDURE — 99213 OFFICE O/P EST LOW 20 MIN: CPT | Performed by: NURSE PRACTITIONER

## 2025-01-10 NOTE — DISCHARGE INSTRUCTIONS
Wear the sling for comfort.  Gentle range of motion exercises with the shoulder as discussed.  Rest and ice.  Tylenol and ibuprofen.  Follow closely with orthopedics.

## 2025-01-10 NOTE — ED PROVIDER NOTES
Patient Seen in: [facility]      History     Chief Complaint   Patient presents with    Fall     Stated Complaint: arm pain    Subjective:   This a 74-year-old female with below stated medical history who presents immediate care for slip and fall.  Fell onto her outstretched right arm.  Pain to the right elbow and forearm.  She fell on the ice just 1 hour prior to arrival.  She denies hitting her head or losing consciousness.  She had no neck or back pain.  No numbness or tingling to the extremity.  No treatment attempted prior to arrival.     The history is provided by the patient.             Objective:     Past Medical History:    Abdominal distention    Abdominal hernia    Abdominal pain    Also 2021    Allergic rhinitis    Arthritis    Fingers    Belching    Bloating    Body piercing    Constipation    Decorative tattoo    Easy bruising    Essential hypertension    Flatulence/gas pain/belching    Food intolerance    Milk, ice cream    Headache disorder    Several times per week    Heart palpitations    Occasionally    Hemorrhoids    High blood pressure    History of stomach ulcers    Irregular bowel habits    Nausea    Presence of other cardiac implants and grafts    [place] in right toe    Sleep disturbance    Uncomfortable fullness after meals    Visual impairment    glaases    Wears glasses    Glasses              Past Surgical History:   Procedure Laterality Date    Arthroscopy of joint unlisted Right     knee    Colonoscopy  2015    normal; repeat 10 yrs    Colonoscopy      Every 10 years    Colonoscopy,diagnostic N/A 2015    Procedure: COLONOSCOPY, POSSIBLE BIOPSY, POSSIBLE POLYPECTOMY 62412;  Surgeon: Kevin Vivas MD;  [facility]    D & c  1971    Needle biopsy left  2023    Stromal fibrosis      Aug. 1972    Other surgical history      bilateral feet sx-local    Tubal ligation  1981                Social History     Socioeconomic  History    Marital status: Engaged   Tobacco Use    Smoking status: Never     Passive exposure: Past (both parents smoked in home when patient was a child)    Smokeless tobacco: Never    Tobacco comments:     Job:  Works at [employer]   Vaping Use    Vaping status: Never Used   Substance and Sexual Activity    Alcohol use: No    Drug use: Never   Other Topics Concern    Caffeine Concern Yes     Comment: Coffee    Exercise No    Seat Belt No    Special Diet No    Stress Concern No    Weight Concern No              Review of Systems   Constitutional:  Negative for chills and fever.   HENT:  Negative for congestion and sore throat.    Respiratory:  Negative for cough.    Cardiovascular:  Negative for chest pain, palpitations and leg swelling.   Gastrointestinal:  Negative for abdominal pain, constipation, diarrhea, nausea and vomiting.   Genitourinary:  Negative for dysuria.   Musculoskeletal:  Positive for arthralgias and myalgias. Negative for back pain, neck pain and neck stiffness.   Skin:  Negative for rash.   Neurological:  Negative for numbness.       Positive for stated complaint: arm pain  Other systems are as noted in HPI.  Constitutional and vital signs reviewed.      All other systems reviewed and negative except as noted above.    Physical Exam     ED Triage Vitals [01/10/25 1340]   /71   Pulse 71   Resp 16   Temp 98.4 °F (36.9 °C)   Temp src Oral   SpO2 98 %   O2 Device None (Room air)       Current Vitals:   Vital Signs  BP: 137/71  Pulse: 71  Resp: 16  Temp: 98.4 °F (36.9 °C)  Temp src: Oral    Oxygen Therapy  SpO2: 98 %  O2 Device: None (Room air)        Physical Exam  Vitals and nursing note reviewed.   Constitutional:       General: She is not in acute distress.     Appearance: Normal appearance. She is not ill-appearing, toxic-appearing or diaphoretic.   HENT:      Head: Normocephalic and atraumatic.      Right Ear: External ear normal.      Left Ear: External ear normal.      Nose:  Nose normal.      Mouth/Throat:      Mouth: Mucous membranes are moist.      Pharynx: Oropharynx is clear.   Eyes:      General:         Right eye: No discharge.         Left eye: No discharge.      Extraocular Movements: Extraocular movements intact.      Conjunctiva/sclera: Conjunctivae normal.   Cardiovascular:      Rate and Rhythm: Normal rate.   Pulmonary:      Effort: Pulmonary effort is normal.   Musculoskeletal:      Right shoulder: Normal.      Right upper arm: Normal.      Right elbow: Swelling present. No deformity, effusion or lacerations. Decreased range of motion. Tenderness present in radial head, medial epicondyle and lateral epicondyle. No olecranon process tenderness.      Right forearm: Tenderness and bony tenderness present. No swelling, edema, deformity or lacerations.      Right wrist: Normal.      Right hand: Normal.      Cervical back: Neck supple.      Right lower leg: No edema.      Left lower leg: No edema.   Skin:     General: Skin is warm and dry.      Capillary Refill: Capillary refill takes less than 2 seconds.      Findings: No rash.   Neurological:      Mental Status: She is alert and oriented to person, place, and time.   Psychiatric:         Mood and Affect: Mood normal.         Behavior: Behavior normal.             ED Course   Labs Reviewed - No data to display         Xray right elbow and forearm       MDM        Vital signs stable.  Patient is well appearing and non toxic looking.  Presents to the IC for slip and fall with injury to the right elbow and forearm.     Differential diagnosis includes but is no limited too sprain, contusion, cellulitis, fracture, arthritis, tendonitis    Mild tenderness to the right l epicondyles and radial head.  Mild swelling.  No obvious deformity.  Distal CMS intact.    Xrays films reviewed and interpreted by myself. Results show mildly depressed fracture of the radial head.    Sling placed on the right arm in my presence.  Neurovascularly  intact after placement.    Dc home. Rice instructions reviewed. Tylenol/ibuprofen for pain.  Close ortho follow up.  Patient verbalized understanding and agreed with plan of care.  All questions were answered.                Medical Decision Making      Disposition and Plan     Clinical Impression:  1. Closed nondisplaced fracture of head of right radius, initial encounter    2. Fall         Disposition:  Discharge  1/10/2025  2:26 pm    Follow-up:  Noah Kovacs, PA  [address] [place] [id number]  [phone]    In 1 week            Medications Prescribed:  Current Discharge Medication List              Supplementary Documentation:

## 2025-01-10 NOTE — ED INITIAL ASSESSMENT (HPI)
Pt presents to the IC with c/o a fall this morning on the ice. Pt fell forward, mainly on the right side. Pt has pain with movement and tenderness to the elbow and mid forearm. Ice applied at home. Allergies to NSAIDS.

## 2025-01-13 ENCOUNTER — TELEPHONE (OUTPATIENT)
Facility: CLINIC | Age: 75
End: 2025-01-13

## 2025-01-13 DIAGNOSIS — M25.521 RIGHT ELBOW PAIN: Primary | ICD-10-CM

## 2025-01-13 NOTE — TELEPHONE ENCOUNTER
Spoke to patient, scheduled for   Future Appointments   Date Time Provider Department Center   1/17/2025 10:45 AM NAP XR RM1 [facility] XRAY [facility] [place]   1/17/2025 11:00 AM Genevieve Hernandez PA [id number]   3/18/2025 11:00 AM Liv Perez MD [facility] Pulm [facility] [place]   4/4/2025  9:00 AM [facility] RM3 [facility]

## 2025-01-13 NOTE — TELEPHONE ENCOUNTER
Patient was seen in  on 1/10/25 for right elbow fracture.    Imaging in [facility].    Please advise when patient can be seen.

## 2025-01-13 NOTE — TELEPHONE ENCOUNTER
Can you see this?  Can we use an SDA this friday?     Lives in [place]  DOI: 1/10/25  CONCLUSION:  Mildly depressed acute radial head fracture with right elbow effusion.  No dislocation.

## 2025-01-17 ENCOUNTER — HOSPITAL ENCOUNTER (OUTPATIENT)
Dept: GENERAL RADIOLOGY | Age: 75
Discharge: HOME | End: 2025-01-17
Attending: PHYSICIAN ASSISTANT
Payer: MEDICARE

## 2025-01-17 ENCOUNTER — HOSPITAL ENCOUNTER (OUTPATIENT)
Dept: CT IMAGING | Facility: HOSPITAL | Age: 75
Discharge: HOME | End: 2025-01-17
Attending: PHYSICIAN ASSISTANT
Payer: MEDICARE

## 2025-01-17 ENCOUNTER — OFFICE VISIT (OUTPATIENT)
Dept: ORTHOPEDICS CLINIC | Facility: CLINIC | Age: 75
End: 2025-01-17
Payer: MEDICARE

## 2025-01-17 VITALS — HEIGHT: 60 IN | BODY MASS INDEX: 26.9 KG/M2 | WEIGHT: 137 LBS

## 2025-01-17 DIAGNOSIS — S52.121A CLOSED DISPLACED FRACTURE OF HEAD OF RIGHT RADIUS, INITIAL ENCOUNTER: ICD-10-CM

## 2025-01-17 DIAGNOSIS — S52.121A CLOSED DISPLACED FRACTURE OF HEAD OF RIGHT RADIUS, INITIAL ENCOUNTER: Primary | ICD-10-CM

## 2025-01-17 DIAGNOSIS — M25.521 RIGHT ELBOW PAIN: ICD-10-CM

## 2025-01-17 PROCEDURE — 73200 CT UPPER EXTREMITY W/O DYE: CPT | Performed by: PHYSICIAN ASSISTANT

## 2025-01-17 PROCEDURE — 99204 OFFICE O/P NEW MOD 45 MIN: CPT | Performed by: PHYSICIAN ASSISTANT

## 2025-01-17 PROCEDURE — 73080 X-RAY EXAM OF ELBOW: CPT | Performed by: PHYSICIAN ASSISTANT

## 2025-01-17 PROCEDURE — 76376 3D RENDER W/INTRP POSTPROCES: CPT | Performed by: PHYSICIAN ASSISTANT

## 2025-01-17 NOTE — H&P
Clinic Note [facility]     Assessment/Plan:  74 year old female    Left elbow radial head fracture, minimally displaced-I showed the x-rays to Dr. Coleman we would like to order a CT scan for further evaluation.  This will rule out the need for surgical intervention.  If she does not need surgery she will continue with the sling working on very gentle elbow motion but no heavy lifting or weightbearing.  We will discuss on the phone after her CT scan today or tomorrow.  All of her questions were answered.      ICD-10-CM    1. Closed displaced fracture of head of right radius, initial encounter  S52.121A CT ELBOW RIGHT (CPT=73200)           Follow Up: After CT scan    Diagnostic Studies:  X-rays reveal left elbow radial head fracture with slight diastases or displacement    Physical Exam:    Ht 5' (1.524 m)   Wt 137 lb (62.1 kg)   BMI 26.76 kg/m²     Constitutional: NAD. AOx3. Well-developed and Well-nourished.   Psychiatric: Normal mood/ affect/ behavior. Judgment and thought content normal.     Left upper Extremity:   Inspection: Skin Intact. No skin lesions. No gross deformity.   Palpation: Tender palpation along the fracture site.  Nontender over the wrist.   Motion: Elbow: 40/110  Wrist: normal bilateral symmetric ext/flex/sup/pro  Finger: full composite fist       CC: Elbow Injury (LT ELBOW FX; DOI: 1/10/25; SLIPPED ON ICE )        HPI: This 74 year old female presents with complaints of left elbow pain.  On 1/10/2025 she slipped on ice and landed on her elbow.  She has had pain and swelling.  She went to urgent care and they told her follow-up with orthopedics.  She has been in a sling since then.    @[email or web address]@      Past Medical History:    Abdominal distention    Abdominal hernia    Abdominal pain    Also 8/27/2021    Allergic rhinitis    Arthritis    Fingers    Belching    Bloating    Body piercing    Constipation    Decorative tattoo    Easy bruising    Essential hypertension     Flatulence/gas pain/belching    Food intolerance    Milk, ice cream    Headache disorder    Several times per week    Heart palpitations    Occasionally    Hemorrhoids    High blood pressure    History of stomach ulcers    Irregular bowel habits    Nausea    Presence of other cardiac implants and grafts    [place] in right toe    Sleep disturbance    Uncomfortable fullness after meals    Visual impairment    glaases    Wears glasses    Glasses       Past Surgical History:   Procedure Laterality Date    Arthroscopy of joint unlisted Right     knee    Colonoscopy  2015    normal; repeat 10 yrs    Colonoscopy      Every 10 years    Colonoscopy,diagnostic N/A 2015    Procedure: COLONOSCOPY, POSSIBLE BIOPSY, POSSIBLE POLYPECTOMY 07717;  Surgeon: Kevin Vivas MD;  [facility]    D & c  [facility]    Needle biopsy left  2023    Stromal fibrosis      Aug. 1972    Other surgical history      bilateral feet sx-local    Tubal ligation         Current Outpatient Medications   Medication Sig Dispense Refill    diazePAM 2 MG Oral Tab Take one tab (2 mg) 30 minutes prior to MRI for anxiety, may repeat once during MRI if needed. Do not drive after taking for remainder of day/get a ride home.). 2 tablet 0    amLODIPine 5 MG Oral Tab Take 1 tablet (5 mg total) by mouth daily. 90 tablet 3       Allergies[1]    Family History   Problem Relation Age of Onset    Cancer Mother         Breast cancer    Breast Cancer Mother 75        estimated age    Cancer Father         Lung cancer    Dementia Sister         Alzheimers    Diabetes Sister         Alzheimers    Diabetes Brother     Other (vagina or uterine cancer) Maternal Grandmother     Ear Problems Neg     Allergies Neg     Bleeding Disorders Neg     Clotting Disorder Neg     Thyroid disease Neg     Hypertension Neg     Asthma Neg     Arthritis Neg     Anesthesia Problems  Neg        Social History     Occupational History    Not on file   Tobacco  Use    Smoking status: Never     Passive exposure: Past (both parents smoked in home when patient was a child)    Smokeless tobacco: Never    Tobacco comments:     Job:  Works at [employer]   Vaping Use    Vaping status: Never Used   Substance and Sexual Activity    Alcohol use: No    Drug use: Never    Sexual activity: Not on file        Review of Systems (negative unless bolded):  General: fevers, chills, fatigue  CV:  chest pain, palpitations, leg swelling  Msk: bodyaches, neck pain, neck stiffness  Skin: rashes, open wounds, nonhealing ulcers  Hem: bleeds easily, bruise easily, immunocompromised  Neuro: dizziness, light headedness, headaches  Psych: anxious, depressed, anger issues  Genevieve Hernandez PA-C  Hand, Wrist, & Elbow Surgery  Physician Assistant to Dr. Nabor [email or web address]  t: [phone]  f: [phone]         [1]   Allergies  Allergen Reactions    Aspirin HIVES    Hydrocodone UNKNOWN    Nsaids HIVES

## 2025-01-18 ENCOUNTER — TELEPHONE (OUTPATIENT)
Dept: INTERNAL MEDICINE CLINIC | Facility: CLINIC | Age: 75
End: 2025-01-18

## 2025-01-18 DIAGNOSIS — M85.80 OSTEOPENIA, UNSPECIFIED LOCATION: Primary | ICD-10-CM

## 2025-01-18 DIAGNOSIS — Z78.0 MENOPAUSE: ICD-10-CM

## 2025-01-20 NOTE — TELEPHONE ENCOUNTER
Called patient. She went to urgent care a week ago Friday. Fractured elbow. Ortho this past Friday and follow up on 2/3/25. CT shows no surgery is needed. In a sling in 4 weeks. No cast.   Patient agreeable to Dexa. Order placed. Advised patient to schedule once elbow is completely healed.     Dr Ayala - Dexa not covered with Osteopenia diagnosis. Do you want to add any other diagnosis?

## 2025-01-27 ENCOUNTER — TELEPHONE (OUTPATIENT)
Dept: ORTHOPEDICS CLINIC | Facility: CLINIC | Age: 75
End: 2025-01-27

## 2025-01-27 NOTE — TELEPHONE ENCOUNTER
Incoming call transferred to this writer.   Patient states her fingers are \"still swollen\" and wondering what she can do and if this is normal.     1/17/25 OV with Genevieve for left elbow radial head fracture, minimally displaced     CMS intact. Fingers warm to touch and sensation within normal limits per patient. Denies discoloration. She states she is able to close her fingers and grab a brush, which she was unable to do before; just concerned why still so swollen.      Advised to continue to take tylenol as directed on package, not to exceed 3000 mg in 24 hours (patient is allergic to NSAIDs)   Rest, Ice and elevate hand, ok to be out of sling when icing & elevating hand but no weight bearing allowed to elbow  Remove all rings    Patient grateful for answering call and verbalized understanding to call back if symptoms worsen.

## 2025-01-31 ENCOUNTER — OFFICE VISIT (OUTPATIENT)
Dept: ORTHOPEDICS CLINIC | Facility: CLINIC | Age: 75
End: 2025-01-31
Payer: MEDICARE

## 2025-01-31 ENCOUNTER — HOSPITAL ENCOUNTER (OUTPATIENT)
Dept: GENERAL RADIOLOGY | Age: 75
Discharge: HOME | End: 2025-01-31
Attending: PHYSICIAN ASSISTANT
Payer: MEDICARE

## 2025-01-31 VITALS — HEIGHT: 60 IN | WEIGHT: 137 LBS | BODY MASS INDEX: 26.9 KG/M2

## 2025-01-31 DIAGNOSIS — M25.522 LEFT ELBOW PAIN: ICD-10-CM

## 2025-01-31 DIAGNOSIS — M25.521 RIGHT ELBOW PAIN: ICD-10-CM

## 2025-01-31 DIAGNOSIS — S52.121A CLOSED DISPLACED FRACTURE OF HEAD OF RIGHT RADIUS, INITIAL ENCOUNTER: ICD-10-CM

## 2025-01-31 PROCEDURE — 99213 OFFICE O/P EST LOW 20 MIN: CPT | Performed by: PHYSICIAN ASSISTANT

## 2025-01-31 PROCEDURE — 73080 X-RAY EXAM OF ELBOW: CPT | Performed by: PHYSICIAN ASSISTANT

## 2025-01-31 NOTE — PROGRESS NOTES
Clinic Note [facility]     Assessment/Plan:  75 year old female    Left elbow radial head fracture, minimally displaced-CT scan confirmed minimally displaced, Dr. Meza reviewed this as well-x-rays show no displacement today.  She will use a sling only when out in public and at home she will work on range of motion exercises she begin therapy in a week or 2 for active and passive range of motion exercises.  No heavy lifting weightbearing.  All of her questions were answered      ICD-10-CM    1. Right elbow pain  M25.521       2. Left elbow pain  M25.522       3. Closed displaced fracture of head of right radius, initial encounter  S52.121A XR ELBOW, COMPLETE (MIN 3 VIEWS), RIGHT (CPT=73080)     OCCUPATIONAL THERAPY-EXTERNAL           Follow Up: 3 weeks with x-rays before    Diagnostic Studies:  X-rays reveal left elbow radial head fracture with slight diastases or displacement    Physical Exam:    Ht 5' (1.524 m)   Wt 137 lb (62.1 kg)   BMI 26.76 kg/m²     Constitutional: NAD. AOx3. Well-developed and Well-nourished.   Psychiatric: Normal mood/ affect/ behavior. Judgment and thought content normal.     Left upper Extremity:   Inspection: Skin Intact. No skin lesions. No gross deformity.   Palpation: Tender palpation along the fracture site.  Nontender over the wrist.   Motion: Elbow: 20/130  Wrist: normal bilateral symmetric ext/flex/sup/pro  Finger: full composite fist       CC: Follow - Up (RT ELBOW; CT RESULTS/)        HPI: This 75 year old female presents with complaints of left elbow pain.  On 1/10/2025 she slipped on ice and landed on her elbow.  She has had pain and swelling.  She went to urgent care and they told her follow-up with orthopedics.  She has been in a sling since then.      Interval history: Patient returns for follow-up regarding her elbow fracture.      Past Medical History:    Abdominal distention    Abdominal hernia    Abdominal pain    Also 8/27/2021    Allergic rhinitis     Arthritis    Fingers    Belching    Bloating    Body piercing    Constipation    Decorative tattoo    Easy bruising    Essential hypertension    Flatulence/gas pain/belching    Food intolerance    Milk, ice cream    Headache disorder    Several times per week    Heart palpitations    Occasionally    Hemorrhoids    High blood pressure    History of stomach ulcers    Irregular bowel habits    Nausea    Presence of other cardiac implants and grafts    [place] in right toe    Sleep disturbance    Uncomfortable fullness after meals    Visual impairment    glaases    Wears glasses    Glasses       Past Surgical History:   Procedure Laterality Date    Arthroscopy of joint unlisted Right     knee    Colonoscopy  2015    normal; repeat 10 yrs    Colonoscopy      Every 10 years    Colonoscopy,diagnostic N/A 2015    Procedure: COLONOSCOPY, POSSIBLE BIOPSY, POSSIBLE POLYPECTOMY 80962;  Surgeon: Kevin Vivas MD;  [facility]    D & c  1971    Needle biopsy left  2023    Stromal fibrosis      Aug. 1972    Other surgical history      bilateral feet sx-local    Tubal ligation         Current Outpatient Medications   Medication Sig Dispense Refill    amLODIPine 5 MG Oral Tab Take 1 tablet (5 mg total) by mouth daily. 90 tablet 3    diazePAM 2 MG Oral Tab Take one tab (2 mg) 30 minutes prior to MRI for anxiety, may repeat once during MRI if needed. Do not drive after taking for remainder of day/get a ride home.). (Patient not taking: Reported on 2025) 2 tablet 0       Allergies[1]    Family History   Problem Relation Age of Onset    Cancer Mother         Breast cancer    Breast Cancer Mother 75        estimated age    Cancer Father         Lung cancer    Dementia Sister         Alzheimers    Diabetes Sister         Alzheimers    Diabetes Brother     Other (vagina or uterine cancer) Maternal Grandmother     Ear Problems Neg     Allergies Neg     Bleeding Disorders Neg     Clotting  Disorder Neg     Thyroid disease Neg     Hypertension Neg     Asthma Neg     Arthritis Neg     Anesthesia Problems  Neg        Social History     Occupational History    Not on file   Tobacco Use    Smoking status: Never     Passive exposure: Past (both parents smoked in home when patient was a child)    Smokeless tobacco: Never    Tobacco comments:     Job:  Works at [employer]   Vaping Use    Vaping status: Never Used   Substance and Sexual Activity    Alcohol use: No    Drug use: Never    Sexual activity: Not on file        Review of Systems (negative unless bolded):  General: fevers, chills, fatigue  CV:  chest pain, palpitations, leg swelling  Msk: bodyaches, neck pain, neck stiffness  Skin: rashes, open wounds, nonhealing ulcers  Hem: bleeds easily, bruise easily, immunocompromised  Neuro: dizziness, light headedness, headaches  Psych: anxious, depressed, anger issues  Genevieve Hernandez PA-C  Hand, Wrist, & Elbow Surgery  Physician Assistant to Dr. Nabor [email or web address]  t: [phone]  f: [phone]          [1]   Allergies  Allergen Reactions    Aspirin HIVES    Hydrocodone UNKNOWN    Nsaids HIVES

## 2025-02-18 ENCOUNTER — MED REC SCAN ONLY (OUTPATIENT)
Dept: ORTHOPEDICS CLINIC | Facility: CLINIC | Age: 75
End: 2025-02-18

## 2025-02-20 ENCOUNTER — TELEPHONE (OUTPATIENT)
Dept: ORTHOPEDICS CLINIC | Facility: CLINIC | Age: 75
End: 2025-02-20

## 2025-02-20 ENCOUNTER — HOSPITAL ENCOUNTER (OUTPATIENT)
Dept: BONE DENSITY | Age: 75
Discharge: HOME | End: 2025-02-20
Attending: FAMILY MEDICINE
Payer: MEDICARE

## 2025-02-20 DIAGNOSIS — Z78.0 MENOPAUSE: ICD-10-CM

## 2025-02-20 DIAGNOSIS — S52.121A CLOSED DISPLACED FRACTURE OF HEAD OF RIGHT RADIUS, INITIAL ENCOUNTER: Primary | ICD-10-CM

## 2025-02-20 DIAGNOSIS — M85.80 OSTEOPENIA, UNSPECIFIED LOCATION: ICD-10-CM

## 2025-02-20 PROCEDURE — 77080 DXA BONE DENSITY AXIAL: CPT | Performed by: FAMILY MEDICINE

## 2025-02-21 ENCOUNTER — OFFICE VISIT (OUTPATIENT)
Dept: ORTHOPEDICS CLINIC | Facility: CLINIC | Age: 75
End: 2025-02-21
Payer: MEDICARE

## 2025-02-21 ENCOUNTER — HOSPITAL ENCOUNTER (OUTPATIENT)
Dept: GENERAL RADIOLOGY | Age: 75
Discharge: HOME | End: 2025-02-21
Attending: PHYSICIAN ASSISTANT
Payer: MEDICARE

## 2025-02-21 VITALS — WEIGHT: 137 LBS | BODY MASS INDEX: 26.9 KG/M2 | HEIGHT: 60 IN

## 2025-02-21 DIAGNOSIS — S52.121A CLOSED DISPLACED FRACTURE OF HEAD OF RIGHT RADIUS, INITIAL ENCOUNTER: ICD-10-CM

## 2025-02-21 DIAGNOSIS — S52.121A CLOSED DISPLACED FRACTURE OF HEAD OF RIGHT RADIUS, INITIAL ENCOUNTER: Primary | ICD-10-CM

## 2025-02-21 PROCEDURE — 73080 X-RAY EXAM OF ELBOW: CPT | Performed by: PHYSICIAN ASSISTANT

## 2025-02-21 PROCEDURE — 99213 OFFICE O/P EST LOW 20 MIN: CPT | Performed by: PHYSICIAN ASSISTANT

## 2025-02-21 NOTE — PROGRESS NOTES
Clinic Note [facility]     Assessment/Plan:  75 year old female    Left elbow radial head fracture, minimally displaced-CT scan confirmed minimally displaced-signs of healing on x-ray with minimal pain on exam.  She can progress to activities as tolerated.  She will continue with therapy for the next 4 to 6 weeks working on range of motion and strengthening.  All of her questions were answered.    Follow Up: As needed    Diagnostic Studies:  X-rays reveal left elbow radial head fracture with signs of healing  Physical Exam:    Ht 5' (1.524 m)   Wt 137 lb (62.1 kg)   BMI 26.76 kg/m²     Constitutional: NAD. AOx3. Well-developed and Well-nourished.   Psychiatric: Normal mood/ affect/ behavior. Judgment and thought content normal.     Left upper Extremity:   Inspection: Skin Intact. No skin lesions. No gross deformity.   Palpation: Mildly tender palpation along the fracture site.  Nontender over the wrist.   Motion: Elbow: 20/150  Wrist: normal bilateral symmetric ext/flex/sup/pro  Finger: full composite fist       CC: Follow - Up (RT ELBOW)        HPI: This 75 year old female presents with complaints of left elbow pain.  On 1/10/2025 she slipped on ice and landed on her elbow.  She has had pain and swelling.  She went to urgent care and they told her follow-up with orthopedics.  She has been in a sling since then.      Interval history: Patient returns for follow-up regarding her elbow fracture.      Past Medical History:    Abdominal distention    Abdominal hernia    Abdominal pain    Also 8/27/2021    Allergic rhinitis    Arthritis    Fingers    Belching    Bloating    Body piercing    Constipation    Decorative tattoo    Easy bruising    Essential hypertension    Flatulence/gas pain/belching    Food intolerance    Milk, ice cream    Headache disorder    Several times per week    Heart palpitations    Occasionally    Hemorrhoids    High blood pressure    History of stomach ulcers    Irregular bowel habits  right ventricle Cine(s)  injectedTotal Volume (mL) 4  15 Tristanian/ 0 CRA and 90 Tristanian/ 0 CAU     Nausea    Presence of other cardiac implants and grafts    [place] in right toe    Sleep disturbance    Uncomfortable fullness after meals    Visual impairment    glaases    Wears glasses    Glasses       Past Surgical History:   Procedure Laterality Date    Arthroscopy of joint unlisted Right     knee    Colonoscopy  2015    normal; repeat 10 yrs    Colonoscopy      Every 10 years    Colonoscopy,diagnostic N/A 2015    Procedure: COLONOSCOPY, POSSIBLE BIOPSY, POSSIBLE POLYPECTOMY 02669;  Surgeon: Kevin Vivas MD;  [facility]    D & c  [facility]    Needle biopsy left  2023    Stromal fibrosis      Aug. 1972    Other surgical history      bilateral feet sx-local    Tubal ligation         Current Outpatient Medications   Medication Sig Dispense Refill    amLODIPine 5 MG Oral Tab Take 1 tablet (5 mg total) by mouth daily. 90 tablet 3    diazePAM 2 MG Oral Tab Take one tab (2 mg) 30 minutes prior to MRI for anxiety, may repeat once during MRI if needed. Do not drive after taking for remainder of day/get a ride home.). (Patient not taking: Reported on 2025) 2 tablet 0       Allergies[1]    Family History   Problem Relation Age of Onset    Cancer Mother         Breast cancer    Breast Cancer Mother 75        estimated age    Cancer Father         Lung cancer    Dementia Sister         Alzheimers    Diabetes Sister         Alzheimers    Diabetes Brother     Other (vagina or uterine cancer) Maternal Grandmother     Ear Problems Neg     Allergies Neg     Bleeding Disorders Neg     Clotting Disorder Neg     Thyroid disease Neg     Hypertension Neg     Asthma Neg     Arthritis Neg     Anesthesia Problems  Neg        Social History     Occupational History    Not on file   Tobacco Use    Smoking status: Never     Passive exposure: Past (both parents smoked in home when patient was a child)    Smokeless tobacco: Never    Tobacco comments:     Job:  Works at [employer]    Vaping Use    Vaping status: Never Used   Substance and Sexual Activity    Alcohol use: No    Drug use: Never    Sexual activity: Not on file        Review of Systems (negative unless bolded):  General: fevers, chills, fatigue  CV:  chest pain, palpitations, leg swelling  Msk: bodyaches, neck pain, neck stiffness  Skin: rashes, open wounds, nonhealing ulcers  Hem: bleeds easily, bruise easily, immunocompromised  Neuro: dizziness, light headedness, headaches  Psych: anxious, depressed, anger issues  Genevieve Hernandez PA-C  Hand, Wrist, & Elbow Surgery  Physician Assistant to Dr. Nabor [email or web address]  t: [phone]  f: [phone]            [1]   Allergies  Allergen Reactions    Aspirin HIVES    Hydrocodone UNKNOWN    Nsaids HIVES

## 2025-02-28 ENCOUNTER — TELEMEDICINE (OUTPATIENT)
Dept: INTERNAL MEDICINE CLINIC | Facility: CLINIC | Age: 75
End: 2025-02-28
Payer: MEDICARE

## 2025-02-28 DIAGNOSIS — Z91.89 HIGH RISK FOR HIP FRACTURE: ICD-10-CM

## 2025-02-28 DIAGNOSIS — M85.80 OSTEOPENIA, UNSPECIFIED LOCATION: Primary | ICD-10-CM

## 2025-02-28 PROCEDURE — 99214 OFFICE O/P EST MOD 30 MIN: CPT | Performed by: FAMILY MEDICINE

## 2025-03-02 NOTE — PROGRESS NOTES
Subjective:   Patient ID: Silvia Rodríguez is a 75 year old female.    HPI Video visit for f/u on bone density scan. Patient is not taking vitamin D. Walks for exercise.     History/Other:   Review of Systems   Constitutional:  Negative for chills and fever.   Neurological:  Negative for weakness and numbness.     Current Outpatient Medications   Medication Sig Dispense Refill    diazePAM 2 MG Oral Tab Take one tab (2 mg) 30 minutes prior to MRI for anxiety, may repeat once during MRI if needed. Do not drive after taking for remainder of day/get a ride home.). (Patient not taking: Reported on 2/21/2025) 2 tablet 0    amLODIPine 5 MG Oral Tab Take 1 tablet (5 mg total) by mouth daily. 90 tablet 3     Allergies:Allergies[1]    Objective:   Physical Exam  Constitutional:       Appearance: Normal appearance. She is well-developed.   HENT:      Head: Normocephalic and atraumatic.   Pulmonary:      Effort: Pulmonary effort is normal.   Neurological:      Mental Status: She is alert.   Psychiatric:         Mood and Affect: Mood normal.         Behavior: Behavior normal.         Assessment & Plan:   1. Osteopenia, unspecified location    2. High risk for hip fracture    Reviewed DEXA results with hip fracture risk >3%. Check vitamin D and will add vitamin D if low. Start Alendronate. Discussed risks/benefits/potential side effects and proper use of medication.   Recheck bone density in one year. Continue weight-bearing exercise.     Orders Placed This Encounter   Procedures    Vitamin D [E]       Meds This Visit:  Requested Prescriptions      No prescriptions requested or ordered in this encounter       Imaging & Referrals:  None         [1]   Allergies  Allergen Reactions    Aspirin HIVES    Hydrocodone UNKNOWN    Nsaids HIVES

## 2025-03-12 DIAGNOSIS — N60.92 ATYPICAL LOBULAR HYPERPLASIA (ALH) OF LEFT BREAST: Primary | ICD-10-CM

## 2025-03-12 DIAGNOSIS — D24.2 BENIGN NEOPLASM OF LEFT BREAST: ICD-10-CM

## 2025-03-18 ENCOUNTER — OFFICE VISIT (OUTPATIENT)
Facility: CLINIC | Age: 75
End: 2025-03-18
Payer: MEDICARE

## 2025-03-18 VITALS
SYSTOLIC BLOOD PRESSURE: 108 MMHG | WEIGHT: 142 LBS | DIASTOLIC BLOOD PRESSURE: 74 MMHG | HEIGHT: 60 IN | BODY MASS INDEX: 27.88 KG/M2 | OXYGEN SATURATION: 98 % | HEART RATE: 69 BPM | RESPIRATION RATE: 16 BRPM

## 2025-03-18 DIAGNOSIS — R91.8 PULMONARY NODULES: Primary | ICD-10-CM

## 2025-03-18 DIAGNOSIS — G25.81 RLS (RESTLESS LEGS SYNDROME): ICD-10-CM

## 2025-03-18 PROCEDURE — 99214 OFFICE O/P EST MOD 30 MIN: CPT | Performed by: INTERNAL MEDICINE

## 2025-03-18 NOTE — PATIENT INSTRUCTIONS
Plan-- to get repeat attempt at overnight testing           - continue  magnesium glycinate 400mg  every evening           - CT scan in April -- call for results          - see me in 6  months             -    Liv Perez MD  Pulmonary Medicine  3/18/2025

## 2025-03-18 NOTE — PROGRESS NOTES
Pulmonary Consult Note    History of Present Illness:  Silvia Rodríguez is a 75 year old female presenting to pulmonary clinic today for pulmonary nodule  Had scans for 2 yrs - for GI issues - ulcers and herna - found to have nodules - - 6 month follow up - with sl increase   Tried to be kidney donor- rejected with less kidney function-   Never smoked - secondhand - growing up- dad  from lung cancer   No hx asthma or COPD_ -   Cathi-   Asa and NSAID hives- as adult age 20s - no asthma   No illness or hx of sickness as a kid   No regular exercise     - following with surgeon- revisit in April-   No issues -   Saw GI-- with polyps removed adenomas  No issues - feels well- saw dr king without change   Feels fine-   No coughing - no shortness of breath    Off hydrochlorothiazide now   Out of breath with the stairs - since started hydrochlorothiazide so now off and better - no dyspnea now at all     - saw GI-- found large polyps - not the cause -   Saw dr espinoza- and had 6 month followup- - for MRI--  -no issues   Feels good- - partner - had transplant -- then needed stents -   No dyspnea at all - 2-3 flights of stairs feels sl winded- at most   No coughing -- no mucus   No cp   Denies recent viral infection  10/24- found meningoma- and not causing pain-- lower right jaw pain- with spitting out or warm water- no meds- comes and goes   Trigeminal neuralgia  No gi issues - no further studies recommended - found nothing to explain PET scan -   Grizek- once a year  mamms   No dyspnea - no exercise htough stairs at home   No coughing     3/25 - fell in [place] and broke arm - remains in PT - had bone density -- to [facility] dr clement   Some dsypnea with stairs while carrying groceries -   Otherwise no issues-- no exercise -   No cough no mucus no sinus issues   Never did overnight- no recorded - machine not working-- never got another                    Past Medical History:   Past Medical History:    Abdominal  distention    Abdominal hernia    Abdominal pain    Also 2021    Allergic rhinitis    Arthritis    Fingers    Belching    Bloating    Body piercing    Constipation    Decorative tattoo    Easy bruising    Essential hypertension    Flatulence/gas pain/belching    Food intolerance    Milk, ice cream    Headache disorder    Several times per week    Heart palpitations    Occasionally    Hemorrhoids    High blood pressure    History of stomach ulcers    Irregular bowel habits    Nausea    Presence of other cardiac implants and grafts    [place] in right toe    Sleep disturbance    Uncomfortable fullness after meals    Visual impairment    glaases    Wears glasses    Glasses    Recent HTN- - when renal donor eval  Hx ulcers 2 yrs ago -- HH - - now off PPI -  No reflux or heartburn-   No cancers   No clots -   No AFSANEH-     Past Surgical History:   Past Surgical History:   Procedure Laterality Date    Arthroscopy of joint unlisted Right     knee    Colonoscopy  2015    normal; repeat 10 yrs    Colonoscopy      Every 10 years    Colonoscopy,diagnostic N/A 2015    Procedure: COLONOSCOPY, POSSIBLE BIOPSY, POSSIBLE POLYPECTOMY 68727;  Surgeon: Kevin Vivas MD;  Location: [facility] [place], [facility]    D & c  1971    Needle biopsy left  2023    Stromal fibrosis      Aug. 1972    Other surgical history      bilateral feet sx-local    Tubal ligation         Family Medical History:   Family History   Problem Relation Age of Onset    Cancer Mother         Breast cancer    Breast Cancer Mother 75        estimated age    Cancer Father         Lung cancer    Dementia Sister         Alzheimers    Diabetes Sister         Alzheimers    Diabetes Brother     Other (vagina or uterine cancer) Maternal Grandmother     Ear Problems Neg     Allergies Neg     Bleeding Disorders Neg     Clotting Disorder Neg     Thyroid disease Neg     Hypertension Neg     Asthma Neg     Arthritis Neg     Anesthesia Problems  Neg         Social History: Social History    Socioeconomic History      Marital status: Life Partner    Tobacco Use      Smoking status: Never      Smokeless tobacco: Never      Tobacco comments: Job:  Works at     Vaping Use      Vaping Use: Never used    Substance and Sexual Activity      Alcohol use: No      Drug use: Never  Retired- realtor and [employer] -   No pets       Allergies: Aspirin, Hydrocodone, and Nsaids     Medications:   Current Outpatient Medications   Medication Sig Dispense Refill    diazePAM 2 MG Oral Tab Take one tab (2 mg) 30 minutes prior to MRI for anxiety, may repeat once during MRI if needed. Do not drive after taking for remainder of day/get a ride home.). (Patient not taking: Reported on 3/18/2025) 2 tablet 0    amLODIPine 5 MG Oral Tab Take 1 tablet (5 mg total) by mouth daily. 90 tablet 3       Review of Systems: weight - stable - sl up - stable   No swallowing issues at all   No sore throat   No skin lesions or hives or rashes --  Had breast bx- on Friday - first bx- - was neg - for US in am - neg bx-- now growing-- for removal -- 4/26 -- grizek - for yearly   No allergies or sinus issues   No gerd - some burping -hx  ulcer and HH - still with burping - no ppi - or meds   Sleeping - mind races better now- 4 hours - nocturia -not a great sleeper -   Pain in rt jaw-- as above   Hard to get to sleep - melatonin - benadryl - awakens at 3 and is awake - thinks has RLS - constantly moving - needs to move legs - watching TV and while in bed     Physical Exam:  /74 (BP Location: Right arm, Patient Position: Sitting, Cuff Size: adult)   Pulse 69   Resp 16   Ht 5' (1.524 m)   Wt 142 lb (64.4 kg)   SpO2 98%   BMI 27.73 kg/m²    Constitutional: comfortable . No acute distress.   HEENT: Head NC/AT. PEERL. Throat is clear no lesions   Cardio: Regular rate and rhythm. Normal S1 and S2.  Short murmur at time no ectopy no rub  Respiratory: Thorax  symmetrical with no labored breathing. . No wheezing, rhonchi, rales, or crackles.   GI:  Abd soft, non-tender.  Extremities: No clubbing or cyanosis. No LE edema. No calf tenderness.VV  Neurologic: A&Ox3. No gross motor deficits.  Skin: Warm, dry.no rashes or hives noted   Lymphatic: No cervical or supraclavicular lymphadenopathy.no jvd   Psych: Calm, cooperative. Pleasant affect.    Results:  Reviewed     Assessment/Plan:  # pulmonary nodule   Right lower lobe-- endobronchial-  Reviewed with radiology-increased in size and was present April 2022 and February 23--plan to proceed with PET scan  12/23 PET scan negative for uptake with the exception of the anal sphincter,  12.23 for repeat at 6 months   4/24- mucus plug now smaller -- for follow up -at 6 months   No evidence of broncholith noted  10/24- due for CT schedule  3/25-plan for follow-up CT in April      # Pet -Positive anal lesions  Saw GI and is following with surgery-polyps grade 2 internal hemorrhoids and external anal skin tags  4/24- multip[le evals with polyps found more proximally- not explaining with plans for MRI   10.24- no issues - scope in 3 yrs - 3 polyps found         #History of ulcers and hiatal hernia  Denies any signs or symptoms of aspiration/recurrent GERD  Hx bacterial with breath - treated - recently off bactrim - tried xifaxan - too expensive - then bactrim-- no ppi -initiated for bad breath SOM      #Borderline hypertension  Recent treatment-initially for hopes for kidney donor    # pericardial effusion - sl increase on ct   To check echo - no sx   10/24- echo with trivial 9/24 - no issues - -- mild MR     # Atypical lobular hyperplasia excisional biopsy 4/26/2024  Following with Dr. Kimball    # RLS   Mom with AFSANEH-- plan for overnight and blood work   3/25-- thinks magnesium really helps with RLS   Awaiting overnight oximetry rule out AFSANEH    # trigeminal neuraligia-localized pain along the right jaw  For past year - recommended to  take antisz meds   Comes and goes she prefers to hold off on treatment      Plan-- to get repeat attempt at overnight testing           - continue  magnesium glycinate 400mg  every evening           - CT scan in April -- call for results          - see me in 6  months             -    Liv Perez MD  Pulmonary Medicine  3/18/2025

## 2025-03-19 ENCOUNTER — LAB ENCOUNTER (OUTPATIENT)
Dept: LAB | Facility: HOSPITAL | Age: 75
End: 2025-03-19
Attending: INTERNAL MEDICINE
Payer: MEDICARE

## 2025-03-19 ENCOUNTER — HOSPITAL ENCOUNTER (OUTPATIENT)
Dept: CT IMAGING | Facility: HOSPITAL | Age: 75
Discharge: HOME | End: 2025-03-19
Attending: INTERNAL MEDICINE
Payer: MEDICARE

## 2025-03-19 DIAGNOSIS — M85.80 OSTEOPENIA, UNSPECIFIED LOCATION: ICD-10-CM

## 2025-03-19 DIAGNOSIS — R91.8 PULMONARY NODULES: ICD-10-CM

## 2025-03-19 LAB — VIT D+METAB SERPL-MCNC: 27.9 NG/ML (ref 30–100)

## 2025-03-19 PROCEDURE — 36415 COLL VENOUS BLD VENIPUNCTURE: CPT

## 2025-03-19 PROCEDURE — 71250 CT THORAX DX C-: CPT | Performed by: INTERNAL MEDICINE

## 2025-03-19 PROCEDURE — 82306 VITAMIN D 25 HYDROXY: CPT

## 2025-03-20 RX ORDER — ALENDRONATE SODIUM 10 MG/1
10 TABLET ORAL
Qty: 90 TABLET | Refills: 3 | Status: SHIPPED | OUTPATIENT
Start: 2025-03-20

## 2025-03-21 ENCOUNTER — OFFICE VISIT (OUTPATIENT)
Dept: PODIATRY CLINIC | Facility: CLINIC | Age: 75
End: 2025-03-21
Payer: MEDICARE

## 2025-03-21 DIAGNOSIS — L84 HELOMA MOLLE: ICD-10-CM

## 2025-03-21 DIAGNOSIS — L03.032 CELLULITIS OF LEFT TOE: Primary | ICD-10-CM

## 2025-03-21 DIAGNOSIS — M20.5X2 ADDUCTOVARUS ROTATION OF TOE, ACQUIRED, LEFT: ICD-10-CM

## 2025-03-21 DIAGNOSIS — M79.675 TOE PAIN, LEFT: ICD-10-CM

## 2025-03-21 PROCEDURE — 99203 OFFICE O/P NEW LOW 30 MIN: CPT | Performed by: STUDENT IN AN ORGANIZED HEALTH CARE EDUCATION/TRAINING PROGRAM

## 2025-03-23 NOTE — PROGRESS NOTES
[facility] Podiatry  Progress Note    Silvia Rodríguez is a 75 year old female.   Chief Complaint   Patient presents with    Consult     Left 5th digit- patient states she was on vacation and her L 5th digit is painful and swollen- putting on a shoe compresses the 5th digit- rates pain 6/10         HPI:     Patient is a pleasant 75-year-old female who presents to clinic for evaluation of swollen left fifth toe.  She relates that she was on vacation and her fifth toe did become red, painful, and swollen.  She relates that putting on his shoe compresses the fifth toe causes pain.  She rates her pain at a 6 out of 10.  Denies any recent trauma or injury.  She presents today for evaluation.  No other complaints are mentioned.  Past medical history, medications, and allergies reviewed.      Allergies: Aspirin, Hydrocodone, and Nsaids   Current Outpatient Medications   Medication Sig Dispense Refill    amoxicillin clavulanate 875-125 MG Oral Tab Take 1 tablet by mouth 2 (two) times daily. 20 tablet 0    alendronate 10 MG Oral Tab Take 1 tablet (10 mg total) by mouth every morning before breakfast. 90 tablet 3    amLODIPine 5 MG Oral Tab Take 1 tablet (5 mg total) by mouth daily. 90 tablet 3    diazePAM 2 MG Oral Tab Take one tab (2 mg) 30 minutes prior to MRI for anxiety, may repeat once during MRI if needed. Do not drive after taking for remainder of day/get a ride home.). (Patient not taking: Reported on 3/18/2025) 2 tablet 0      Past Medical History:    Abdominal distention    Abdominal hernia    Abdominal pain    Also 8/27/2021    Allergic rhinitis    Arthritis    Fingers    Belching    Bloating    Body piercing    Constipation    Decorative tattoo    Easy bruising    Essential hypertension    Flatulence/gas pain/belching    Food intolerance    Milk, ice cream    Headache disorder    Several times per week    Heart palpitations    Occasionally    Hemorrhoids    High blood pressure    History of stomach ulcers     Irregular bowel habits    Nausea    Presence of other cardiac implants and grafts    [place] in right toe    Sleep disturbance    Uncomfortable fullness after meals    Visual impairment    glaases    Wears glasses    Glasses      Past Surgical History:   Procedure Laterality Date    Arthroscopy of joint unlisted Right     knee    Colonoscopy  2015    normal; repeat 10 yrs    Colonoscopy      Every 10 years    Colonoscopy,diagnostic N/A 2015    Procedure: COLONOSCOPY, POSSIBLE BIOPSY, POSSIBLE POLYPECTOMY 73247;  Surgeon: Kevin Vivas MD;  [facility]: [facility]    Needle biopsy left  2023    Stromal fibrosis      Aug. 1972    Other surgical history      bilateral feet sx-local    Tubal ligation        Family History   Problem Relation Age of Onset    Cancer Mother         Breast cancer    Breast Cancer Mother 75        estimated age    Cancer Father         Lung cancer    Dementia Sister         Alzheimers    Diabetes Sister         Alzheimers    Diabetes Brother     Other (vagina or uterine cancer) Maternal Grandmother     Ear Problems Neg     Allergies Neg     Bleeding Disorders Neg     Clotting Disorder Neg     Thyroid disease Neg     Hypertension Neg     Asthma Neg     Arthritis Neg     Anesthesia Problems  Neg       Social History     Socioeconomic History    Marital status: Engaged   Tobacco Use    Smoking status: Never     Passive exposure: Past (both parents smoked in home when patient was a child)    Smokeless tobacco: Never    Tobacco comments:     Job:  Works at [employer]   Vaping Use    Vaping status: Never Used   Substance and Sexual Activity    Alcohol use: No    Drug use: Never   Other Topics Concern    Caffeine Concern Yes     Comment: Coffee    Exercise No    Seat Belt No    Special Diet No    Stress Concern No    Weight Concern No           REVIEW OF SYSTEMS:     No n/v/f/c.      EXAM:   There were no vitals taken for this visit.  GENERAL:  well developed, well nourished, in no apparent distress  EXTREMITIES:   1. Integument: Normal skin temperature and turgor.  Hyperkeratotic buildup with some skin breakdown to the medial portion of fifth toe/fourth interspace consistent with heloma molle.  Some cellulitic changes to left fifth toe.  2. Vascular: Dorsalis pedis two out of four bilateral and posterior tibial pulses two out of   four bilateral, capillary refill normal. Edema to left 5th toe.   3. Musculoskeletal: All muscle groups are graded 5 out of 5 in the foot and ankle. Pain to left 5th toe. Adductovarus rotation of toe.    4. Neurological: Normal sharp dull sensation; reflexes normal.          ASSESSMENT AND PLAN:   Diagnoses and all orders for this visit:    Cellulitis of left toe  -     [facility] AMB POD XR - LT FOOT 3 VIEWS(AP,LAT,OBLIQUE) WT BEARING  -     amoxicillin clavulanate 875-125 MG Oral Tab; Take 1 tablet by mouth 2 (two) times daily.    Adductovarus rotation of toe, acquired, left    Toe pain, left    Heloma molle        Plan:    -Patient examined, chart history reviewed.  -Discussed etiology of condition and various treatment options.  -X-rays obtained reviewed.  No acute fractures or osseous abnormalities noted.  Adductovarus fifth toe.  -Clinically patient does have heloma molle to site.  Discussed with patient I believe her symptoms are due to some cellulitic changes to her fifth toe.  Pared HPK to mostly healthy tissue.  Painted to fourth interspace with Betadine.  Patient should receive similar dressing changes daily.  -Rx Augmentin.  -Patient will notify me of how she is feeling on Monday.  If no improvement may consider ordering MRI for further evaluation.  -All questions answered to satisfaction.  Appreciate the opportunity precipitate patient's care.    The patient indicates understanding of these issues and agrees to the plan.        Fuentes Casillas DPM    Dragon speech recognition software was used to prepare this note.  Errors in  word recognition may occur.  Please contact me with any questions/concerns with this note.

## 2025-03-24 ENCOUNTER — PATIENT MESSAGE (OUTPATIENT)
Dept: PODIATRY CLINIC | Facility: CLINIC | Age: 75
End: 2025-03-24

## 2025-03-28 NOTE — TELEPHONE ENCOUNTER
Noted, thank you. Can we please offer visit with Dr. Kong next week to ensure site is resolving? Thank you.

## 2025-03-28 NOTE — TELEPHONE ENCOUNTER
Called patient and scheduled appointment on 4/1 at 10am with Dr Kong for follow up. She had no further concerns.

## 2025-03-31 ENCOUNTER — TELEPHONE (OUTPATIENT)
Dept: NEUROLOGY | Facility: CLINIC | Age: 75
End: 2025-03-31

## 2025-04-01 ENCOUNTER — OFFICE VISIT (OUTPATIENT)
Dept: PODIATRY CLINIC | Facility: CLINIC | Age: 75
End: 2025-04-01
Payer: MEDICARE

## 2025-04-01 VITALS — SYSTOLIC BLOOD PRESSURE: 130 MMHG | DIASTOLIC BLOOD PRESSURE: 66 MMHG

## 2025-04-01 DIAGNOSIS — G57.62 MORTON'S METATARSALGIA, NEURALGIA, OR NEUROMA, LEFT: ICD-10-CM

## 2025-04-01 DIAGNOSIS — L84 HELOMA MOLLE: ICD-10-CM

## 2025-04-01 DIAGNOSIS — M20.5X2 ADDUCTOVARUS ROTATION OF TOE, ACQUIRED, LEFT: Primary | ICD-10-CM

## 2025-04-01 DIAGNOSIS — G57.82 NEUROMA OF THIRD INTERSPACE OF LEFT FOOT: ICD-10-CM

## 2025-04-01 PROCEDURE — 99213 OFFICE O/P EST LOW 20 MIN: CPT | Performed by: PODIATRIST

## 2025-04-01 PROCEDURE — 64455 NJX AA&/STRD PLTR COM DG NRV: CPT | Performed by: PODIATRIST

## 2025-04-01 RX ORDER — TRIAMCINOLONE ACETONIDE 40 MG/ML
20 INJECTION, SUSPENSION INTRA-ARTICULAR; INTRAMUSCULAR ONCE
Status: COMPLETED | OUTPATIENT
Start: 2025-04-01 | End: 2025-04-01

## 2025-04-01 NOTE — PROGRESS NOTES
Silvia Rodríguez is a 75 year old female.   Chief Complaint   Patient presents with    Follow - Up     Left 5th digit- patient denies pain- discomfort on the top the of foot-          HPI:   This patient presents for checkup on her left fifth toe she is not having any pain she is got some discomfort on the top of her left foot however.  At today's visit reviewed nurse's history as taken above, allergies medications and medical history as documented below.  All changes duly noted  Allergies: Aspirin, Hydrocodone, and Nsaids   Current Outpatient Medications   Medication Sig Dispense Refill    amoxicillin clavulanate 875-125 MG Oral Tab Take 1 tablet by mouth 2 (two) times daily. 20 tablet 0    alendronate 10 MG Oral Tab Take 1 tablet (10 mg total) by mouth every morning before breakfast. 90 tablet 3    amLODIPine 5 MG Oral Tab Take 1 tablet (5 mg total) by mouth daily. 90 tablet 3    diazePAM 2 MG Oral Tab Take one tab (2 mg) 30 minutes prior to MRI for anxiety, may repeat once during MRI if needed. Do not drive after taking for remainder of day/get a ride home.). (Patient not taking: Reported on 3/18/2025) 2 tablet 0      Past Medical History:    Abdominal distention    Abdominal hernia    Abdominal pain    Also 8/27/2021    Allergic rhinitis    Arthritis    Fingers    Belching    Bloating    Body piercing    Constipation    Decorative tattoo    Easy bruising    Essential hypertension    Flatulence/gas pain/belching    Food intolerance    Milk, ice cream    Headache disorder    Several times per week    Heart palpitations    Occasionally    Hemorrhoids    High blood pressure    History of stomach ulcers    Irregular bowel habits    Nausea    Presence of other cardiac implants and grafts    [place] in right toe    Sleep disturbance    Uncomfortable fullness after meals    Visual impairment    glaases    Wears glasses    Glasses      Past Surgical History:   Procedure Laterality Date    Arthroscopy of joint unlisted Right     knee     Colonoscopy  2015    normal; repeat 10 yrs    Colonoscopy      Every 10 years    Colonoscopy,diagnostic N/A 2015    Procedure: COLONOSCOPY, POSSIBLE BIOPSY, POSSIBLE POLYPECTOMY 98302;  Surgeon: Kevin Vvias MD;  Location: [facility], [facility]    D & c  1971    Needle biopsy left  2023    Stromal fibrosis      Aug. 1972    Other surgical history      bilateral feet sx-local    Tubal ligation  1981      Family History   Problem Relation Age of Onset    Cancer Mother         Breast cancer    Breast Cancer Mother 75        estimated age    Cancer Father         Lung cancer    Dementia Sister         Alzheimers    Diabetes Sister         Alzheimers    Diabetes Brother     Other (vagina or uterine cancer) Maternal Grandmother     Ear Problems Neg     Allergies Neg     Bleeding Disorders Neg     Clotting Disorder Neg     Thyroid disease Neg     Hypertension Neg     Asthma Neg     Arthritis Neg     Anesthesia Problems  Neg       Social History     Socioeconomic History    Marital status: Engaged   Tobacco Use    Smoking status: Never     Passive exposure: Past (both parents smoked in home when patient was a child)    Smokeless tobacco: Never    Tobacco comments:     Job:  Works at [employer]   Vaping Use    Vaping status: Never Used   Substance and Sexual Activity    Alcohol use: No    Drug use: Never   Other Topics Concern    Caffeine Concern Yes     Comment: Coffee    Exercise No    Seat Belt No    Special Diet No    Stress Concern No    Weight Concern No           REVIEW OF SYSTEMS:   Today reviewed systens as documented below  GENERAL HEALTH: feels well otherwise  SKIN: Refer to exam below  RESPIRATORY: denies shortness of breath with exertion  CARDIOVASCULAR: denies chest pain on exertion  GI: denies abdominal pain and denies heartburn  NEURO: denies headaches    EXAM:   /66 (BP Location: Right arm, Patient Position: Sitting, Cuff Size: adult)   GENERAL: well developed, well  nourished, in no apparent distress  EXTREMITIES:   1. Integument: The skin on the feet was examined its warm and dry there are no sores or ulcerations noted at this time bilateral the left fifth toe still has a little swelling and it still has little peeling from where the edema was but there is no infection no blistering and no sign of infection.   2. Vascular: Patient has again palpable pulses I reviewed Dr. Casillas's notes there are no changes here   3. Neurologic: Patient has intact sensorium there are no deficits noted   4. Musculoskeletal: Patient has good sense sensation good muscle strength.  Positive Bladimir's test third interspace left foot.    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  Recommended a cortisone injection.  The patient was consented for and after timeout was taken a cortisone injection with peripheral nerve block was administered into the third interspace of the left foot.  This was accomplished utilizing 20 mg of Kenalog and 0.5 cc of 0.5% Marcaine plain as a peripheral nerve block and a Band-Aid was applied to this injection site the patient should not walk barefoot for about 6 to 8 hours and follow-up with either myself or Dr. Casillas again in 3 to 4 weeks to see how effective the injection was.    The patient indicates understanding of these issues and agrees to the plan.    Donnell Kong DPM

## 2025-04-04 ENCOUNTER — HOSPITAL ENCOUNTER (OUTPATIENT)
Dept: MAMMOGRAPHY | Facility: HOSPITAL | Age: 75
Discharge: HOME | End: 2025-04-04
Attending: SURGERY
Payer: MEDICARE

## 2025-04-04 DIAGNOSIS — D24.2 BENIGN NEOPLASM OF LEFT BREAST: ICD-10-CM

## 2025-04-04 DIAGNOSIS — N60.92 ATYPICAL LOBULAR HYPERPLASIA (ALH) OF LEFT BREAST: ICD-10-CM

## 2025-04-04 PROCEDURE — 77062 BREAST TOMOSYNTHESIS BI: CPT

## 2025-04-04 PROCEDURE — 77066 DX MAMMO INCL CAD BI: CPT

## 2025-04-09 ENCOUNTER — TELEPHONE (OUTPATIENT)
Facility: CLINIC | Age: 75
End: 2025-04-09

## 2025-04-09 NOTE — TELEPHONE ENCOUNTER
Discussed with Dr. Perez. Per provider, will enter CT order at patient's next appt which is scheduled 9/16/25. Patient notified via [employer] message.

## 2025-04-16 ENCOUNTER — OFFICE VISIT (OUTPATIENT)
Dept: PODIATRY CLINIC | Facility: CLINIC | Age: 75
End: 2025-04-16
Payer: MEDICARE

## 2025-04-16 DIAGNOSIS — G57.82 NEUROMA OF THIRD INTERSPACE OF LEFT FOOT: Primary | ICD-10-CM

## 2025-04-16 DIAGNOSIS — G57.62 MORTON'S METATARSALGIA, NEURALGIA, OR NEUROMA, LEFT: ICD-10-CM

## 2025-04-16 PROCEDURE — 99213 OFFICE O/P EST LOW 20 MIN: CPT | Performed by: PODIATRIST

## 2025-04-19 NOTE — PROGRESS NOTES
Silvia Rodríguez is a 75 year old female.   Chief Complaint   Patient presents with    Follow - Up     Left foot injection given - states foot feels great         HPI:   Patient returns to the clinic she is not having any pain the injection alleviated her symptoms for the painful neuroma on her left foot.  At today's visit reviewed nurse's history as taken above, allergies medications and medical history as documented below.  All changes duly noted  Allergies: Aspirin, Hydrocodone, and Nsaids   Current Medications[1]   Past Medical History[2]   Past Surgical History[3]   Family History[4]   Social Hx on file[5]        REVIEW OF SYSTEMS:   Today reviewed systens as documented below  GENERAL HEALTH: feels well otherwise  SKIN: Refer to exam below  RESPIRATORY: denies shortness of breath with exertion  CARDIOVASCULAR: denies chest pain on exertion  GI: denies abdominal pain and denies heartburn  NEURO: denies headaches    EXAM:   There were no vitals taken for this visit.  GENERAL: well developed, well nourished, in no apparent distress  EXTREMITIES:   1. Integument: The skin on her left foot is warm and dry there are no skin changes from the injection except a little ecchymosis   2. Vascular: Has palpable pulses   3. Neurologic: Has intact sensorium   4. Musculoskeletal: Good muscle strength.  No further pain on palpation of the 2nd or 3rd interspace area on the left foot.    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    The patient indicates understanding of these issues and agrees to the plan.    Donnell Kong DPM       [1]   Current Outpatient Medications   Medication Sig Dispense Refill    alendronate 10 MG Oral Tab  Take 1 tablet (10 mg total) by mouth every morning before breakfast. 90 tablet 3    amLODIPine 5 MG Oral Tab Take 1 tablet (5 mg total) by mouth daily. 90 tablet 3    amoxicillin clavulanate 875-125 MG Oral Tab Take 1 tablet by mouth 2 (two) times daily. 20 tablet 0    diazePAM 2 MG Oral Tab Take one tab (2 mg) 30 minutes prior to MRI for anxiety, may repeat once during MRI if needed. Do not drive after taking for remainder of day/get a ride home.). (Patient not taking: Reported on 3/18/2025) 2 tablet 0   [2]   Past Medical History:   Abdominal distention    Abdominal hernia    Abdominal pain    Also 2021    Allergic rhinitis    Arthritis    Fingers    Belching    Bloating    Body piercing    Constipation    Decorative tattoo    Easy bruising    Essential hypertension    Flatulence/gas pain/belching    Food intolerance    Milk, ice cream    Headache disorder    Several times per week    Heart palpitations    Occasionally    Hemorrhoids    High blood pressure    History of stomach ulcers    Irregular bowel habits    Nausea    Presence of other cardiac implants and grafts    [place] in right toe    Sleep disturbance    Uncomfortable fullness after meals    Visual impairment    glaases    Wears glasses    Glasses   [3]   Past Surgical History:  Procedure Laterality Date    Arthroscopy of joint unlisted Right     knee    Colonoscopy  2015    normal; repeat 10 yrs    Colonoscopy      Every 10 years    Colonoscopy,diagnostic N/A 2015    Procedure: COLONOSCOPY, POSSIBLE BIOPSY, POSSIBLE POLYPECTOMY 60015;  Surgeon: Kevin Vivas MD;  [facility]    D & c      [facility] localization wire 1 site left (cpt=19281)      [facility] 2024    Needle biopsy left  2023    Stromal fibrosis      Aug. 1972    Other surgical history      bilateral feet sx-local    Tubal ligation     [4]   Family History  Problem Relation Age of Onset    Cancer Mother         Breast cancer    Breast Cancer Mother  75        estimated age    Cancer Father         Lung cancer    Dementia Sister         Alzheimers    Diabetes Sister         Alzheimers    Diabetes Brother     Other (vagina or uterine cancer) Maternal Grandmother     Ear Problems Neg     Allergies Neg     Bleeding Disorders Neg     Clotting Disorder Neg     Thyroid disease Neg     Hypertension Neg     Asthma Neg     Arthritis Neg     Anesthesia Problems  Neg    [5]   Social History  Socioeconomic History    Marital status: Engaged   Tobacco Use    Smoking status: Never     Passive exposure: Past (both parents smoked in home when patient was a child)    Smokeless tobacco: Never    Tobacco comments:     Job:  Works at [employer]   Vaping Use    Vaping status: Never Used   Substance and Sexual Activity    Alcohol use: No    Drug use: Never   Other Topics Concern    Caffeine Concern Yes     Comment: Coffee    Exercise No    Seat Belt No    Special Diet No    Stress Concern No    Weight Concern No

## 2025-05-01 ENCOUNTER — OFFICE VISIT (OUTPATIENT)
Dept: SURGERY | Facility: CLINIC | Age: 75
End: 2025-05-01
Payer: MEDICARE

## 2025-05-01 VITALS
BODY MASS INDEX: 28 KG/M2 | HEART RATE: 72 BPM | RESPIRATION RATE: 18 BRPM | OXYGEN SATURATION: 96 % | WEIGHT: 141 LBS | SYSTOLIC BLOOD PRESSURE: 138 MMHG | DIASTOLIC BLOOD PRESSURE: 70 MMHG

## 2025-05-01 DIAGNOSIS — Z12.31 SCREENING MAMMOGRAM FOR BREAST CANCER: ICD-10-CM

## 2025-05-01 DIAGNOSIS — R92.30 DENSE BREAST TISSUE: ICD-10-CM

## 2025-05-01 DIAGNOSIS — D24.2 BENIGN NEOPLASM OF LEFT BREAST: ICD-10-CM

## 2025-05-01 DIAGNOSIS — N60.92 ATYPICAL LOBULAR HYPERPLASIA (ALH) OF LEFT BREAST: Primary | ICD-10-CM

## 2025-05-01 PROCEDURE — 99214 OFFICE O/P EST MOD 30 MIN: CPT

## 2025-05-06 ENCOUNTER — PATIENT MESSAGE (OUTPATIENT)
Facility: CLINIC | Age: 75
End: 2025-05-06

## 2025-05-09 ENCOUNTER — TELEPHONE (OUTPATIENT)
Facility: CLINIC | Age: 75
End: 2025-05-09

## 2025-05-09 DIAGNOSIS — G47.33 OSA (OBSTRUCTIVE SLEEP APNEA): Primary | ICD-10-CM

## 2025-05-12 ENCOUNTER — MED REC SCAN ONLY (OUTPATIENT)
Facility: CLINIC | Age: 75
End: 2025-05-12

## 2025-05-15 NOTE — PROGRESS NOTES
Breast Surgery surveillance visit    Diagnosis: Atypical lobular hyperplasia, left breast, status post excisional biopsy on 4/26/2024.    Stage: N/A    Disease Status:  Surgical treatment complete, high risk surveillance and chemoprevention counseling pending.    History of Present Illness:   Ms. Silvia Rodríguez is a 75 year old woman who presents with acute enlarging left breast mass.  The patient denies any palpable masses, nipple discharge, skin changes or axillary symptoms.  She does have a family history of breast cancer.  She has a remote history of a benign biopsy of the left breast.  Her most recent bilateral diagnostic evaluation in August 2023 showed an increase in size of a mass in the left breast at 3:00, 6 cm from the nipple for which biopsy was recommended.  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  She was recommended for short-term surveillance ultrasound which took place on December 6, 2023 and confirmed interval enlargement of the lesion now up to 2.4 cm for which consideration of surgical excision was recommended. She underwent excisional biopsy, which occurred without complication.  Most recent imaging was a bilateral mammogram in April 2025 which showed heterogeneously dense breast tissue with postsurgical changes in the left breast.  She is here today for evaluation and recommendations for further therapy.        Past Medical History:    Abdominal distention    Abdominal hernia    Abdominal pain    Also 8/27/2021    Allergic rhinitis    Arthritis    Fingers    Belching    Bloating    Body piercing    Constipation    Decorative tattoo    Easy bruising    Essential hypertension    Flatulence/gas pain/belching    Food intolerance    Milk, ice cream    Headache disorder    Several times per week    Heart palpitations    Occasionally    Hemorrhoids    High blood pressure    History of stomach ulcers    Irregular bowel habits    Nausea    Presence of other  cardiac implants and grafts    [place] in right toe    Sleep disturbance    Uncomfortable fullness after meals    Visual impairment    glaases    Wears glasses    Glasses       Past Surgical History:   Procedure Laterality Date    Arthroscopy of joint unlisted Right     knee    Colonoscopy  2015    normal; repeat 10 yrs    Colonoscopy      Every 10 years    Colonoscopy,diagnostic N/A 2015    Procedure: COLONOSCOPY, POSSIBLE BIOPSY, POSSIBLE POLYPECTOMY 03049;  Surgeon: Kevin Vivas MD;  [facility]    D & c  [address] [place] [address] localization wire 1 site left (cpt=19281)      [facility] 2024    Needle biopsy left  2023    Stromal fibrosis      Aug. 1972    Other surgical history      bilateral feet sx-local    Tubal ligation         Gynecological History:  Pt is a   Pt was 22 years old at time of first pregnancy.    She denies any cumulative breastfeeding history  She achieved menarche at age 15 and LMP age 50  Age of Menopause: 50  Type: natural menopause  She has history of hormone replacement therapy for 2 years, last in  .  She denies any history of oral contraceptive use   She denies infertility treatment to achieve pregnancy.    Medications:     alendronate 10 MG Oral Tab Take 1 tablet (10 mg total) by mouth every morning before breakfast. 90 tablet 3    amLODIPine 5 MG Oral Tab Take 1 tablet (5 mg total) by mouth daily. 90 tablet 3       Allergies:    Allergies   Allergen Reactions    Aspirin HIVES    Hydrocodone UNKNOWN    Nsaids HIVES       Family History:   Family History   Problem Relation Age of Onset    Cancer Mother         Breast cancer    Breast Cancer Mother 75        estimated age    Cancer Father         Lung cancer    Dementia Sister         Alzheimers    Diabetes Sister         Alzheimers    Diabetes Brother     Other (vagina or uterine cancer) Maternal Grandmother     Ear Problems Neg     Allergies Neg     Bleeding Disorders Neg     Clotting Disorder Neg      Thyroid disease Neg     Hypertension Neg     Asthma Neg     Arthritis Neg     Anesthesia Problems  Neg        She is not of Ashkenazi Mormonism ancestry.    Social History:  History   Alcohol Use No       History   Smoking Status    Never   Smokeless Tobacco    Never     Ms. Silvia Rodríguez is  with 1 children. She has 3 siblings. She is currently Homemaker    Review of Systems:  General:   The patient denies, fever, chills, night sweats, fatigue, generalized weakness, change in appetite or weight loss.    HEENT:     The patient denies eye irritation, cataracts, redness, glaucoma, yellowing of the eyes, change in vision, color blindness, or +wearing contacts/glasses. The patient denies hearing loss, ringing in the ears, ear drainage, earaches, nasal congestion, nose bleeds, snoring, pain in mouth/throat, hoarseness, change in voice, facial trauma.    Respiratory:  The patient denies chronic cough, phlegm, hemoptysis, pleurisy/chest pain, pneumonia, asthma, wheezing, difficulty in breathing with exertion, emphysema, chronic bronchitis, shortness of breath or abnormal sound when breathing.     Cardiovascular:  There is no history of chest pain, chest pressure/discomfort, palpitations, irregular heartbeat, fainting or near-fainting, difficulty breathing when lying flat, SOB/Coughing at night, swelling of the legs or chest pain while walking.    Breasts:  See history of present illness    Gastrointestinal:     There is no history of difficulty or pain with swallowing, +reflux symptoms, vomiting, dark or bloody stools, constipation, yellowing of the skin, indigestion, nausea, change in bowel habits, diarrhea, abdominal pain or vomiting blood.     Genitourinary:  The patient denies frequent urination, +needing to get up at night to urinate, urinary hesitancy or retaining urine, painful urination, urinary incontinence, decreased urine stream, blood in the urine or vaginal/penile discharge.    Skin:    The patient denies  rash, itching, skin lesions, dry skin, change in skin color or change in moles.     Hematologic/Lymphatic:  The patient denies +easily bruising or bleeding or persistent swollen glands or lymph nodes.     Musculoskeletal:  The patient denies muscle aches/pain, joint pain, stiff joints, neck pain, back pain or bone pain.    Neuropsychiatric:  There is no history of migraines or severe headaches, seizure/epilepsy, speech problems, coordination problems, trembling/tremors, fainting/black outs, dizziness, memory problems, loss of sensation/numbness, problems walking, weakness, tingling or burning in hands/feet. There is no history of abusive relationship, bipolar disorder, sleep disturbance, anxiety, depression or feeling of despair.    Endocrine:    There is no history of poor/slow wound healing, weight loss/gain, fertility or hormone problems, cold intolerance, thyroid disease.     Allergic/Immunologic:  There is no history of hives, hay fever, angioedema or anaphylaxis.    /70 (BP Location: Right arm, Patient Position: Sitting, Cuff Size: adult)   Pulse 72   Resp 18   Wt 64 kg (141 lb)   SpO2 96%   BMI 27.54 kg/m²     Physical Exam:  The patient is an alert, oriented, well-nourished and  well-developed woman who appears her stated age. Her speech patterns and movements are normal. Her affect is appropriate.    HEENT: The head is normocephalic. The neck is supple. The thyroid is not enlarged and is without palpable masses/nodules. There are no palpable masses. The trachea is in the midline. Conjunctiva are clear, non-icteric.    Chest: The chest expands symmetrically. The lungs are clear to auscultation.    Heart: The rhythm is regular.  There are no murmurs, rubs, gallops or thrills.    Breasts:  Her breasts are symmetrical with a cup size 34DDD.  Right breast: The skin, nipple ,and areola appear normal. There is no skin dimpling with movement of the pectoralis. There is no nipple retraction. No nipple  discharge can be elicited. The parenchyma is mildly nodular. There are no dominant masses in the breast. The axillary tail is normal.  Left breast:   The skin, nipple, and areola appear normal. There is no skin dimpling with movement of the pectoralis. There is no nipple retraction. No nipple discharge can be elicited. The parenchyma is mildly nodular. There are no dominant masses in the breast. The axillary tail is normal.  There is a well-healed incision with no signs of infection.    Abdomen:  The abdomen is soft, flat and non tender. The liver is not enlarged. There are no palpable masses.    Lymph Nodes:  The supraclavicular, axillary and cervical regions are free of significant lymphadenopathy.    Back: There is no vertebral column tenderness.    Skin: The skin appears normal. There are no suspicious appearing rashes or lesions.    Extremities: The extremities are without deformity, cyanosis or edema.    Impression:   Ms. Silvia Rodríguez is a 75 year old woman presents with family history of breast cancer and biopsy confirmed benign mass in the left breast with interval enlargement on recent imaging, s/p excision.     Recommendations:   I had a discussion with the Patient regarding her breast exam.  On exam today I found no evidence of malignancy bilaterally.  She has healed well from surgery.  Surgical pathology confirmed atypical lobular hyperplasia with no additional findings.  We discussed the significance invocation including effect on future breast cancer risk.      Discuss the role of chemoprevention.      Discussed the following data:  --Tamoxifen 20 mg a day for 5 yrs shown to reduce the risk of breast cancer by 49% and among women with h/o atypical hyperplasia, same dose and duration was associated with a risk reduction of 86% reduction in breast cancer risk.  FDA approved the use of Tamoxifen for breast cancer risk reduction in premenopausal women at increased risk for breast cancer based upon the results  of the NSABP Breast Cancer Prevention Trial in 1998. The criteria for inclusion included a 5 year risk of breast cancer greater than 1.7% based on the Rosa Maria Model which in order to establish a favorable risk versus benefit profile.   --Raloxifine at 60 mg for post-menopausal women was found to be equivalent to tamoxifen for breast cancer risk reduction in the initial comparison, in the long-term f/u it appears to be less efficacious in risk reduction than tamoxifen.  Consideration of toxicity may still lead to the choice of raloxifine over tamoxifen in women with intact uterus.     --Exemestane for post-menopausal women in a large single randomizes study in women with LCIS was found to reduce the relative incidence of invasive breast cancer by 65% at 3 yr median f/u.  --Anastrozole for post-menopausal women was found to reduce the relative incidence of breast cancer by 53% at a 5 yr median f/u.     We will plan for a screening mammogram in April 2026.  Due to patient's history of atypia as well as dense breast tissue, I am recommending yearly breast MRIs in addition to her yearly mammograms.  She will be due for this in October 2025.  Presently she is not inclined to start any chemoprevention for concern of side effects.  She should follow-up in 1 year for a clinical exam.  She was given ample opportunity for questions and those questions were answered to her satisfaction. She was encouraged to contact the office with any questions or concerns prior to her next scheduled appointment.

## 2025-05-20 ENCOUNTER — PATIENT MESSAGE (OUTPATIENT)
Dept: PHYSICAL THERAPY | Age: 75
End: 2025-05-20

## 2025-05-20 ENCOUNTER — TELEPHONE (OUTPATIENT)
Dept: SLEEP CENTER | Age: 75
End: 2025-05-20

## 2025-05-20 DIAGNOSIS — G47.00 INSOMNIA, UNSPECIFIED TYPE: Primary | ICD-10-CM

## 2025-05-21 RX ORDER — ZOLPIDEM TARTRATE 5 MG/1
5 TABLET ORAL ONCE
Qty: 1 TABLET | Refills: 0 | Status: SHIPPED | OUTPATIENT
Start: 2025-05-21 | End: 2025-05-21

## 2025-05-22 ENCOUNTER — OFFICE VISIT (OUTPATIENT)
Dept: SLEEP CENTER | Age: 75
End: 2025-05-22
Attending: Other
Payer: MEDICARE

## 2025-05-22 DIAGNOSIS — G47.33 OSA (OBSTRUCTIVE SLEEP APNEA): ICD-10-CM

## 2025-05-22 PROCEDURE — 95810 POLYSOM 6/> YRS 4/> PARAM: CPT

## 2025-05-28 ENCOUNTER — PATIENT MESSAGE (OUTPATIENT)
Facility: CLINIC | Age: 75
End: 2025-05-28

## 2025-05-29 ENCOUNTER — TELEPHONE (OUTPATIENT)
Facility: CLINIC | Age: 75
End: 2025-05-29

## 2025-06-05 ENCOUNTER — TELEPHONE (OUTPATIENT)
Facility: CLINIC | Age: 75
End: 2025-06-05

## 2025-06-05 NOTE — TELEPHONE ENCOUNTER
Phone call to patient  Sleep study reviewed  BRY had been 14 on overnight  AHI now 6.0 RAAM to 9 alexsandra transiently 81% less than 0.1% of the night was low    Discussed at length plans to follow clinically with no recommended treatment at this time  Pt agrees

## 2025-06-09 ENCOUNTER — SLEEP STUDY (OUTPATIENT)
Facility: CLINIC | Age: 75
End: 2025-06-09
Payer: MEDICARE

## 2025-06-09 DIAGNOSIS — G47.33 OBSTRUCTIVE SLEEP APNEA SYNDROME: Primary | ICD-10-CM

## 2025-06-09 PROCEDURE — 95810 POLYSOM 6/> YRS 4/> PARAM: CPT | Performed by: OTHER

## 2025-08-15 ENCOUNTER — TELEPHONE (OUTPATIENT)
Age: 75
End: 2025-08-15

## 2025-08-15 DIAGNOSIS — E55.9 VITAMIN D DEFICIENCY: ICD-10-CM

## 2025-08-15 DIAGNOSIS — Z13.29 SCREENING FOR THYROID DISORDER: Primary | ICD-10-CM

## 2025-08-15 DIAGNOSIS — Z13.1 ENCOUNTER FOR SCREENING FOR DIABETES MELLITUS: ICD-10-CM

## 2025-08-15 DIAGNOSIS — E78.5 HYPERLIPIDEMIA, UNSPECIFIED HYPERLIPIDEMIA TYPE: ICD-10-CM

## 2025-08-15 DIAGNOSIS — I10 ESSENTIAL HYPERTENSION: ICD-10-CM

## 2025-08-15 DIAGNOSIS — Z13.0 ENCOUNTER FOR SCREENING FOR DISEASES OF THE BLOOD AND BLOOD-FORMING ORGANS AND CERTAIN DISORDERS INVOLVING THE IMMUNE MECHANISM: ICD-10-CM

## 2025-08-15 DIAGNOSIS — Z00.00 ENCOUNTER FOR ANNUAL HEALTH EXAMINATION: ICD-10-CM

## (undated) DEVICE — GOWN,SIRUS,FABRIC-REINFORCED,LARGE: Brand: MEDLINE

## (undated) DEVICE — CLIP SUR SM TI HRT SHP WIRE HORZ LIG SYS

## (undated) DEVICE — SUT PERMA- 2-0 18IN FS NABSRB BLK 26MM 3/8

## (undated) DEVICE — 3M™ STERI-DRAPE™ INSTRUMENT POUCH 1018: Brand: STERI-DRAPE™

## (undated) DEVICE — ELECTRODE ES 2.75IN PTFE BLDE MOD E-Z CLN

## (undated) DEVICE — COVER LT HNDL RIG FOR SUR CAM DISP

## (undated) DEVICE — SOLUTION IRRIG 1000ML 0.9% NACL USP BTL

## (undated) DEVICE — UNDERPAD INCONT 23X36IN STD BLU BACKSHEET

## (undated) DEVICE — ANTIBACTERIAL UNDYED BRAIDED (POLYGLACTIN 910), SYNTHETIC ABSORBABLE SUTURE: Brand: COATED VICRYL

## (undated) DEVICE — BREAST-HERNIA-PORT CDS-LF: Brand: MEDLINE INDUSTRIES, INC.

## (undated) DEVICE — DRAPE,TAPE STRIPS,STERILE: Brand: MEDLINE

## (undated) DEVICE — DRAPE PACK CHEST

## (undated) DEVICE — SLEEVE COMPR MD KNEE LEN SGL USE KENDALL SCD

## (undated) DEVICE — GLOVE SUR 6.5 SENSICARE PI PIP CRM PWD F

## (undated) DEVICE — SUT MCRYL 4-0 18IN PS-2 ABSRB UD 19MM 3/8 CIR

## (undated) DEVICE — CLIP LIG M BLU TI HRT SHP WIRE HORZ

## (undated) DEVICE — PAD,ABDOMINAL,8"X7.5",ST,LF,20/BX: Brand: MEDLINE INDUSTRIES, INC.

## (undated) DEVICE — Device

## (undated) NOTE — LETTER
10/10/23    Patient: Joselo Coello  : 1950 Visit date: 10/9/2023    Dear  Vinnie Leyva MD    Thank you for referring Joselo Coello to my practice. Please find my assessment and plan below. Assessment   Anorectal skin tags  (primary encounter diagnosis)    This is a very nice 71-year-old female who was referred to me for further evaluation of an abnormal PET scan. Patient underwent a CT/PET scan on 2023 ordered by her pulmonologist, Dr. Myriam Burger, to further evaluate a pulmonary nodule. This showed moderate to marked focal activity associated with the anal sphincter complex. Patient then followed up with her gastroenterologist, Dr. Rich Reyes, and underwent a colonoscopy on 2023. This revealed two 1 cm polyps, diverticulosis, grade 2 internal hemorrhoids and prominent polypoid external anal skin tags. Dr. Sushma Trujillo notes that the anal canal was extensively examined given positive PET with no overt lesions seen. Dr. Sushma Trujillo recommended follow-up with colorectal surgery to evaluate the anal skin tags. Patient has noted anal skin tags for the last 50 years. They do not cause her any symptoms. Specifically, she denies any pain, bleeding, prolapse, seepage, itching or incontinence. They do not interfere with hygiene. No prior anorectal surgery. She recalls having an abnormal Pap smear around the year . She denies any history of genital warts or sexually transmitted infection. External exam of the anus does reveal prominent anal skin tags in the anterior midline, right posterior and left posterior positions. The skin tags are all soft, mobile and fleshy appearing. There is no firmness, metaplasia or polypoid tissue seen. Digital rectal exam reveals fair tone without any masses, bleeding or drainage. Anoscopy reveals small internal hemorrhoids with normal anorectal mucosa and no abnormal lesions.     Overall, there are no abnormal findings appreciated on bedside exam with anoscopy to explain the abnormal PET/CT findings. The anal skin tags are completely asymptomatic. Plan  I recommend ongoing watchful observation and serial exams at this point. I would like to see her again in 6 months time for re-evaluation with repeat exam and anoscopy. She should follow-up with me sooner should she develop any anorectal symptoms. The risks of surgical skin tag excision outweigh any benefits at this point as they are completely asymptomatic and have a completely benign clinical appearance. Patient expressed understanding and was agreeable to plan.       Sincerely,       Elina Ceron MD   CC:   No Recipients

## (undated) NOTE — LETTER
AUTHORIZATION FOR SURGICAL OPERATION OR OTHER PROCEDURE    1. I hereby authorize Dr. Donnell Kong, and [facility] staff assigned to my case to perform the following operation and/or procedure at the [facility] site:    _______________________________________________________________________________________________    Cortisone Injection Left Foot  _______________________________________________________________________________________________    2.  My physician has explained the nature and purpose of the operation or other procedure, possible alternative methods of treatment, the risks involved, and the possibility of complication to me.  I acknowledge that no guarantee has been made as to the result that may be obtained.  3.  I recognize that, during the course of this operation, or other procedure, unforseen conditions may necessitate additional or different procedure than those listed above.  I, therefore, further authorize and request that the above named physician, his/her physician assistants or designees perform such procedures as are, in his/her professional opinion, necessary and desirable.  4.  Any tissue or organs removed in the operation or other procedure may be disposed of by and at the discretion of the [facility] and [facility].  5.  I understand that in the event of a medical emergency, I will be transported by local paramedics to [facility] or other hospital emergency department.  6.  I certify that I have read and fully understand the above consent to operation and/or other procedure.    7.  I acknowledge that my physician has explained sedation/analgesia administration to me including the risks and benefits.  I consent to the administration of sedation/analgesia as may be necessary or desirable in the judgement of my physician.    Witness signature: ___________________________________________________ Date:   ______/______/_____                    Time:  ________ A.M.  P.M.       Patient Name:  ______________________________________________________  (please print)      Patient signature:  ___________________________________________________             Relationship to Patient:           []  Parent    Responsible person                          []  Spouse  In case of minor or                    [] Other  _____________   Incompetent name:  __________________________________________________                               (please print)      _____________      Responsible person  In case of minor or  Incompetent signature:  _______________________________________________    Statement of Physician  My signature below affirms that prior to the time of the procedure, I have explained to the patient and/or his/her guardian, the risks and benefits involved in the proposed treatment and any reasonable alternative to the proposed treatment.  I have also explained the risks and benefits involved in the refusal of the proposed treatment and have answered the patient's questions.                        Date:  ______/______/_______  Provider                      Signature:  __________________________________________________________       Time:  ___________ A.M    P.M.

## (undated) NOTE — MR AVS SNAPSHOT
[facility] [address] [address] [phone]               Thank you for choosing us for your health care visit with Alex Moulton DO.   We are glad to serve you and happy to provide you with this summa Cervical cancer Only women who had abnormal screening results before age 72 Talk with your healthcare provider   Chlamydia Women at increased risk for infection At routine exams   Colorectal cancer All women in this age group1 Flexible sigmoidoscopy every this infection or vaccine 2 doses; second dose should be given at least 4 weeks after the first dose   Hepatitis A Women at increased risk for infection – talk with your healthcare provider 2 doses given 6 months apart   Hepatitis B Women at increased risk professional's instructions.              Allergies as of Jun 06, 2017     Aspirin Hives    Norco [Hydrocodone-Acetaminophen]     Nsaids Hives                Today's Vital Signs     BP Pulse Temp Height Weight BMI    122/76 mmHg 65 98.2 °F (36.8 °C) (Oral) are inactive.      HOW TO GET STARTED: HOW TO STAY MOTIVATED:   Start activities slowly and build up over time Do what you like   Get your heart pumping – brisk walking, biking, swimming Even 10 minute increments are effective and add up over the week   2 ½

## (undated) NOTE — LETTER
To:  Dr Jsohi  Date:  3/27/2024  Patient Name: Silvia Rodríguez-Age / Sex: 1950-A: 74 y  female  Medical Records: [id number]   CSN: [id number]      Clearance for Surgery Requested by Surgeon    Request for:  Medical Clearance    Requested by Surgeon: Dr Joshi    Surgical Date: 2024    Procedure: Left breast wire localized excisional biopsy, Left      Please fax the clearance note to the Pre-Admission Testing department.  Thank you.

## (undated) NOTE — LETTER
OUTSIDE TESTING RESULT REQUEST     IMPORTANT: FOR YOUR IMMEDIATE ATTENTION  Please FAX all test results listed below to: [phone]     Testing already done on or about: asap     * * * * If testing is NOT complete, arrange with patient A.S.A.P. * * * *      Patient Name: Silvia Rodríguez  Surgery Date: 2024  Medical Record: [id number]  CSN: [id number]  : 1950 - A: 74 y     Sex: female  Surgeon(s):  Sylvia Joshi MD  Procedure: Left breast wire localized excisional biopsy  Anesthesia Type: MAC     Surgeon: Sylvia Joshi MD     The following Testing and Time Line are REQUIRED PER ANESTHESIA     EKG READ AND SIGNED WITHIN   90 days  BMP (requires 4 hour fast) within  90 days      Thank You,   Sent by:DEBORAH

## (undated) NOTE — Clinical Note
Date: 6/13/2017    Patient Name: Kalyan Cade          To Whom it may concern: This letter has been written at the patient's request. The above patient was seen at the [facility] for treatment of a medical condition.     This patient should be

## (undated) NOTE — ED AVS SNAPSHOT
Ricardo Rubio   MRN: [id number]    Department:  [facility] Emergency Department   Date of Visit:  3/10/2018           Disclosure     Insurance plans vary and the physician(s) referred by the ER may not be covered by your plan.  Please contact your insur tell this physician (or your personal doctor if your instructions are to return to your personal doctor) about any new or lasting problems. The primary care or specialist physician will see patients referred from the [facility] Emergency Department.  Trina Louise

## (undated) NOTE — MR AVS SNAPSHOT
[facility] [address] [phone]               Thank you for choosing us for your health care visit with Glenn Mcclani DO.   We are glad to serve you and happy to provide you with this summa ice pack by filling a plastic bag that seals at the top with ice cubes and then wrapping it with a thin towel. Continue to use ice packs for relief of pain and swelling as needed.  As the ice melts, be careful to avoid getting your wrap, splint, or cast wet · The splint or knee immobilizer brace becomes wet or soft  · The fiberglass cast or splint remains wet for more than 24 hours  · Pain or swelling increases  · The injured leg or toes become cold, blue, numb, or tingly  Date Last Reviewed: 11/20/2015 © 20

## (undated) NOTE — Clinical Note
Date: 6/13/2017    Patient Name: Sohail Robertson          To Whom it may concern: This letter has been written at the patient's request. The above patient was seen at the [facility] for treatment of a medical condition.     This patient should be

## (undated) NOTE — LETTER
24    Patient: Silvia Rodríguez  : 1950 Visit date: 2024    Dear  Abena Ayala DO    Thank you for referring Silvia Rodríguez to my practice.  Please find my assessment and plan below.    Assessment   1. Rectal mass    2. Abnormal findings on diagnostic imaging of digestive system      This is a very nice 74-year-old female who was referred to me for further evaluation of an abnormal PET scan.  Patient underwent a CT/PET scan on 2023 ordered by her pulmonologist, Dr. Perez, to further evaluate a pulmonary nodule.  This showed moderate to marked focal activity associated with the anal sphincter complex.  Patient then followed up with her gastroenterologist, Dr. Khoa Winkler, and underwent a colonoscopy on 2023.  This revealed two 1 cm polyps, diverticulosis, grade 2 internal hemorrhoids and prominent polypoid external anal skin tags.  Dr. Winkler notes that the anal canal was extensively examined given positive PET with no overt lesions seen.  Dr. Winkler recommended follow-up with colorectal surgery to evaluate the anal skin tags.     Patient has noted anal skin tags for the last 50 years.  They do not cause her any symptoms.  Specifically, she denies any pain, bleeding, prolapse, seepage, itching or incontinence.  They do not interfere with hygiene.  No prior anorectal surgery.  She recalls having an abnormal Pap smear around the year .  She denies any history of genital warts or sexually transmitted infection.    Patient returns for 6-month follow-up today.  She has no anorectal symptoms.     External exam of the anus does reveal prominent anal skin tags in the anterior midline, right posterior and left posterior positions.  The skin tags are all soft, mobile and fleshy appearing.  There is no firmness, metaplasia or polypoid tissue seen.  Digital rectal exam reveals fair tone without any masses, bleeding or drainage.  Anoscopy reveals grade 2-3 right anterior and right posterior internal  hemorrhoids.  The anorectal mucosa appears pink and healthy without any masses or lesions.  Examination was somewhat limited by stool.    Overall, there are no abnormal findings appreciated on bedside exam with anoscopy to explain the abnormal PET/CT findings.  The anal skin tags and hemorrhoids are completely asymptomatic.     Plan   I recommend MRI pelvis rectal cancer protocol to rule out any occult masses within the anal canal or sphincter complex.  If MRI pelvis is negative, patient can follow-up with me as needed.  If MRI has any questionable lesions, will need to have further discussion for anal exam under anesthesia with anoscopy and possible biopsies.    I recommend ongoing follow-up with Dr. Winkler for surveillance colonoscopies.  I do not recommend planning for excision of the anal skin tags or hemorrhoidectomy at this point as they are completely asymptomatic and have a completely benign clinical appearance.  Patient expressed understanding and was agreeable to plan.       Sincerely,       Aryan Rosales MD   CC:   No Recipients